# Patient Record
Sex: FEMALE | Race: WHITE | NOT HISPANIC OR LATINO | Employment: FULL TIME | URBAN - NONMETROPOLITAN AREA
[De-identification: names, ages, dates, MRNs, and addresses within clinical notes are randomized per-mention and may not be internally consistent; named-entity substitution may affect disease eponyms.]

---

## 2017-01-31 ENCOUNTER — TELEPHONE (OUTPATIENT)
Dept: FAMILY MEDICINE | Facility: OTHER | Age: 42
End: 2017-01-31

## 2017-01-31 DIAGNOSIS — E03.9 HYPOTHYROIDISM: Primary | ICD-10-CM

## 2017-01-31 NOTE — TELEPHONE ENCOUNTER
Last night started feeling ill, went to bed early but woke up and felt sudden rush of nausea and abdominal pain that almost caused her to pass out, this occurred 3 times during the night. Also has body aches, low grade temp, sore throat and a lot of sinus pressure. But more concerned about syncope symptoms. Do you want her to come in to be examined or think more flu like symptoms?

## 2017-02-01 RX ORDER — THYROID 30 MG/1
TABLET ORAL
Qty: 90 TABLET | Refills: 0 | Status: SHIPPED | OUTPATIENT
Start: 2017-02-01 | End: 2017-11-03

## 2017-02-22 ENCOUNTER — APPOINTMENT (OUTPATIENT)
Dept: GENERAL RADIOLOGY | Facility: OTHER | Age: 42
End: 2017-02-22
Attending: FAMILY MEDICINE
Payer: COMMERCIAL

## 2017-02-22 ENCOUNTER — OFFICE VISIT (OUTPATIENT)
Dept: FAMILY MEDICINE | Facility: OTHER | Age: 42
End: 2017-02-22
Attending: FAMILY MEDICINE
Payer: COMMERCIAL

## 2017-02-22 VITALS
BODY MASS INDEX: 24.16 KG/M2 | HEIGHT: 65 IN | TEMPERATURE: 98.5 F | WEIGHT: 145 LBS | OXYGEN SATURATION: 98 % | DIASTOLIC BLOOD PRESSURE: 70 MMHG | SYSTOLIC BLOOD PRESSURE: 104 MMHG | HEART RATE: 74 BPM

## 2017-02-22 DIAGNOSIS — R07.89 CHEST WALL PAIN: Primary | ICD-10-CM

## 2017-02-22 DIAGNOSIS — J18.9 PNEUMONIA OF RIGHT LOWER LOBE DUE TO INFECTIOUS ORGANISM: ICD-10-CM

## 2017-02-22 PROCEDURE — 99213 OFFICE O/P EST LOW 20 MIN: CPT | Performed by: FAMILY MEDICINE

## 2017-02-22 PROCEDURE — 71020 ZZHC CHEST TWO VIEWS, FRONT/LAT: CPT | Mod: TC | Performed by: RADIOLOGY

## 2017-02-22 RX ORDER — AZITHROMYCIN 250 MG/1
TABLET, FILM COATED ORAL
Qty: 6 TABLET | Refills: 0 | Status: SHIPPED | OUTPATIENT
Start: 2017-02-22 | End: 2017-03-29

## 2017-02-22 ASSESSMENT — PAIN SCALES - GENERAL: PAINLEVEL: MODERATE PAIN (4)

## 2017-02-22 NOTE — MR AVS SNAPSHOT
"              After Visit Summary   2017    Deanne Yee    MRN: [id number]           Patient Information     Date Of Birth          1975        Visit Information        Provider Department      2017 10:45 AM Astrid Romero MD [facility]        Today's Diagnoses     Chest wall pain    -  1    Pneumonia of right lower lobe due to infectious organism           Follow-ups after your visit        Who to contact     If you have questions or need follow up information about today's clinic visit or your schedule please [facility] directly at [phone].  Normal or non-critical lab and imaging results will be communicated to you by [employer], letter or phone within 4 business days after the clinic has received the results. If you do not hear from us within 7 days, please contact the clinic through [employer] or phone. If you have a critical or abnormal lab result, we will notify you by phone as soon as possible.  Submit refill requests through [employer] or call your pharmacy and they will forward the refill request to us. Please allow 3 business days for your refill to be completed.          Additional Information About Your Visit        MyChart Information     [employer] lets you send messages to your doctor, view your test results, renew your prescriptions, schedule appointments and more. To sign up, go to [email or web address] . Click on \"Log in\" on the left side of the screen, which will take you to the Welcome page. Then click on \"Sign up Now\" on the right side of the page.     You will be asked to enter the access code listed below, as well as some personal information. Please follow the directions to create your username and password.     Your access code is: [email or web address]  Expires: 2017  3:28 PM     Your access code will  in 90 days. If you need help or a new code, please call your [facility] or [phone].        Care EveryWhere ID     This is " "your Care EveryWhere ID. This could be used by other organizations to access your [place] medical records  [id number]        Your Vitals Were     Pulse Temperature Height Pulse Oximetry BMI (Body Mass Index)       74 98.5  F (36.9  C) (Tympanic) 5' 5\" (1.651 m) 98% 24.13 kg/m2        Blood Pressure from Last 3 Encounters:   02/22/17 104/70   08/09/16 116/70   06/15/16 122/72    Weight from Last 3 Encounters:   02/22/17 145 lb (65.8 kg)   08/09/16 159 lb (72.1 kg)   06/15/16 160 lb (72.6 kg)              We Performed the Following     XR CHEST 2 VW (Clinic Performed)          Today's Medication Changes          These changes are accurate as of: 2/22/17  3:28 PM.  If you have any questions, ask your nurse or doctor.               Start taking these medicines.        Dose/Directions    azithromycin 250 MG tablet   Commonly known as:  ZITHROMAX   Used for:  Pneumonia of right lower lobe due to infectious organism   Started by:  Astrid Romero MD        Two tablets first day, then one tablet daily for four days.   Quantity:  6 tablet   Refills:  0         Stop taking these medicines if you haven't already. Please contact your care team if you have questions.     amitriptyline 10 MG tablet   Commonly known as:  ELAVIL   Stopped by:  Astrid Romero MD           hydrOXYzine 25 MG capsule   Commonly known as:  VISTARIL   Stopped by:  Astrid Romero MD           nortriptyline 10 MG capsule   Commonly known as:  PAMELOR   Stopped by:  Astrid Romero MD           SUMAtriptan 25 MG tablet   Commonly known as:  IMITREX   Stopped by:  Astrid Romero MD                Where to get your medicines      These medications were sent to [id number] [place] - [address] [place] [facility] [place] [address], [place] [address]     Phone:  [phone]     azithromycin 250 MG tablet                Primary Care Provider Office Phone # Fax #    Arnav Lawrence [phone] " [phone]       [facility] [address] [place] [address]        Thank you!     Thank you for choosing [facility]  for your care. Our goal is always to provide you with excellent care. Hearing back from our patients is one way we can continue to improve our services. Please take a few minutes to complete the written survey that you may receive in the mail after your visit with us. Thank you!             Your Updated Medication List - Protect others around you: Learn how to safely use, store and throw away your medicines at www.disposemymeds.org.          This list is accurate as of: 2/22/17  3:28 PM.  Always use your most recent med list.                   Brand Name Dispense Instructions for use    ARMOUR THYROID 30 MG tablet   Generic drug:  thyroid     90 tablet    TAKE 1 TABLET BY MOUTH EVERY DAY       azithromycin 250 MG tablet    ZITHROMAX    6 tablet    Two tablets first day, then one tablet daily for four days.       CHILDRENS MULTIVITAMIN PO      Take 2 tablets by mouth daily       cholecalciferol 50755 UNITS capsule    VITAMIN D3    8 capsule    Take 1 capsule (50,000 Units) by mouth once a week       * sertraline 50 MG tablet    ZOLOFT    135 tablet    Take 1.5 tablets (75 mg) by mouth daily       * sertraline 25 MG tablet    ZOLOFT    90 tablet    TAKE 1 TABLET BY MOUTH EVERY DAY ALONG WITH THE 50MG TABLET       * Notice:  This list has 2 medication(s) that are the same as other medications prescribed for you. Read the directions carefully, and ask your doctor or other care provider to review them with you.

## 2017-02-22 NOTE — PROGRESS NOTES
"  SUBJECTIVE:                                                    Deanne Yee is a 41 year old female who presents to clinic today for the following health issues:        RESPIRATORY SYMPTOMS      Duration: Jan 30    Description  cough, fatigue/malaise and right rib pain    Severity: mild    Accompanying signs and symptoms: None    History (predisposing factors):  none    Precipitating or alleviating factors: None    Therapies tried and outcome:  none       Problem list and histories reviewed & adjusted, as indicated.  Additional history: as documented    Current Outpatient Prescriptions   Medication Sig Dispense Refill     azithromycin (ZITHROMAX) 250 MG tablet Two tablets first day, then one tablet daily for four days. 6 tablet 0     ARMOUR THYROID 30 MG tablet TAKE 1 TABLET BY MOUTH EVERY DAY 90 tablet 0     sertraline (ZOLOFT) 25 MG tablet TAKE 1 TABLET BY MOUTH EVERY DAY ALONG WITH THE 50MG TABLET 90 tablet 0     cholecalciferol (VITAMIN D3) 77698 UNITS capsule Take 1 capsule (50,000 Units) by mouth once a week 8 capsule 0     sertraline (ZOLOFT) 50 MG tablet Take 1.5 tablets (75 mg) by mouth daily 135 tablet 1     Pediatric Multivit-Minerals-C (CHILDRENS MULTIVITAMIN PO) Take 2 tablets by mouth daily       Problem list, Medication list, Allergies, and Medical/Social/Surgical histories reviewed in EPIC and updated as appropriate.    ROS:  Constitutional, HEENT, cardiovascular, pulmonary, gi and gu systems are negative, except as otherwise noted.    OBJECTIVE:                                                    /70 (Cuff Size: Adult Regular)  Pulse 74  Temp 98.5  F (36.9  C) (Tympanic)  Ht 5' 5\" (1.651 m)  Wt 145 lb (65.8 kg)  SpO2 98%  BMI 24.13 kg/m2  Body mass index is 24.13 kg/(m^2).  GENERAL APPEARANCE: healthy, alert, no distress and fatigued  HENT: ear canals and TM's normal and nose and mouth without ulcers or lesions  NECK: no adenopathy, no asymmetry, masses, or scars and thyroid normal to " palpation  RESP: rhonchi R lower posterior and E to A changes heard RML  CV: regular rates and rhythm, normal S1 S2, no S3 or S4 and no murmur, click or rub  PSYCH: mentation appears normal and affect normal/bright       ASSESSMENT/PLAN:                                                    1. Chest wall pain    - XR CHEST 2 VW (Clinic Performed)    2. Pneumonia of right lower lobe due to infectious organism  F/u if not improving, get plenty of rest  - azithromycin (ZITHROMAX) 250 MG tablet; Two tablets first day, then one tablet daily for four days.  Dispense: 6 tablet; Refill: 0    Patient was agreeable to this plan and had no further questions.  See Patient Instructions    Astrid Romero MD  [facility]

## 2017-02-22 NOTE — NURSING NOTE
"Chief Complaint   Patient presents with     URI       Initial /70 (Cuff Size: Adult Regular)  Pulse 74  Temp 98.5  F (36.9  C) (Tympanic)  Ht 5' 5\" (1.651 m)  Wt 145 lb (65.8 kg)  SpO2 98%  BMI 24.13 kg/m2 Estimated body mass index is 24.13 kg/(m^2) as calculated from the following:    Height as of this encounter: 5' 5\" (1.651 m).    Weight as of this encounter: 145 lb (65.8 kg).  Medication Reconciliation: complete   Bernadine Chow    "

## 2017-02-26 DIAGNOSIS — F43.21 ADJUSTMENT DISORDER WITH DEPRESSED MOOD: ICD-10-CM

## 2017-02-27 RX ORDER — SERTRALINE HYDROCHLORIDE 25 MG/1
TABLET, FILM COATED ORAL
Qty: 90 TABLET | Refills: 1 | Status: SHIPPED | OUTPATIENT
Start: 2017-02-27 | End: 2017-08-30

## 2017-03-29 ENCOUNTER — HOSPITAL ENCOUNTER (EMERGENCY)
Facility: HOSPITAL | Age: 42
Discharge: HOME | End: 2017-03-29
Attending: PHYSICIAN ASSISTANT | Admitting: PHYSICIAN ASSISTANT
Payer: COMMERCIAL

## 2017-03-29 VITALS
TEMPERATURE: 98.7 F | DIASTOLIC BLOOD PRESSURE: 80 MMHG | OXYGEN SATURATION: 100 % | RESPIRATION RATE: 18 BRPM | SYSTOLIC BLOOD PRESSURE: 133 MMHG | HEART RATE: 82 BPM

## 2017-03-29 DIAGNOSIS — R07.89 CHEST WALL PAIN: ICD-10-CM

## 2017-03-29 DIAGNOSIS — R06.00 DYSPNEA, UNSPECIFIED TYPE: ICD-10-CM

## 2017-03-29 DIAGNOSIS — S22.41XA CLOSED FRACTURE OF MULTIPLE RIBS OF RIGHT SIDE, INITIAL ENCOUNTER: ICD-10-CM

## 2017-03-29 DIAGNOSIS — R79.89 ELEVATED D-DIMER: ICD-10-CM

## 2017-03-29 DIAGNOSIS — R42 DIZZINESS: ICD-10-CM

## 2017-03-29 LAB
ALBUMIN SERPL-MCNC: 3.9 G/DL (ref 3.4–5)
ALBUMIN UR-MCNC: NEGATIVE MG/DL
ALP SERPL-CCNC: 68 U/L (ref 40–150)
ALT SERPL W P-5'-P-CCNC: 17 U/L (ref 0–50)
ANION GAP SERPL CALCULATED.3IONS-SCNC: 5 MMOL/L (ref 3–14)
APPEARANCE UR: CLEAR
AST SERPL W P-5'-P-CCNC: 16 U/L (ref 0–45)
BASOPHILS # BLD AUTO: 0 10E9/L (ref 0–0.2)
BASOPHILS NFR BLD AUTO: 0.6 %
BILIRUB SERPL-MCNC: 0.3 MG/DL (ref 0.2–1.3)
BILIRUB UR QL STRIP: NEGATIVE
BUN SERPL-MCNC: 7 MG/DL (ref 7–30)
CALCIUM SERPL-MCNC: 8.4 MG/DL (ref 8.5–10.1)
CHLORIDE SERPL-SCNC: 106 MMOL/L (ref 94–109)
CO2 SERPL-SCNC: 29 MMOL/L (ref 20–32)
COLOR UR AUTO: NORMAL
CREAT SERPL-MCNC: 0.66 MG/DL (ref 0.52–1.04)
CRP SERPL-MCNC: 6.4 MG/L (ref 0–8)
D DIMER PPP DDU-MCNC: 4052 NG/ML D-DU (ref 0–300)
DIFFERENTIAL METHOD BLD: NORMAL
EOSINOPHIL # BLD AUTO: 0 10E9/L (ref 0–0.7)
EOSINOPHIL NFR BLD AUTO: 0.2 %
ERYTHROCYTE [DISTWIDTH] IN BLOOD BY AUTOMATED COUNT: 12.1 % (ref 10–15)
GFR SERPL CREATININE-BSD FRML MDRD: ABNORMAL ML/MIN/1.7M2
GLUCOSE SERPL-MCNC: 104 MG/DL (ref 70–99)
GLUCOSE UR STRIP-MCNC: NEGATIVE MG/DL
HCG UR QL: NEGATIVE
HCT VFR BLD AUTO: 37.5 % (ref 35–47)
HGB BLD-MCNC: 13.1 G/DL (ref 11.7–15.7)
HGB UR QL STRIP: NEGATIVE
IMM GRANULOCYTES # BLD: 0 10E9/L (ref 0–0.4)
IMM GRANULOCYTES NFR BLD: 0.2 %
KETONES UR STRIP-MCNC: NEGATIVE MG/DL
LEUKOCYTE ESTERASE UR QL STRIP: NEGATIVE
LYMPHOCYTES # BLD AUTO: 1 10E9/L (ref 0.8–5.3)
LYMPHOCYTES NFR BLD AUTO: 19.6 %
MCH RBC QN AUTO: 30.5 PG (ref 26.5–33)
MCHC RBC AUTO-ENTMCNC: 34.9 G/DL (ref 31.5–36.5)
MCV RBC AUTO: 87 FL (ref 78–100)
MONOCYTES # BLD AUTO: 0.6 10E9/L (ref 0–1.3)
MONOCYTES NFR BLD AUTO: 11.3 %
NEUTROPHILS # BLD AUTO: 3.3 10E9/L (ref 1.6–8.3)
NEUTROPHILS NFR BLD AUTO: 68.1 %
NITRATE UR QL: NEGATIVE
NRBC # BLD AUTO: 0 10*3/UL
NRBC BLD AUTO-RTO: 0 /100
PH UR STRIP: 7 PH (ref 4.7–8)
PLATELET # BLD AUTO: 255 10E9/L (ref 150–450)
POTASSIUM SERPL-SCNC: 3.7 MMOL/L (ref 3.4–5.3)
PROT SERPL-MCNC: 7.6 G/DL (ref 6.8–8.8)
RBC # BLD AUTO: 4.29 10E12/L (ref 3.8–5.2)
SODIUM SERPL-SCNC: 140 MMOL/L (ref 133–144)
SP GR UR STRIP: 1 (ref 1–1.03)
TSH SERPL DL<=0.05 MIU/L-ACNC: 1.93 MU/L (ref 0.4–4)
URN SPEC COLLECT METH UR: NORMAL
UROBILINOGEN UR STRIP-MCNC: NORMAL MG/DL (ref 0–2)
WBC # BLD AUTO: 4.9 10E9/L (ref 4–11)

## 2017-03-29 PROCEDURE — 81003 URINALYSIS AUTO W/O SCOPE: CPT | Performed by: PHYSICIAN ASSISTANT

## 2017-03-29 PROCEDURE — 25900017 H RX MED GY IP 259 OP 259 PS 637: Performed by: PHYSICIAN ASSISTANT

## 2017-03-29 PROCEDURE — 85025 COMPLETE CBC W/AUTO DIFF WBC: CPT | Performed by: PHYSICIAN ASSISTANT

## 2017-03-29 PROCEDURE — 71275 CT ANGIOGRAPHY CHEST: CPT | Mod: TC

## 2017-03-29 PROCEDURE — 96374 THER/PROPH/DIAG INJ IV PUSH: CPT | Mod: 59

## 2017-03-29 PROCEDURE — 71020 ZZHC CHEST TWO VIEWS, FRONT/LAT: CPT | Mod: TC

## 2017-03-29 PROCEDURE — 25000128 H RX IP 250 OP 636: Performed by: PHYSICIAN ASSISTANT

## 2017-03-29 PROCEDURE — 86140 C-REACTIVE PROTEIN: CPT | Performed by: PHYSICIAN ASSISTANT

## 2017-03-29 PROCEDURE — 99285 EMERGENCY DEPT VISIT HI MDM: CPT | Performed by: PHYSICIAN ASSISTANT

## 2017-03-29 PROCEDURE — 84443 ASSAY THYROID STIM HORMONE: CPT | Performed by: PHYSICIAN ASSISTANT

## 2017-03-29 PROCEDURE — 85379 FIBRIN DEGRADATION QUANT: CPT | Performed by: PHYSICIAN ASSISTANT

## 2017-03-29 PROCEDURE — 36415 COLL VENOUS BLD VENIPUNCTURE: CPT | Performed by: PHYSICIAN ASSISTANT

## 2017-03-29 PROCEDURE — 99285 EMERGENCY DEPT VISIT HI MDM: CPT | Mod: 25

## 2017-03-29 PROCEDURE — 80053 COMPREHEN METABOLIC PANEL: CPT | Performed by: PHYSICIAN ASSISTANT

## 2017-03-29 PROCEDURE — 81025 URINE PREGNANCY TEST: CPT | Performed by: PHYSICIAN ASSISTANT

## 2017-03-29 RX ORDER — KETOROLAC TROMETHAMINE 30 MG/ML
30 INJECTION, SOLUTION INTRAMUSCULAR; INTRAVENOUS ONCE
Status: COMPLETED | OUTPATIENT
Start: 2017-03-29 | End: 2017-03-29

## 2017-03-29 RX ORDER — SODIUM CHLORIDE 9 MG/ML
1000 INJECTION, SOLUTION INTRAVENOUS CONTINUOUS
Status: DISCONTINUED | OUTPATIENT
Start: 2017-03-29 | End: 2017-03-29 | Stop reason: HOSPADM

## 2017-03-29 RX ORDER — IOPAMIDOL 612 MG/ML
100 INJECTION, SOLUTION INTRAVASCULAR ONCE
Status: COMPLETED | OUTPATIENT
Start: 2017-03-29 | End: 2017-03-29

## 2017-03-29 RX ORDER — MECLIZINE HYDROCHLORIDE 25 MG/1
25 TABLET ORAL ONCE
Status: COMPLETED | OUTPATIENT
Start: 2017-03-29 | End: 2017-03-29

## 2017-03-29 RX ADMIN — IOPAMIDOL 100 ML: 612 INJECTION, SOLUTION INTRAVASCULAR at 15:22

## 2017-03-29 RX ADMIN — MECLIZINE HYDROCHLORIDE 25 MG: 25 TABLET ORAL at 14:04

## 2017-03-29 RX ADMIN — KETOROLAC TROMETHAMINE 30 MG: 30 INJECTION, SOLUTION INTRAMUSCULAR; INTRAVENOUS at 15:56

## 2017-03-29 RX ADMIN — SODIUM CHLORIDE 1000 ML: 9 INJECTION, SOLUTION INTRAVENOUS at 13:56

## 2017-03-29 ASSESSMENT — ENCOUNTER SYMPTOMS
PALPITATIONS: 1
LIGHT-HEADEDNESS: 1
HEADACHES: 1
APPETITE CHANGE: 0
SINUS PRESSURE: 1
SORE THROAT: 0
NERVOUS/ANXIOUS: 1
NAUSEA: 0
CHILLS: 0
PHOTOPHOBIA: 0
SHORTNESS OF BREATH: 0
ABDOMINAL PAIN: 0
FEVER: 0
CHEST TIGHTNESS: 0
DIZZINESS: 1
VOMITING: 0
NUMBNESS: 1

## 2017-03-29 NOTE — ED NOTES
Patient reports she feels lightheaded and short of breath intermittently. Unable to obtain lab specimens from the IV start.

## 2017-03-29 NOTE — ED AVS SNAPSHOT
[facility]    [address] [address]    Phone:  [phone]                                       Deanne Yee   MRN: [id number]    Department:  [facility]   Date of Visit:  3/29/2017           Patient Information     Date Of Birth          1975        Your diagnoses for this visit were:     Chest wall pain     Closed fracture of multiple ribs of right side, initial encounter     Dyspnea, unspecified type     Dizziness     Elevated d-dimer        You were seen by Marcia Moore PA-C.      Follow-up Information     Follow up with Astrid Romero MD In 2 days.    Specialty:  Family Practice    Contact information:    [facility] [place]  [address]  [facility] [address] [phone]          Follow up with [facility].    Specialty:  EMERGENCY MEDICINE    Why:  If symptoms worsen    Contact information:    [address] [address] [phone]    Additional information:    From [place]: Take [address]. Turn left at [address]. Turn left at the first stoplight on [address]. At the first stop sign, take a right onto [address]. Take a left into the parking lot and continue through until you reach the North enterance of the building.       From [place]: Take [address]. Take the [address] ramp towards [place]. Turn left onto [address]. Take a slight right onto [address]. Turn left at the first stoplight on [address]. At the first stop sign, take a right onto [address]. Take a left into the parking lot and continue through until you reach the North enterance of the building.       From [place]: Take [address]. Take a right at [address]. At the first stop sign, take a right onto [address]. Take a left into the parking lot and continue through until you reach the North enterance of the building.         Discharge  Instructions       What to expect when you have contrast    During your exam, we will inject  contrast  into your vein or artery. (Contrast is a clear liquid with iodine in it. It shows up on X-rays.)    You may feel warm or hot. You may have a metal taste in your mouth and a slight upset stomach. You may also feel pressure near the kidneys and bladder. These effects will last about 1 to 3 minutes.    Please tell us if you have:    Sneezing     Itching    Hives     Swelling in the face    A hoarse voice    Breathing problems    Other new symptoms    Serious problems are rare.  They may include:    Irregular heartbeat     Seizures    Kidney failure              Tissue damage    Shock      Death    If you have any problems during the exam, we  will treat them right away.    When you get home    Call your hospital if you have any new symptoms in the next 2 days, like hives or swelling. (Phone numbers are at the bottom of this page.) Or call your family doctor.     If you have wheezing or trouble breathing, call 911.    Self-care  -Drink at least 4 extra glasses of water today.   This reduces the stress on your kidneys.  -Keep taking your regular medicines.    The contrast will pass out of your body in your  Urine(pee). This will happen in the next 24 hours. You  will not feel this. Your urine will not  change color.    If you have kidney problems or take metformin    Drink 4 to 8 large glasses of water for the next  2 days, if you are not on a fluid restriction.    ?If you take metformin (Glucophage or Glucovance) for diabetes, keep taking it.      ?Your kidney function tests are abnormal.  If you take Metformin, do not take it for 48 hours. Please go to your clinic for a blood test within 3 days after your exam before the restarting this medicine.     (Note to provider:please give patient prescription for lab tests.)    ?Special instructions: Drink at least 4 extra glasses of water today.   This reduces the stress on  your kidneys.    I have read and understand the above information.    Patient Sign Here:______________________________________Date:________Time:______    Staff Sign Here:________________________________________Date:_______Time:______      Radiology Departments:     ?[facility]: [phone] ?[facility]: [phone]     ?[place]: [phone] ?[facility]:[phone]      ?[place]: [phone]  ?[facility]: [phone]  ?[facility]:[phone]    ?[facility]:[phone]  ?[facility]:[phone]      I have scheduled a follow-up with Dr. Romero on Friday 3/31 at 10:45 am.    Please return HERE if your dizziness worsens or you have any other concerns or questions.     Future Appointments        Provider Department Dept Phone Center    3/31/2017 11:00 AM Astrid Romero MD [facility] [phone] [facility]         Review of your medicines      Our records show that you are taking the medicines listed below. If these are incorrect, please call your family doctor or clinic.        Dose / Directions Last dose taken    ARMOUR THYROID 30 MG tablet   Quantity:  90 tablet   Generic drug:  thyroid        TAKE 1 TABLET BY MOUTH EVERY DAY   Refills:  0        CHILDRENS MULTIVITAMIN PO   Dose:  2 tablet        Take 2 tablets by mouth daily   Refills:  0        cholecalciferol 86631 UNITS capsule   Commonly known as:  VITAMIN D3   Dose:  1 capsule   Quantity:  8 capsule        Take 1 capsule (50,000 Units) by mouth once a week   Refills:  0        dextromethorphan 15 MG/5ML syrup   Dose:  10 mL        Take 10 mLs by mouth 4 times daily as needed for cough   Refills:  0        * sertraline 50 MG tablet   Commonly known as:  ZOLOFT   Dose:  75 mg   Quantity:  135 tablet        Take 1.5 tablets (75 mg) by mouth daily   Refills:  1        * sertraline 25 MG tablet   Commonly known as:  ZOLOFT   Quantity:  90 tablet        TAKE 1 TABLET BY MOUTH EVERY DAY ALONG WITH THE 50MG TABLET   Refills:  1        *  "Notice:  This list has 2 medication(s) that are the same as other medications prescribed for you. Read the directions carefully, and ask your doctor or other care provider to review them with you.            Procedures and tests performed during your visit     CBC with platelets differential    CRP inflammation    CT Chest Angio w and w/o contrast    Comprehensive metabolic panel    D-Dimer ([facility] [place])    HCG qualitative urine    Peripheral IV catheter    TSH    UA reflex to Microscopic    XR Chest 2 Views      Orders Needing Specimen Collection     None      Pending Results     Date and Time Order Name Status Description    3/29/2017 1437 CT Chest Angio w and w/o contrast In process             Pending Culture Results     No orders found from 3/27/2017 to 3/30/2017.            Thank you for choosing [facility]       Thank you for choosing [facility] for your care. Our goal is always to provide you with excellent care. Hearing back from our patients is one way we can continue to improve our services. Please take a few minutes to complete the written survey that you may receive in the mail after you visit with us. Thank you!        [facility] Information     [employer] lets you send messages to your doctor, view your test results, renew your prescriptions, schedule appointments and more. To sign up, go to [email or web address] . Click on \"Log in\" on the left side of the screen, which will take you to the Welcome page. Then click on \"Sign up Now\" on the right side of the page.     You will be asked to enter the access code listed below, as well as some personal information. Please follow the directions to create your username and password.     Your access code is: [email or web address]  Expires: 2017  4:28 PM     Your access code will  in 90 days. If you need help or a new code, please call your [facility] clinic or [phone].        Care EveryWhere ID     This is your Care EveryWhere ID. This could be used by other " organizations to access your [place] medical records  [id number]        After Visit Summary       This is your record. Keep this with you and show to your community pharmacist(s) and doctor(s) at your next visit.

## 2017-03-29 NOTE — ED AVS SNAPSHOT
[facility]    [address] [address]    Phone:  [phone]                                       Deanne Yee   MRN: [id number]    Department:  [facility]   Date of Visit:  3/29/2017           After Visit Summary Signature Page     I have received my discharge instructions, and my questions have been answered. I have discussed any challenges I see with this plan with the nurse or doctor.    ..........................................................................................................................................  Patient/Patient Representative Signature      ..........................................................................................................................................  Patient Representative Print Name and Relationship to Patient    ..................................................               ................................................  Date                                            Time    ..........................................................................................................................................  Reviewed by Signature/Title    ...................................................              ..............................................  Date                                                            Time

## 2017-03-29 NOTE — ED NOTES
Reviewed discharge instructions with pt, offered no questions, did give copy of discharge instructions to pt, prior to leaving, along with work excuse note from provider.

## 2017-03-29 NOTE — DISCHARGE INSTRUCTIONS
What to expect when you have contrast    During your exam, we will inject  contrast  into your vein or artery. (Contrast is a clear liquid with iodine in it. It shows up on X-rays.)    You may feel warm or hot. You may have a metal taste in your mouth and a slight upset stomach. You may also feel pressure near the kidneys and bladder. These effects will last about 1 to 3 minutes.    Please tell us if you have:    Sneezing     Itching    Hives     Swelling in the face    A hoarse voice    Breathing problems    Other new symptoms    Serious problems are rare.  They may include:    Irregular heartbeat     Seizures    Kidney failure              Tissue damage    Shock      Death    If you have any problems during the exam, we  will treat them right away.    When you get home    Call your hospital if you have any new symptoms in the next 2 days, like hives or swelling. (Phone numbers are at the bottom of this page.) Or call your family doctor.     If you have wheezing or trouble breathing, call 911.    Self-care  -Drink at least 4 extra glasses of water today.   This reduces the stress on your kidneys.  -Keep taking your regular medicines.    The contrast will pass out of your body in your  Urine(pee). This will happen in the next 24 hours. You  will not feel this. Your urine will not  change color.    If you have kidney problems or take metformin    Drink 4 to 8 large glasses of water for the next  2 days, if you are not on a fluid restriction.    ?If you take metformin (Glucophage or Glucovance) for diabetes, keep taking it.      ?Your kidney function tests are abnormal.  If you take Metformin, do not take it for 48 hours. Please go to your clinic for a blood test within 3 days after your exam before the restarting this medicine.     (Note to provider:please give patient prescription for lab tests.)    ?Special instructions: Drink at least 4 extra glasses of water today.   This reduces the stress on your kidneys.    I  have read and understand the above information.    Patient Sign Here:______________________________________Date:________Time:______    Staff Sign Here:________________________________________Date:_______Time:______      Radiology Departments:     ?[facility]: [phone] ?[place]: [phone]     ?[place]: [phone] ?[facility]:[phone]      ?[place]: [phone]  ?[place]: [phone]  ?[place]:[phone]    ?[facility]:[phone]  ?[facility]:[phone]      I have scheduled a follow-up with Dr. Romero on Friday 3/31 at 10:45 am.    Please return HERE if your dizziness worsens or you have any other concerns or questions.

## 2017-03-29 NOTE — LETTER
[facility]  [address] [address]  Phone: [phone]    March 29, 2017        Deanne Yee  [address] [place] [id number]          To whom it may concern:    Deanne Yee was seen and treated in the [facility] ED on 3/29/17.  Please excuse her from work on 3/29, 3/30, and 3/31 due to a medical condition.         Please contact me for questions or concerns.      Sincerely,                Marcia Moore PA-C

## 2017-03-29 NOTE — PROGRESS NOTES
Preliminary results for STAT imaging were received at 1551 (time on fax or preliminary report).    Preliminary results for STAT imaging were given to Anjum at 1551 (time) and scanned into PACs at 1551 (time)

## 2017-03-29 NOTE — ED NOTES
Patient presents with report of several dizzy episodes with feeling her heart racing and fingers tingling while driving home from [place] starting at 1100 today. States recent history of pneumonia diagnosed on 2/22 which she completed a Z-pack for. States onset of sinus congestion over the weekend. Denies dizziness, heart racing, vision changes, or hand tingling at this time.  accompanies the patient.

## 2017-03-29 NOTE — ED PROVIDER NOTES
History     Chief Complaint   Patient presents with     Dizziness     States was diagnosed with pneumonia around 2/22. C/o sinus issues now and a cough that hasn't gone away since she was diagnosed. States dizziness started at 11 am today and she had to pull over in her car because she felt like she was going to faint. Denies fevers, nausea or vomiting, or diarrhea.      The history is provided by the patient.     Deanne Yee is a 41 year old female who presented to the ED ambulatory for evaluation of dizziness, weakness, dyspnea, and facial pressure.  Deanne tells me that she was driving home from a work meeting today, and around 1100 she developed some dizziness.  She needed to stop on the way home 3 times due to the dizziness.  No blood thinners.  No falls.  She was treated for pneumonia in February with a Zpac and has never improved.  She has felt short of breath for >1 month.  Also has right anterior chest pain for >1 month. She now complains of facial congestion.  She has a hx of migraines.  No fevers.  No abdominal pain.  No N/V/D.  Still has a nonproductive cough and feels short of breath. During her episodes of dizziness, she also reported palpitations and bilateral hand numbness.     Past Medical History:   Diagnosis Date     Cervical dysplasia 01/12/2012     MATHEW (generalized anxiety disorder)     started zoloft while pregnant 2016     GERD (gastroesophageal reflux disease)     while pregnant     Hx of migraine headaches 01/12/2012     Hypothyroidism      Preeclampsia     post partum     Reactive airway disease 01/12/2012     S/P LEEP (loop electrosurgical excision procedure) 01/12/2012     Sleep Pattern Disturbance 01/25/2013      Past Surgical History:   Procedure Laterality Date     DENTAL SURGERY      wisdom teeth removal     Loop electrosurgical excision procedure  2006      Social History     Social History     Marital status:      Spouse name: Joce     Number of children: 1     Years  of education: 14     Occupational History           full time     Social History Main Topics     Smoking status: Never Smoker     Smokeless tobacco: Never Used     Alcohol use 0.0 oz/week     0 Standard drinks or equivalent per week      Comment: 1 beer daily     Drug use: No     Sexual activity: Yes     Partners: Male     Birth control/ protection: Other      Comment: FABM     Other Topics Concern      Service No     Blood Transfusions Yes     Caffeine Concern Yes     3 cups coffee daily     Exercise Yes     weights,aerobic, jogging daily     Seat Belt Yes     Social History Narrative      Family History   Problem Relation Age of Onset     CANCER Paternal Grandmother      stomach     CEREBROVASCULAR DISEASE Paternal Grandfather      CVA; x3     Glaucoma Mother      Other - See Comments Father      hyperlipidemia     Hypertension Father      Other - See Comments Maternal Grandfather      myocardial infarction     Other - See Comments Maternal Grandmother      myocardial infarction; cause of death       I have reviewed the Medications, Allergies, Past Medical and Surgical History, and Social History in the Epic system.    Review of Systems   Constitutional: Negative for appetite change, chills and fever.   HENT: Positive for congestion and sinus pressure. Negative for sore throat.    Eyes: Negative for photophobia and visual disturbance.   Respiratory: Negative for chest tightness and shortness of breath.    Cardiovascular: Positive for palpitations. Negative for chest pain.   Gastrointestinal: Negative for abdominal pain, nausea and vomiting.   Genitourinary: Negative.    Musculoskeletal:        Right anterior chest wall pain.   Skin: Negative for pallor.   Neurological: Positive for dizziness, light-headedness, numbness and headaches.        Headache is considered normal for her.  NOT any different from her usual.    Psychiatric/Behavioral: The patient is nervous/anxious.        Physical Exam   BP:  118/77  Pulse: 82  Temp: 98.4  F (36.9  C)  Resp: 18  SpO2: 100 %  Physical Exam   Constitutional: She is oriented to person, place, and time. She appears well-developed and well-nourished.   HENT:   Head is atraumatic and normocephalic.  External auditory canals are clear and without edema or erythema.  TMs are pearly gray and unremarkable. Posterior pharynx has no swelling, erythema, or exudate.  Oral mucosa is pink and moist, without petechia, lesions, or ulcer.  Tongue is midline.  Palate rises symmetric.    Eyes:   Extraocular movements are intact and smooth throughout the H.  Conjunctiva are normal.  There is no horizontal or vertical nystagmus.  Pupils are equil in size and reactive to light. Lids are unremarkable.     Neck: Normal range of motion. Neck supple.   Cardiovascular: Normal rate and regular rhythm.    Pulmonary/Chest: Effort normal and breath sounds normal. She exhibits tenderness.   Abdominal: Soft. There is no tenderness.   Lymphadenopathy:     She has no cervical adenopathy.   Neurological: She is alert and oriented to person, place, and time.   Normal finger to nose.  Normal gait  Normal rapid finger.   Normal speech    Skin: Skin is warm and dry.   Psychiatric: She has a normal mood and affect.   Nursing note and vitals reviewed.      ED Course     ED Course     Procedures        Medications   sodium chloride (PF) 0.9% PF flush 3 mL (not administered)   sodium chloride (PF) 0.9% PF flush 3 mL (3 mLs Intracatheter Given 3/29/17 1355)   0.9% sodium chloride BOLUS (0 mLs Intravenous Stopped 3/29/17 1553)     Followed by   0.9% sodium chloride infusion (1,000 mLs Intravenous Not Given 3/29/17 1553)   meclizine (ANTIVERT) tablet 25 mg (25 mg Oral Given 3/29/17 1404)   iopamidol (ISOVUE-300) IV solution 61% 100 mL (100 mLs Intravenous Given 3/29/17 1522)   sodium chloride (PF) 0.9% PF flush 100 mL (100 mLs Intravenous Given 3/29/17 1522)   ketorolac (TORADOL) injection 30 mg (30 mg Intravenous  Given 3/29/17 1556)     Results for orders placed or performed during the hospital encounter of 03/29/17 (from the past 24 hour(s))   UA reflex to Microscopic   Result Value Ref Range    Color Urine Light Yellow     Appearance Urine Clear     Glucose Urine Negative NEG mg/dL    Bilirubin Urine Negative NEG    Ketones Urine Negative NEG mg/dL    Specific Gravity Urine 1.005 1.003 - 1.035    Blood Urine Negative NEG    pH Urine 7.0 4.7 - 8.0 pH    Protein Albumin Urine Negative NEG mg/dL    Urobilinogen mg/dL Normal 0.0 - 2.0 mg/dL    Nitrite Urine Negative NEG    Leukocyte Esterase Urine Negative NEG    Source Midstream Urine    HCG qualitative urine   Result Value Ref Range    HCG Qual Urine Negative NEG   CBC with platelets differential   Result Value Ref Range    WBC 4.9 4.0 - 11.0 10e9/L    RBC Count 4.29 3.8 - 5.2 10e12/L    Hemoglobin 13.1 11.7 - 15.7 g/dL    Hematocrit 37.5 35.0 - 47.0 %    MCV 87 78 - 100 fl    MCH 30.5 26.5 - 33.0 pg    MCHC 34.9 31.5 - 36.5 g/dL    RDW 12.1 10.0 - 15.0 %    Platelet Count 255 150 - 450 10e9/L    Diff Method Automated Method     % Neutrophils 68.1 %    % Lymphocytes 19.6 %    % Monocytes 11.3 %    % Eosinophils 0.2 %    % Basophils 0.6 %    % Immature Granulocytes 0.2 %    Nucleated RBCs 0 0 /100    Absolute Neutrophil 3.3 1.6 - 8.3 10e9/L    Absolute Lymphocytes 1.0 0.8 - 5.3 10e9/L    Absolute Monocytes 0.6 0.0 - 1.3 10e9/L    Absolute Eosinophils 0.0 0.0 - 0.7 10e9/L    Absolute Basophils 0.0 0.0 - 0.2 10e9/L    Abs Immature Granulocytes 0.0 0 - 0.4 10e9/L    Absolute Nucleated RBC 0.0    Comprehensive metabolic panel   Result Value Ref Range    Sodium 140 133 - 144 mmol/L    Potassium 3.7 3.4 - 5.3 mmol/L    Chloride 106 94 - 109 mmol/L    Carbon Dioxide 29 20 - 32 mmol/L    Anion Gap 5 3 - 14 mmol/L    Glucose 104 (H) 70 - 99 mg/dL    Urea Nitrogen 7 7 - 30 mg/dL    Creatinine 0.66 0.52 - 1.04 mg/dL    GFR Estimate >90  Non  GFR Calc   >60 mL/min/1.7m2     GFR Estimate If Black >90   GFR Calc   >60 mL/min/1.7m2    Calcium 8.4 (L) 8.5 - 10.1 mg/dL    Bilirubin Total 0.3 0.2 - 1.3 mg/dL    Albumin 3.9 3.4 - 5.0 g/dL    Protein Total 7.6 6.8 - 8.8 g/dL    Alkaline Phosphatase 68 40 - 150 U/L    ALT 17 0 - 50 U/L    AST 16 0 - 45 U/L   TSH   Result Value Ref Range    TSH 1.93 0.40 - 4.00 mU/L   D-Dimer (FV Range)   Result Value Ref Range    D-Dimer ng/mL 4052 (H) 0 - 300 ng/ml D-DU   CRP inflammation   Result Value Ref Range    CRP Inflammation 6.4 0.0 - 8.0 mg/L   XR Chest 2 Views    Narrative    CHEST TWO VIEWS    HISTORY:  Cough and dyspnea.    COMPARISON:  Today's study is compared to a prior examination which is  dated February 22, 2017.    FINDINGS:  The cardiac silhouette and pulmonary vasculature are within  normal limits.  Lungs are clear on both projections.  Calcified  granuloma is suggested along the anterior margin of the T11-T12 disk  space.  This was present previously and is unchanged.    IMPRESSION:  NO EVIDENCE OF ACUTE OR ACTIVE CARDIOPULMONARY DISEASE.  LUNGS APPEAR TO BE CLEAR ASIDE FROM TINY GRANULOMA PORTRAYED OVER THE  POSTERIOR ASPECTS OF THE LUNGS AND APPEARS TO BE LOCATED WITHIN THE  RIGHT LOWER LOBE.  Exam Date: Mar 29, 2017 02:29:13 PM  Author: JEF BRISENO  This report is final and signed          Critical Care time:  none               Labs Ordered and Resulted from Time of ED Arrival Up to the Time of Departure from the ED   COMPREHENSIVE METABOLIC PANEL - Abnormal; Notable for the following:        Result Value    Glucose 104 (*)     Calcium 8.4 (*)     All other components within normal limits   D-DIMER (FV RANGE) - Abnormal; Notable for the following:     D-Dimer ng/mL 4052 (*)     All other components within normal limits   URINE MACROSCOPIC WITH REFLEX TO MICRO   HCG QUALITATIVE URINE   CBC WITH PLATELETS DIFFERENTIAL   TSH   CRP INFLAMMATION   PERIPHERAL IV CATHETER       Assessments & Plan (with Medical Decision  Making)   Findings as above.  With her dyspnea and chest pain for more than one month and highly elevated d dimer, I elected to perform a CTA that was negative except for healing right 6th and 7th rib fractures. With symptoms for >1 month, there is no indication for urgent cardiac work-up.  Neuro exam was entirely negative and her mild headache is normal character and quality as her usual.  No falls.  No blood thinners.  I scheduled a follow-up with Dr. Romero on Friday at 1045.  Discussed the I could find no emergent cause of her symptoms today, but this certainly doesn't mean that nothing is wrong.  She is going to stay home from work tomorrow.  I stressed returning here for ANY changes or worsening symptoms whatsoever.  Ms. Yee voiced complete understanding and was happy and agreeable.     I have reviewed the nursing notes.    I have reviewed the findings, diagnosis, plan and need for follow up with the patient.    New Prescriptions    No medications on file       Final diagnoses:   Chest wall pain   Closed fracture of multiple ribs of right side, initial encounter   Dyspnea, unspecified type   Dizziness   Elevated d-dimer       3/29/2017   [facility]     Marcia Moore PA-C  03/29/17 1612       Marcia Moore PA-C  03/29/17 1612

## 2017-03-31 ENCOUNTER — OFFICE VISIT (OUTPATIENT)
Dept: FAMILY MEDICINE | Facility: OTHER | Age: 42
End: 2017-03-31
Attending: FAMILY MEDICINE
Payer: COMMERCIAL

## 2017-03-31 VITALS
DIASTOLIC BLOOD PRESSURE: 64 MMHG | TEMPERATURE: 97.4 F | HEART RATE: 107 BPM | WEIGHT: 142 LBS | OXYGEN SATURATION: 98 % | HEIGHT: 65 IN | SYSTOLIC BLOOD PRESSURE: 104 MMHG | BODY MASS INDEX: 23.66 KG/M2

## 2017-03-31 DIAGNOSIS — R10.11 ABDOMINAL PAIN, RIGHT UPPER QUADRANT: ICD-10-CM

## 2017-03-31 DIAGNOSIS — R79.89 ELEVATED D-DIMER: ICD-10-CM

## 2017-03-31 DIAGNOSIS — K29.00 ACUTE GASTRITIS WITHOUT HEMORRHAGE, UNSPECIFIED GASTRITIS TYPE: ICD-10-CM

## 2017-03-31 DIAGNOSIS — S22.41XD CLOSED FRACTURE OF MULTIPLE RIBS OF RIGHT SIDE WITH ROUTINE HEALING, SUBSEQUENT ENCOUNTER: Primary | ICD-10-CM

## 2017-03-31 LAB — D DIMER PPP DDU-MCNC: 4174 NG/ML D-DU (ref 0–300)

## 2017-03-31 PROCEDURE — 85379 FIBRIN DEGRADATION QUANT: CPT | Performed by: FAMILY MEDICINE

## 2017-03-31 PROCEDURE — 36415 COLL VENOUS BLD VENIPUNCTURE: CPT | Performed by: FAMILY MEDICINE

## 2017-03-31 PROCEDURE — 99214 OFFICE O/P EST MOD 30 MIN: CPT | Performed by: FAMILY MEDICINE

## 2017-03-31 RX ORDER — HYDROCODONE BITARTRATE AND ACETAMINOPHEN 5; 325 MG/1; MG/1
1 TABLET ORAL 2 TIMES DAILY PRN
Qty: 20 TABLET | Refills: 0 | Status: SHIPPED | OUTPATIENT
Start: 2017-03-31 | End: 2019-10-22

## 2017-03-31 ASSESSMENT — ANXIETY QUESTIONNAIRES

## 2017-03-31 ASSESSMENT — PAIN SCALES - GENERAL: PAINLEVEL: MODERATE PAIN (4)

## 2017-03-31 ASSESSMENT — PATIENT HEALTH QUESTIONNAIRE - PHQ9: 5. POOR APPETITE OR OVEREATING: SEVERAL DAYS

## 2017-03-31 NOTE — PROGRESS NOTES
SUBJECTIVE:                                                    Deanne Yee is a 41 year old female who presents to clinic today for the following health issues:      ED/UC Followup:    Facility:  [facility] [place]  Date of visit: 3/29/17  Reason for visit: dizziness, tingling in fingers, SOB  Current Status: still having SOB, and pain in right side ribs       Abdominal Pain      Duration: 2-3 mos    Description (location/character/radiation): RUQ, epigstric       Associated flank pain: radiates around right flank to the back    Intensity:  mild, moderate    Accompanying signs and symptoms:        Fever/Chills: no        Gas/Bloating: no        Nausea/vomitting: YES       Diarrhea: no        Dysuria or Hematuria: no     History (previous similar pain/trauma/previous testing): n/a    Precipitating or alleviating factors:       Pain worse with eating/BM/urination: not really but not much appetite       Pain relieved by BM: no     Therapies tried and outcome: None    LMP:  not applicable       Problem list and histories reviewed & adjusted, as indicated.  Additional history: as documented    Current Outpatient Prescriptions   Medication Sig Dispense Refill     HYDROcodone-acetaminophen (NORCO) 5-325 MG per tablet Take 1 tablet by mouth 2 times daily as needed for moderate to severe pain 20 tablet 0     ranitidine (ZANTAC) 300 MG tablet Take 1 tablet (300 mg) by mouth At Bedtime 30 tablet 1     sertraline (ZOLOFT) 25 MG tablet TAKE 1 TABLET BY MOUTH EVERY DAY ALONG WITH THE 50MG TABLET 90 tablet 1     ARMOUR THYROID 30 MG tablet TAKE 1 TABLET BY MOUTH EVERY DAY 90 tablet 0     cholecalciferol (VITAMIN D3) 48282 UNITS capsule Take 1 capsule (50,000 Units) by mouth once a week 8 capsule 0     sertraline (ZOLOFT) 50 MG tablet Take 1.5 tablets (75 mg) by mouth daily 135 tablet 1     Pediatric Multivit-Minerals-C (CHILDRENS MULTIVITAMIN PO) Take 2 tablets by mouth daily       Labs reviewed in EPIC    ROS:  Constitutional, HEENT,  "cardiovascular, pulmonary, gi and gu systems are negative, except as otherwise noted.    OBJECTIVE:                                                    /64  Pulse 107  Temp 97.4  F (36.3  C) (Tympanic)  Ht 5' 5\" (1.651 m)  Wt 142 lb (64.4 kg)  SpO2 98%  BMI 23.63 kg/m2  Body mass index is 23.63 kg/(m^2).  GENERAL APPEARANCE: alert, mild distress and fatigued  HENT: ear canals and TM's normal and nose and mouth without ulcers or lesions  NECK: no adenopathy, no asymmetry, masses, or scars and thyroid normal to palpation  RESP: lungs clear to auscultation - no rales, rhonchi or wheezes  CV: regular rates and rhythm, normal S1 S2, no S3 or S4 and no murmur, click or rub  ABDOMEN: bowel sounds normal, no hepatosplenomegaly or masses and epigastric discomfort and positive haines's sign  PSYCH: mentation appears normal and affect normal/bright       ASSESSMENT/PLAN:                                                    1. Closed fracture of multiple ribs of right side with routine healing, subsequent encounter    - HYDROcodone-acetaminophen (NORCO) 5-325 MG per tablet; Take 1 tablet by mouth 2 times daily as needed for moderate to severe pain  Dispense: 20 tablet; Refill: 0    2. Elevated d-dimer  Will repeat, ct negative  - D-Dimer (FV Range)    3. Abdominal pain, right upper quadrant  Suspect gallbladder  - US Abdomen Limited; Future  - US Abdomen Limited    4. Acute gastritis without hemorrhage, unspecified gastritis type    - ranitidine (ZANTAC) 300 MG tablet; Take 1 tablet (300 mg) by mouth At Bedtime  Dispense: 30 tablet; Refill: 1    Patient was agreeable to this plan and had no further questions.  See Patient Instructions    Astrid Romero MD  [facility] [place]      "

## 2017-03-31 NOTE — NURSING NOTE
"Chief Complaint   Patient presents with     ER F/U       Initial /64  Pulse 107  Temp 97.4  F (36.3  C) (Tympanic)  Ht 5' 5\" (1.651 m)  Wt 142 lb (64.4 kg)  SpO2 98%  BMI 23.63 kg/m2 Estimated body mass index is 23.63 kg/(m^2) as calculated from the following:    Height as of this encounter: 5' 5\" (1.651 m).    Weight as of this encounter: 142 lb (64.4 kg).  Medication Reconciliation: complete   Bernadine Chow    "

## 2017-03-31 NOTE — MR AVS SNAPSHOT
"              After Visit Summary   3/31/2017    Deanne Yee    MRN: [id number]           Patient Information     Date Of Birth          1975        Visit Information        Provider Department      3/31/2017 11:00 AM Astrid Romero MD [facility]        Today's Diagnoses     Closed fracture of multiple ribs of right side with routine healing, subsequent encounter    -  1    Elevated d-dimer        Abdominal pain, right upper quadrant        Acute gastritis without hemorrhage, unspecified gastritis type           Follow-ups after your visit        Who to contact     If you have questions or need follow up information about today's clinic visit or your schedule please contact [facility] directly at [phone].  Normal or non-critical lab and imaging results will be communicated to you by MyChart, letter or phone within 4 business days after the clinic has received the results. If you do not hear from us within 7 days, please contact the clinic through MyChart or phone. If you have a critical or abnormal lab result, we will notify you by phone as soon as possible.  Submit refill requests through [facility] or call your pharmacy and they will forward the refill request to us. Please allow 3 business days for your refill to be completed.          Additional Information About Your Visit        MyChart Information     [facility] lets you send messages to your doctor, view your test results, renew your prescriptions, schedule appointments and more. To sign up, go to [email or web address] . Click on \"Log in\" on the left side of the screen, which will take you to the Welcome page. Then click on \"Sign up Now\" on the right side of the page.     You will be asked to enter the access code listed below, as well as some personal information. Please follow the directions to create your username and password.     Your access code is: [email or web address]  Expires: 5/23/2017  4:28 PM     Your access " "code will  in 90 days. If you need help or a new code, please call your [facility] clinic or [phone].        Care EveryWhere ID     This is your Care EveryWhere ID. This could be used by other organizations to access your [facility] medical records  [id number]        Your Vitals Were     Pulse Temperature Height Pulse Oximetry BMI (Body Mass Index)       107 97.4  F (36.3  C) (Tympanic) 5' 5\" (1.651 m) 98% 23.63 kg/m2        Blood Pressure from Last 3 Encounters:   17 104/64   17 133/80   17 104/70    Weight from Last 3 Encounters:   17 142 lb (64.4 kg)   17 145 lb (65.8 kg)   16 159 lb (72.1 kg)              We Performed the Following     D-Dimer (FV Range)     US Abdomen Limited          Today's Medication Changes          These changes are accurate as of: 3/31/17 11:16 AM.  If you have any questions, ask your nurse or doctor.               Start taking these medicines.        Dose/Directions    HYDROcodone-acetaminophen 5-325 MG per tablet   Commonly known as:  NORCO   Used for:  Closed fracture of multiple ribs of right side with routine healing, subsequent encounter   Started by:  Astrid Romero MD        Dose:  1 tablet   Take 1 tablet by mouth 2 times daily as needed for moderate to severe pain   Quantity:  20 tablet   Refills:  0       ranitidine 300 MG tablet   Commonly known as:  ZANTAC   Used for:  Acute gastritis without hemorrhage, unspecified gastritis type   Started by:  Astrid Romero MD        Dose:  300 mg   Take 1 tablet (300 mg) by mouth At Bedtime   Quantity:  30 tablet   Refills:  1            Where to get your medicines      These medications [facility] to [facility] - [place], [place] - [address] [facility] [address] [place] [address]     Phone:  [phone]     ranitidine 300 MG tablet         Some of these will need a paper prescription and others can be bought over the counter.  Ask your nurse " if you have questions.     Bring a paper prescription for each of these medications     HYDROcodone-acetaminophen 5-325 MG per tablet                Primary Care Provider Office Phone # Fax #    Astrid Romero [phone]       [facility] [place] [address]  [place] [address]        Thank you!     Thank you for choosing [facility] [place]  for your care. Our goal is always to provide you with excellent care. Hearing back from our patients is one way we can continue to improve our services. Please take a few minutes to complete the written survey that you may receive in the mail after your visit with us. Thank you!             Your Updated Medication List - Protect others around you: Learn how to safely use, store and throw away your medicines at www.disposemymeds.org.          This list is accurate as of: 3/31/17 11:16 AM.  Always use your most recent med list.                   Brand Name Dispense Instructions for use    ARMOUR THYROID 30 MG tablet   Generic drug:  thyroid     90 tablet    TAKE 1 TABLET BY MOUTH EVERY DAY       CHILDRENS MULTIVITAMIN PO      Take 2 tablets by mouth daily       cholecalciferol 58879 UNITS capsule    VITAMIN D3    8 capsule    Take 1 capsule (50,000 Units) by mouth once a week       HYDROcodone-acetaminophen 5-325 MG per tablet    NORCO    20 tablet    Take 1 tablet by mouth 2 times daily as needed for moderate to severe pain       ranitidine 300 MG tablet    ZANTAC    30 tablet    Take 1 tablet (300 mg) by mouth At Bedtime       * sertraline 50 MG tablet    ZOLOFT    135 tablet    Take 1.5 tablets (75 mg) by mouth daily       * sertraline 25 MG tablet    ZOLOFT    90 tablet    TAKE 1 TABLET BY MOUTH EVERY DAY ALONG WITH THE 50MG TABLET       * Notice:  This list has 2 medication(s) that are the same as other medications prescribed for you. Read the directions carefully, and ask your doctor or other care provider to review them with you.

## 2017-04-01 ASSESSMENT — ANXIETY QUESTIONNAIRES: GAD7 TOTAL SCORE: 1

## 2017-04-01 ASSESSMENT — PATIENT HEALTH QUESTIONNAIRE - PHQ9: SUM OF ALL RESPONSES TO PHQ QUESTIONS 1-9: 3

## 2017-04-04 ENCOUNTER — HOSPITAL ENCOUNTER (OUTPATIENT)
Dept: ULTRASOUND IMAGING | Facility: HOSPITAL | Age: 42
Discharge: HOME | End: 2017-04-04
Attending: FAMILY MEDICINE | Admitting: FAMILY MEDICINE
Payer: COMMERCIAL

## 2017-04-04 PROCEDURE — 76705 ECHO EXAM OF ABDOMEN: CPT | Mod: TC

## 2017-04-06 ENCOUNTER — OFFICE VISIT (OUTPATIENT)
Dept: FAMILY MEDICINE | Facility: OTHER | Age: 42
End: 2017-04-06
Attending: FAMILY MEDICINE
Payer: COMMERCIAL

## 2017-04-06 VITALS
OXYGEN SATURATION: 98 % | BODY MASS INDEX: 23.63 KG/M2 | SYSTOLIC BLOOD PRESSURE: 122 MMHG | DIASTOLIC BLOOD PRESSURE: 70 MMHG | WEIGHT: 142 LBS | HEART RATE: 84 BPM | TEMPERATURE: 98.1 F | RESPIRATION RATE: 17 BRPM

## 2017-04-06 DIAGNOSIS — R10.11 ABDOMINAL PAIN, RIGHT UPPER QUADRANT: ICD-10-CM

## 2017-04-06 DIAGNOSIS — R53.83 FATIGUE, UNSPECIFIED TYPE: Primary | ICD-10-CM

## 2017-04-06 DIAGNOSIS — R79.89 ELEVATED D-DIMER: ICD-10-CM

## 2017-04-06 DIAGNOSIS — R07.9 ACUTE CHEST PAIN: ICD-10-CM

## 2017-04-06 LAB
BASOPHILS # BLD AUTO: 0 10E9/L (ref 0–0.2)
BASOPHILS NFR BLD AUTO: 0.4 %
CRP SERPL-MCNC: <2.9 MG/L (ref 0–8)
D DIMER PPP DDU-MCNC: >5250 NG/ML D-DU (ref 0–300)
DIFFERENTIAL METHOD BLD: NORMAL
EOSINOPHIL # BLD AUTO: 0 10E9/L (ref 0–0.7)
EOSINOPHIL NFR BLD AUTO: 0 %
ERYTHROCYTE [DISTWIDTH] IN BLOOD BY AUTOMATED COUNT: 11.9 % (ref 10–15)
ERYTHROCYTE [SEDIMENTATION RATE] IN BLOOD BY WESTERGREN METHOD: 17 MM/H (ref 0–20)
HCT VFR BLD AUTO: 40.3 % (ref 35–47)
HGB BLD-MCNC: 14.1 G/DL (ref 11.7–15.7)
IMM GRANULOCYTES # BLD: 0 10E9/L (ref 0–0.4)
IMM GRANULOCYTES NFR BLD: 0.4 %
LDH SERPL L TO P-CCNC: 163 U/L (ref 81–234)
LYMPHOCYTES # BLD AUTO: 2.1 10E9/L (ref 0.8–5.3)
LYMPHOCYTES NFR BLD AUTO: 24.3 %
MCH RBC QN AUTO: 30 PG (ref 26.5–33)
MCHC RBC AUTO-ENTMCNC: 35 G/DL (ref 31.5–36.5)
MCV RBC AUTO: 86 FL (ref 78–100)
MONOCYTES # BLD AUTO: 0.5 10E9/L (ref 0–1.3)
MONOCYTES NFR BLD AUTO: 6 %
NEUTROPHILS # BLD AUTO: 5.9 10E9/L (ref 1.6–8.3)
NEUTROPHILS NFR BLD AUTO: 68.9 %
NRBC # BLD AUTO: 0 10*3/UL
NRBC BLD AUTO-RTO: 0 /100
PLATELET # BLD AUTO: 325 10E9/L (ref 150–450)
RBC # BLD AUTO: 4.7 10E12/L (ref 3.8–5.2)
RETICS # AUTO: 68.2 10E9/L (ref 25–95)
RETICS/RBC NFR AUTO: 1.5 % (ref 0.5–2)
WBC # BLD AUTO: 8.5 10E9/L (ref 4–11)

## 2017-04-06 PROCEDURE — 84165 PROTEIN E-PHORESIS SERUM: CPT | Mod: 90 | Performed by: FAMILY MEDICINE

## 2017-04-06 PROCEDURE — 85652 RBC SED RATE AUTOMATED: CPT | Performed by: FAMILY MEDICINE

## 2017-04-06 PROCEDURE — 85025 COMPLETE CBC W/AUTO DIFF WBC: CPT | Performed by: FAMILY MEDICINE

## 2017-04-06 PROCEDURE — 36415 COLL VENOUS BLD VENIPUNCTURE: CPT | Performed by: FAMILY MEDICINE

## 2017-04-06 PROCEDURE — 86140 C-REACTIVE PROTEIN: CPT | Performed by: FAMILY MEDICINE

## 2017-04-06 PROCEDURE — 99214 OFFICE O/P EST MOD 30 MIN: CPT | Mod: 25 | Performed by: FAMILY MEDICINE

## 2017-04-06 PROCEDURE — 93000 ELECTROCARDIOGRAM COMPLETE: CPT | Performed by: INTERNAL MEDICINE

## 2017-04-06 PROCEDURE — 83615 LACTATE (LD) (LDH) ENZYME: CPT | Performed by: FAMILY MEDICINE

## 2017-04-06 PROCEDURE — 85045 AUTOMATED RETICULOCYTE COUNT: CPT | Performed by: FAMILY MEDICINE

## 2017-04-06 PROCEDURE — 85379 FIBRIN DEGRADATION QUANT: CPT | Performed by: FAMILY MEDICINE

## 2017-04-06 PROCEDURE — 99000 SPECIMEN HANDLING OFFICE-LAB: CPT | Performed by: FAMILY MEDICINE

## 2017-04-06 PROCEDURE — 40000847 ZZHCL STATISTIC MORPHOLOGY W/INTERP HISTOLOGY TC 85060: Performed by: FAMILY MEDICINE

## 2017-04-06 ASSESSMENT — PAIN SCALES - GENERAL: PAINLEVEL: MILD PAIN (3)

## 2017-04-06 NOTE — MR AVS SNAPSHOT
"              After Visit Summary   2017    Deanne Yee    MRN: [id number]           Patient Information     Date Of Birth          1975        Visit Information        Provider Department      2017 3:45 PM Astrid Romero MD [facility]        Today's Diagnoses     Fatigue, unspecified type    -  1    Elevated d-dimer        Acute chest pain        Abdominal pain, right upper quadrant          Care Instructions    letha (brand samuel)  -- take 1 capsule at bedtime        Follow-ups after your visit        Who to contact     If you have questions or need follow up information about today's clinic visit or your schedule please contact [facility] directly at [phone].  Normal or non-critical lab and imaging results will be communicated to you by MyChart, letter or phone within 4 business days after the clinic has received the results. If you do not hear from us within 7 days, please contact the clinic through Popcutshart or phone. If you have a critical or abnormal lab result, we will notify you by phone as soon as possible.  Submit refill requests through ReferStar or call your pharmacy and they will forward the refill request to us. Please allow 3 business days for your refill to be completed.          Additional Information About Your Visit        MyChart Information     ReferStar lets you send messages to your doctor, view your test results, renew your prescriptions, schedule appointments and more. To sign up, go to [email or web address] . Click on \"Log in\" on the left side of the screen, which will take you to the Welcome page. Then click on \"Sign up Now\" on the right side of the page.     You will be asked to enter the access code listed below, as well as some personal information. Please follow the directions to create your username and password.     Your access code is: [email or web address]  Expires: 2017  4:28 PM     Your access code will  in 90 days. If " you need help or a new code, please call your [facility] clinic or [phone].        Care EveryWhere ID     This is your Care EveryWhere ID. This could be used by other organizations to access your [facility] medical records  [id number]        Your Vitals Were     Pulse Temperature Respirations Pulse Oximetry BMI (Body Mass Index)       84 98.1  F (36.7  C) 17 98% 23.63 kg/m2        Blood Pressure from Last 3 Encounters:   04/06/17 122/70   03/31/17 104/64   03/29/17 133/80    Weight from Last 3 Encounters:   04/06/17 142 lb (64.4 kg)   03/31/17 142 lb (64.4 kg)   02/22/17 145 lb (65.8 kg)              We Performed the Following     Bld morphology pathology review ( periph smear)     CBC with platelets differential     CRP, inflammation     D-Dimer (FV Range)     EKG 12-lead complete w/read - (Clinic Performed)     ESR: Erythrocyte sedimentation rate     LDH: Lactate Dehydrogenase, Total     NM HepatOBiliary Scan     Protein electrophoresis        Primary Care Provider Office Phone # Fax #    Astrid Romero [phone]       [facility] [place] [address] [place], [place] [address] [address]        Thank you!     Thank you for choosing [facility]  for your care. Our goal is always to provide you with excellent care. Hearing back from our patients is one way we can continue to improve our services. Please take a few minutes to complete the written survey that you may receive in the mail after your visit with us. Thank you!             Your Updated Medication List - Protect others around you: Learn how to safely use, store and throw away your medicines at www.disposemymeds.org.          This list is accurate as of: 4/6/17  4:02 PM.  Always use your most recent med list.                   Brand Name Dispense Instructions for use    ARMOUR THYROID 30 MG tablet   Generic drug:  thyroid     90 tablet    TAKE 1 TABLET BY MOUTH EVERY DAY       CHILDRENS MULTIVITAMIN PO      Take 2 tablets by mouth  daily       cholecalciferol 14261 UNITS capsule    VITAMIN D3    8 capsule    Take 1 capsule (50,000 Units) by mouth once a week       HYDROcodone-acetaminophen 5-325 MG per tablet    NORCO    20 tablet    Take 1 tablet by mouth 2 times daily as needed for moderate to severe pain       ranitidine 300 MG tablet    ZANTAC    30 tablet    Take 1 tablet (300 mg) by mouth At Bedtime       * sertraline 50 MG tablet    ZOLOFT    135 tablet    Take 1.5 tablets (75 mg) by mouth daily       * sertraline 25 MG tablet    ZOLOFT    90 tablet    TAKE 1 TABLET BY MOUTH EVERY DAY ALONG WITH THE 50MG TABLET       * Notice:  This list has 2 medication(s) that are the same as other medications prescribed for you. Read the directions carefully, and ask your doctor or other care provider to review them with you.

## 2017-04-06 NOTE — NURSING NOTE
"No chief complaint on file.      Initial /70  Pulse 84  Temp 98.1  F (36.7  C)  Resp 17  Wt 142 lb (64.4 kg)  SpO2 98%  BMI 23.63 kg/m2 Estimated body mass index is 23.63 kg/(m^2) as calculated from the following:    Height as of 3/31/17: 5' 5\" (1.651 m).    Weight as of this encounter: 142 lb (64.4 kg).  Medication Reconciliation: complete  "

## 2017-04-06 NOTE — PROGRESS NOTES
SUBJECTIVE:                                                    Deanne Yee is a 41 year old female who presents to clinic today for the following health issues:        Musculoskeletal problem/pain      Duration: follow up     Description  Location: chest wall    Intensity:  mild    Accompanying signs and symptoms: pain is the same     History  Previous similar problem: YES  Previous evaluation:  x-ray and CT    Precipitating or alleviating factors:  Trauma or overuse: no   Aggravating factors include: lifting and sweeping or anything physical     Therapies tried and outcome: Norco      Elevated d dimer test    Problem list and histories reviewed & adjusted, as indicated.  Additional history: as documented    Current Outpatient Prescriptions   Medication Sig Dispense Refill     HYDROcodone-acetaminophen (NORCO) 5-325 MG per tablet Take 1 tablet by mouth 2 times daily as needed for moderate to severe pain 20 tablet 0     ranitidine (ZANTAC) 300 MG tablet Take 1 tablet (300 mg) by mouth At Bedtime 30 tablet 1     sertraline (ZOLOFT) 25 MG tablet TAKE 1 TABLET BY MOUTH EVERY DAY ALONG WITH THE 50MG TABLET 90 tablet 1     ARMOUR THYROID 30 MG tablet TAKE 1 TABLET BY MOUTH EVERY DAY 90 tablet 0     cholecalciferol (VITAMIN D3) 84619 UNITS capsule Take 1 capsule (50,000 Units) by mouth once a week 8 capsule 0     sertraline (ZOLOFT) 50 MG tablet Take 1.5 tablets (75 mg) by mouth daily 135 tablet 1     Pediatric Multivit-Minerals-C (CHILDRENS MULTIVITAMIN PO) Take 2 tablets by mouth daily       Labs reviewed in EPIC    ROS:  Constitutional, HEENT, cardiovascular, pulmonary, gi and gu systems are negative, except as otherwise noted.    OBJECTIVE:                                                    /70  Pulse 84  Temp 98.1  F (36.7  C)  Resp 17  Wt 142 lb (64.4 kg)  SpO2 98%  BMI 23.63 kg/m2  Body mass index is 23.63 kg/(m^2).  GENERAL APPEARANCE: healthy, alert and no distress  RESP: lungs clear to  auscultation - no rales, rhonchi or wheezes  CV: regular rates and rhythm, normal S1 S2, no S3 or S4 and no murmur, click or rub  ABDOMEN: soft, nontender, without hepatosplenomegaly or masses and bowel sounds normal  PSYCH: mentation appears normal and affect normal/bright       ASSESSMENT/PLAN:                                                    1. Fatigue, unspecified type    - CRP, inflammation  - ESR: Erythrocyte sedimentation rate  - LDH: Lactate Dehydrogenase, Total  - Protein electrophoresis  - CBC with platelets differential  - Bld morphology pathology review ( periph smear)  - EKG 12-lead complete w/read - (Clinic Performed)  - Reticulocyte count    2. Elevated d-dimer    - CRP, inflammation  - ESR: Erythrocyte sedimentation rate  - LDH: Lactate Dehydrogenase, Total  - Protein electrophoresis  - CBC with platelets differential  - Bld morphology pathology review ( periph smear)  - EKG 12-lead complete w/read - (Clinic Performed)  - D-Dimer (FV Range)  - Reticulocyte count    3. Acute chest pain    - CRP, inflammation  - ESR: Erythrocyte sedimentation rate  - LDH: Lactate Dehydrogenase, Total  - Protein electrophoresis  - CBC with platelets differential  - Bld morphology pathology review ( periph smear)  - EKG 12-lead complete w/read - (Clinic Performed)  - Reticulocyte count    4. Abdominal pain, right upper quadrant  Still having pain up in right shoulder  - NM HepatOBiliary Scan; Future  - NM HepatOBiliary Scan    Patient was agreeable to this plan and had no further questions.  See Patient Instructions    Astrid Romero MD  [facility]

## 2017-04-07 ENCOUNTER — HOSPITAL ENCOUNTER (OUTPATIENT)
Dept: MRI IMAGING | Facility: HOSPITAL | Age: 42
End: 2017-04-07
Attending: FAMILY MEDICINE
Payer: COMMERCIAL

## 2017-04-07 ENCOUNTER — OFFICE VISIT (OUTPATIENT)
Dept: FAMILY MEDICINE | Facility: OTHER | Age: 42
End: 2017-04-07
Attending: FAMILY MEDICINE
Payer: COMMERCIAL

## 2017-04-07 ENCOUNTER — HOSPITAL ENCOUNTER (OUTPATIENT)
Dept: ULTRASOUND IMAGING | Facility: HOSPITAL | Age: 42
Discharge: HOME | End: 2017-04-07
Attending: FAMILY MEDICINE | Admitting: FAMILY MEDICINE
Payer: COMMERCIAL

## 2017-04-07 VITALS
HEART RATE: 68 BPM | DIASTOLIC BLOOD PRESSURE: 70 MMHG | TEMPERATURE: 97.7 F | WEIGHT: 142 LBS | SYSTOLIC BLOOD PRESSURE: 110 MMHG | HEIGHT: 65 IN | BODY MASS INDEX: 23.66 KG/M2

## 2017-04-07 DIAGNOSIS — Z82.41 FHX: SUDDEN CARDIAC DEATH (SCD): ICD-10-CM

## 2017-04-07 DIAGNOSIS — R06.02 SOB (SHORTNESS OF BREATH): ICD-10-CM

## 2017-04-07 DIAGNOSIS — M79.651 PAIN OF RIGHT THIGH: ICD-10-CM

## 2017-04-07 DIAGNOSIS — M79.661 RIGHT CALF PAIN: ICD-10-CM

## 2017-04-07 DIAGNOSIS — R20.2 PARESTHESIA: ICD-10-CM

## 2017-04-07 DIAGNOSIS — S22.41XD CLOSED FRACTURE OF MULTIPLE RIBS OF RIGHT SIDE WITH ROUTINE HEALING, SUBSEQUENT ENCOUNTER: ICD-10-CM

## 2017-04-07 DIAGNOSIS — R79.89 ELEVATED D-DIMER: Primary | ICD-10-CM

## 2017-04-07 DIAGNOSIS — G43.811 OTHER MIGRAINE WITH STATUS MIGRAINOSUS, INTRACTABLE: ICD-10-CM

## 2017-04-07 DIAGNOSIS — J01.90 ACUTE SINUSITIS WITH SYMPTOMS > 10 DAYS: ICD-10-CM

## 2017-04-07 LAB
APTT PPP: 28 SEC (ref 24–37)
COPATH REPORT: NORMAL
FIBRINOGEN PPP-MCNC: 483 MG/DL (ref 200–420)
HCG UR QL: NEGATIVE
INR PPP: 1.09 (ref 0.8–1.2)
TROPONIN I SERPL-MCNC: NORMAL UG/L (ref 0–0.04)
VIT B12 SERPL-MCNC: 897 PG/ML (ref 193–986)

## 2017-04-07 PROCEDURE — 85730 THROMBOPLASTIN TIME PARTIAL: CPT | Performed by: FAMILY MEDICINE

## 2017-04-07 PROCEDURE — 76700 US EXAM ABDOM COMPLETE: CPT | Mod: TC

## 2017-04-07 PROCEDURE — 81025 URINE PREGNANCY TEST: CPT | Performed by: FAMILY MEDICINE

## 2017-04-07 PROCEDURE — 85384 FIBRINOGEN ACTIVITY: CPT | Performed by: FAMILY MEDICINE

## 2017-04-07 PROCEDURE — 85610 PROTHROMBIN TIME: CPT | Performed by: FAMILY MEDICINE

## 2017-04-07 PROCEDURE — 70553 MRI BRAIN STEM W/O & W/DYE: CPT | Mod: TC

## 2017-04-07 PROCEDURE — 36415 COLL VENOUS BLD VENIPUNCTURE: CPT | Performed by: FAMILY MEDICINE

## 2017-04-07 PROCEDURE — 82607 VITAMIN B-12: CPT | Mod: 90 | Performed by: FAMILY MEDICINE

## 2017-04-07 PROCEDURE — A9585 GADOBUTROL INJECTION: HCPCS | Mod: TC

## 2017-04-07 PROCEDURE — 93971 EXTREMITY STUDY: CPT | Mod: TC,RT

## 2017-04-07 PROCEDURE — 84484 ASSAY OF TROPONIN QUANT: CPT | Performed by: FAMILY MEDICINE

## 2017-04-07 PROCEDURE — 99215 OFFICE O/P EST HI 40 MIN: CPT | Performed by: FAMILY MEDICINE

## 2017-04-07 PROCEDURE — 99000 SPECIMEN HANDLING OFFICE-LAB: CPT | Performed by: FAMILY MEDICINE

## 2017-04-07 RX ORDER — LORAZEPAM 1 MG/1
TABLET ORAL
Qty: 2 TABLET | Refills: 0 | Status: SHIPPED | OUTPATIENT
Start: 2017-04-07 | End: 2017-04-09

## 2017-04-07 RX ORDER — GADOBUTROL 604.72 MG/ML
7.5 INJECTION INTRAVENOUS ONCE
Status: COMPLETED | OUTPATIENT
Start: 2017-04-07 | End: 2017-04-07

## 2017-04-07 RX ADMIN — GADOBUTROL 7.5 ML: 604.72 INJECTION INTRAVENOUS at 13:04

## 2017-04-07 ASSESSMENT — PAIN SCALES - GENERAL: PAINLEVEL: MODERATE PAIN (4)

## 2017-04-07 NOTE — MR AVS SNAPSHOT
After Visit Summary   4/7/2017    Deanne Yee    MRN: [id number]           Patient Information     Date Of Birth          1975        Visit Information        Provider Department      4/7/2017 4:15 PM Astrid Romero MD [facility]        Today's Diagnoses     Elevated d-dimer    -  1    Closed fracture of multiple ribs of right side with routine healing, subsequent encounter        Right calf pain        Pain of right thigh        Paresthesia        SOB (shortness of breath)        Other migraine with status migrainosus, intractable        FHx: sudden cardiac death (SCD)        Acute sinusitis with symptoms > 10 days           Follow-ups after your visit        Additional Services     ONC/HEME ADULT REFERRAL       Your provider has referred you to:   Dr Jaramillo  I discussed case with him, he would like to see her Monday      Please be aware that coverage of these services is subject to the terms and limitations of your health insurance plan.  Call member services at your health plan with any benefit or coverage questions.      Please bring the following with you to your appointment:    (1) Any X-Rays, CTs or MRIs which have been performed.  Contact the facility where they were done to arrange for  prior to your scheduled appointment.   (2) List of current medications  (3) This referral request   (4) Any documents/labs given to you for this referral                  Your next 10 appointments already scheduled     Apr 10, 2017 11:00 AM CDT   New Visit with Norberto Jaramillo MD   [facility] ([facility])    [address] [place] [address]   [phone]            Apr 19, 2017 12:00 PM CDT   Radiology with [facility] NUC MED   [facility] ([facility] )    [address] [place] [id number] [phone]            May 03, 2017  8:00 AM CDT   Radiology with [facility] Ul[facility] ([facility] )    [address] [place] [address]  "[facility] [address]   [phone]            May 03, 2017  9:00 AM CDT   Radiology with [facility] MED   [facility] ([facility] )    [address] [place] [address] [phone]              Who to contact     If you have questions or need follow up information about today's clinic visit or your schedule please contact [facility] directly at [phone].  Normal or non-critical lab and imaging results will be communicated to you by Alediahart, letter or phone within 4 business days after the clinic has received the results. If you do not hear from us within 7 days, please contact the clinic through Aggiost or phone. If you have a critical or abnormal lab result, we will notify you by phone as soon as possible.  Submit refill requests through SocialF5 or call your pharmacy and they will forward the refill request to us. Please allow 3 business days for your refill to be completed.          Additional Information About Your Visit        SocialF5 Information     SocialF5 lets you send messages to your doctor, view your test results, renew your prescriptions, schedule appointments and more. To sign up, go to [email or web address] . Click on \"Log in\" on the left side of the screen, which will take you to the Welcome page. Then click on \"Sign up Now\" on the right side of the page.     You will be asked to enter the access code listed below, as well as some personal information. Please follow the directions to create your username and password.     Your access code is: [email or web address]  Expires: 2017  4:28 PM     Your access code will  in 90 days. If you need help or a new code, please call your [facility] clinic or [phone].        Care EveryWhere ID     This is your Care EveryWhere ID. This could be used by other organizations to access your [facility] medical records  [id number]        Your Vitals Were     Pulse Temperature Height BMI (Body Mass Index)       " "   68 97.7  F (36.5  C) (Tympanic) 5' 5\" (1.651 m) 23.63 kg/m2         Blood Pressure from Last 3 Encounters:   04/07/17 110/70   04/06/17 122/70   03/31/17 104/64    Weight from Last 3 Encounters:   04/07/17 142 lb (64.4 kg)   04/06/17 142 lb (64.4 kg)   03/31/17 142 lb (64.4 kg)              We Performed the Following     Echocardiogram Complete     Fibrinogen activity     HCG qualitative urine     MR Brain w/o & w Contrast     NM Bone Scan Whole Body     ONC/HEME ADULT REFERRAL     Partial thromboplastin time     Reflex INR     Troponin I     US Abdomen Complete     US Lower Extremity Venous Duplex Right     Vitamin B12          Today's Medication Changes          These changes are accurate as of: 4/7/17  5:05 PM.  If you have any questions, ask your nurse or doctor.               Start taking these medicines.        Dose/Directions    amoxicillin-clavulanate 875-125 MG per tablet   Commonly known as:  AUGMENTIN   Used for:  Acute sinusitis with symptoms > 10 days        Dose:  1 tablet   Take 1 tablet by mouth 2 times daily   Quantity:  28 tablet   Refills:  0       LORazepam 1 MG tablet   Commonly known as:  ATIVAN   Used for:  Other migraine with status migrainosus, intractable, Elevated d-dimer        Take 1 tablet PO before MRI Repeat in 20 min if needed   Quantity:  2 tablet   Refills:  0         These medicines have changed or have updated prescriptions.        Dose/Directions    VITAMIN D3 PO   This may have changed:  Another medication with the same name was removed. Continue taking this medication, and follow the directions you see here.        Dose:  1 tablet   Take 1 tablet by mouth daily   Refills:  0            Where to get your medicines      These medications were sent to [id number] - [place] - [address] AT [facility] [address], [place] [address]     Phone:  [phone]     amoxicillin-clavulanate 875-125 MG per tablet         Some of these will need " a paper prescription and others can be bought over the counter.  Ask your nurse if you have questions.     Bring a paper prescription for each of these medications     LORazepam 1 MG tablet                Primary Care Provider Office Phone # Fax #    Astrid Romero [phone]       [facility] [place] [address]  [place] [address]        Thank you!     Thank you for choosing [facility] [place]  for your care. Our goal is always to provide you with excellent care. Hearing back from our patients is one way we can continue to improve our services. Please take a few minutes to complete the written survey that you may receive in the mail after your visit with us. Thank you!             Your Updated Medication List - Protect others around you: Learn how to safely use, store and throw away your medicines at www.disposemymeds.org.          This list is accurate as of: 4/7/17  5:05 PM.  Always use your most recent med list.                   Brand Name Dispense Instructions for use    amoxicillin-clavulanate 875-125 MG per tablet    AUGMENTIN    28 tablet    Take 1 tablet by mouth 2 times daily       ARMOUR THYROID 30 MG tablet   Generic drug:  thyroid     90 tablet    TAKE 1 TABLET BY MOUTH EVERY DAY       CHILDRENS MULTIVITAMIN PO      Take 2 tablets by mouth daily       HYDROcodone-acetaminophen 5-325 MG per tablet    NORCO    20 tablet    Take 1 tablet by mouth 2 times daily as needed for moderate to severe pain       LORazepam 1 MG tablet    ATIVAN    2 tablet    Take 1 tablet PO before MRI Repeat in 20 min if needed       ranitidine 300 MG tablet    ZANTAC    30 tablet    Take 1 tablet (300 mg) by mouth At Bedtime       * sertraline 50 MG tablet    ZOLOFT    135 tablet    Take 1.5 tablets (75 mg) by mouth daily       * sertraline 25 MG tablet    ZOLOFT    90 tablet    TAKE 1 TABLET BY MOUTH EVERY DAY ALONG WITH THE 50MG TABLET       VITAMIN D3 PO      Take 1 tablet by mouth daily        * Notice:  This list has 2 medication(s) that are the same as other medications prescribed for you. Read the directions carefully, and ask your doctor or other care provider to review them with you.

## 2017-04-07 NOTE — NURSING NOTE
"Chief Complaint   Patient presents with     Results       Initial /70  Pulse 68  Temp 97.7  F (36.5  C) (Tympanic)  Ht 5' 5\" (1.651 m)  Wt 142 lb (64.4 kg)  BMI 23.63 kg/m2 Estimated body mass index is 23.63 kg/(m^2) as calculated from the following:    Height as of this encounter: 5' 5\" (1.651 m).    Weight as of this encounter: 142 lb (64.4 kg).  Medication Reconciliation: complete   Bernadine Chow    "

## 2017-04-07 NOTE — PROGRESS NOTES
SUBJECTIVE:                                                    Deanne Yee is a 41 year old female who presents to clinic today for the following health issues:      Dyspnea      Duration: yesterday     Description (location/character/radiation): elevated d-dimer    Intensity:  moderate    Accompanying signs and symptoms: headache, right sided rib pain, SOB    History (similar episodes/previous evaluation): labs, xray, CT    Precipitating or alleviating factors: None    Therapies tried and outcome: None       Headaches      Duration: one week    Description  Location: bilateral in the frontal area, bilateral in the temporal area   Character: dull pain, squeezing pain  Frequency:  Daily, constatn  Duration:  One week    Intensity:  moderate    Accompanying signs and symptoms:    Precipitating or Alleviating factors:  Nausea/vomiting: occasionally  Dizziness: occasionally  Weakness or numbness: occasionally  Visual changes: auras  Fever: no   Sinus or URI symptoms YES    History  Head trauma: no   Family history of migraines: no   Previous tests for headaches: YES  Neurologist evaluations: no   Able to do daily activities when headache present: YES  Wake with headaches: YES  Daily pain medication use: YES  Any changes in: health    Precipitating or Alleviating factors (light/sound/sleep/caffeine): sleep somewhat    Therapies tried and outcome: various    Outcome - not effective  Frequent/daily pain medication use: YES       Problem list and histories reviewed & adjusted, as indicated.  Additional history: as documented    Current Outpatient Prescriptions   Medication Sig Dispense Refill     Cholecalciferol (VITAMIN D3 PO) Take 1 tablet by mouth daily       LORazepam (ATIVAN) 1 MG tablet Take 1 tablet PO before MRI  Repeat in 20 min if needed 2 tablet 0     amoxicillin-clavulanate (AUGMENTIN) 875-125 MG per tablet Take 1 tablet by mouth 2 times daily 28 tablet 0     HYDROcodone-acetaminophen (NORCO) 5-325 MG  "per tablet Take 1 tablet by mouth 2 times daily as needed for moderate to severe pain 20 tablet 0     ranitidine (ZANTAC) 300 MG tablet Take 1 tablet (300 mg) by mouth At Bedtime 30 tablet 1     sertraline (ZOLOFT) 25 MG tablet TAKE 1 TABLET BY MOUTH EVERY DAY ALONG WITH THE 50MG TABLET 90 tablet 1     ARMOUR THYROID 30 MG tablet TAKE 1 TABLET BY MOUTH EVERY DAY 90 tablet 0     sertraline (ZOLOFT) 50 MG tablet Take 1.5 tablets (75 mg) by mouth daily 135 tablet 1     Pediatric Multivit-Minerals-C (CHILDRENS MULTIVITAMIN PO) Take 2 tablets by mouth daily       Labs reviewed in EPIC    ROS:  Constitutional, HEENT, cardiovascular, pulmonary, gi and gu systems are negative, except as otherwise noted.    OBJECTIVE:                                                    /70  Pulse 68  Temp 97.7  F (36.5  C) (Tympanic)  Ht 5' 5\" (1.651 m)  Wt 142 lb (64.4 kg)  BMI 23.63 kg/m2  Body mass index is 23.63 kg/(m^2).  GENERAL APPEARANCE: alert, no distress and fatigued  HENT: ear canals and TM's normal and nose and mouth without ulcers or lesions  NECK: no adenopathy, no asymmetry, masses, or scars and thyroid normal to palpation  RESP: lungs clear to auscultation - no rales, rhonchi or wheezes  CV: regular rates and rhythm, normal S1 S2, no S3 or S4 and no murmur, click or rub  ABDOMEN: soft, RUQ tender, without hepatosplenomegaly or masses and bowel sounds normal  MS: tender right chest wall and right calf and thigh  SKIN: no suspicious lesions or rashes  NEURO: Normal strength and tone, mentation intact and speech normal  PSYCH: mentation appears normal and affect normal/bright       ASSESSMENT/PLAN:                                                    1. Elevated d-dimer  Challenging case, Deanne has not felt well for some time, d dimer keeps climbing and new symptoms arise  Discussed with Dr Jaramillo, she is otherwise stable (vitals), he will see her Monday and likely repeat CT c/a/p  Over 60 min spent with this patient " and her  over the course of the day, over 50% education and counseling  dsicused when to come if over weekend if not doing better  - Partial thromboplastin time  - Troponin I  - HCG qualitative urine  - Reflex INR  - US Abdomen Complete  - NM Bone Scan Whole Body  - Fibrinogen activity  - US Lower Extremity Venous Duplex Right  - MR Brain w/o & w Contrast; Future  - MR Brain w/o & w Contrast  - LORazepam (ATIVAN) 1 MG tablet; Take 1 tablet PO before MRI  Repeat in 20 min if needed  Dispense: 2 tablet; Refill: 0  - ONC/HEME ADULT REFERRAL    2. Closed fracture of multiple ribs of right side with routine healing, subsequent encounter    - Partial thromboplastin time  - Troponin I  - HCG qualitative urine  - Reflex INR  - US Abdomen Complete  - NM Bone Scan Whole Body    3. Right calf pain  negative  - US Lower Extremity Venous Duplex Right    4. Pain of right thigh  Negative for DVT  - US Lower Extremity Venous Duplex Right    5. Paresthesia  pending  - Vitamin B12    6. SOB (shortness of breath)  Lungs looked good but with fhx sudden death at age 51  Will monitor  - Echocardiogram Complete; Future  - Echocardiogram Complete    7. Other migraine with status migrainosus, intractable    - MR Brain w/o & w Contrast; Future  - MR Brain w/o & w Contrast  - LORazepam (ATIVAN) 1 MG tablet; Take 1 tablet PO before MRI  Repeat in 20 min if needed  Dispense: 2 tablet; Refill: 0    8. FHx: sudden cardiac death (SCD)    - Echocardiogram Complete; Future  - Echocardiogram Complete    9. Acute sinusitis with symptoms > 10 days  Found on MRI  - amoxicillin-clavulanate (AUGMENTIN) 875-125 MG per tablet; Take 1 tablet by mouth 2 times daily  Dispense: 28 tablet; Refill: 0    Patient was agreeable to this plan and had no further questions.  See Patient Instructions    Astrid Romero MD  [facility]

## 2017-04-09 ENCOUNTER — HOSPITAL ENCOUNTER (EMERGENCY)
Facility: HOSPITAL | Age: 42
Discharge: HOME | End: 2017-04-09
Payer: COMMERCIAL

## 2017-04-09 VITALS
DIASTOLIC BLOOD PRESSURE: 83 MMHG | SYSTOLIC BLOOD PRESSURE: 126 MMHG | TEMPERATURE: 98 F | OXYGEN SATURATION: 99 % | HEART RATE: 76 BPM | RESPIRATION RATE: 16 BRPM

## 2017-04-09 DIAGNOSIS — E87.6 HYPOKALEMIA: ICD-10-CM

## 2017-04-09 DIAGNOSIS — R07.89 ATYPICAL CHEST PAIN: ICD-10-CM

## 2017-04-09 LAB
ALBUMIN SERPL-MCNC: 4.2 G/DL (ref 3.4–5)
ALBUMIN UR-MCNC: NEGATIVE MG/DL
ALP SERPL-CCNC: 59 U/L (ref 40–150)
ALT SERPL W P-5'-P-CCNC: 20 U/L (ref 0–50)
AMPHETAMINES UR QL SCN: NORMAL
ANION GAP SERPL CALCULATED.3IONS-SCNC: 12 MMOL/L (ref 3–14)
APPEARANCE UR: CLEAR
APTT PPP: 26 SEC (ref 24–37)
AST SERPL W P-5'-P-CCNC: 21 U/L (ref 0–45)
BARBITURATES UR QL: NORMAL
BASOPHILS # BLD AUTO: 0.1 10E9/L (ref 0–0.2)
BASOPHILS NFR BLD AUTO: 0.7 %
BENZODIAZ UR QL: NORMAL
BILIRUB SERPL-MCNC: 0.4 MG/DL (ref 0.2–1.3)
BILIRUB UR QL STRIP: NEGATIVE
BUN SERPL-MCNC: 6 MG/DL (ref 7–30)
CALCIUM SERPL-MCNC: 8.5 MG/DL (ref 8.5–10.1)
CANNABINOIDS UR QL SCN: NORMAL
CHLORIDE SERPL-SCNC: 104 MMOL/L (ref 94–109)
CO2 SERPL-SCNC: 24 MMOL/L (ref 20–32)
COCAINE UR QL: NORMAL
COLOR UR AUTO: ABNORMAL
CREAT SERPL-MCNC: 0.73 MG/DL (ref 0.52–1.04)
CRP SERPL-MCNC: <2.9 MG/L (ref 0–8)
D DIMER PPP DDU-MCNC: 3936 NG/ML D-DU (ref 0–300)
DIFFERENTIAL METHOD BLD: NORMAL
EOSINOPHIL # BLD AUTO: 0 10E9/L (ref 0–0.7)
EOSINOPHIL NFR BLD AUTO: 0 %
ERYTHROCYTE [DISTWIDTH] IN BLOOD BY AUTOMATED COUNT: 11.9 % (ref 10–15)
FLUAV+FLUBV AG SPEC QL: NEGATIVE
FLUAV+FLUBV AG SPEC QL: NORMAL
GFR SERPL CREATININE-BSD FRML MDRD: 88 ML/MIN/1.7M2
GLUCOSE SERPL-MCNC: 107 MG/DL (ref 70–99)
GLUCOSE UR STRIP-MCNC: NEGATIVE MG/DL
HCT VFR BLD AUTO: 38 % (ref 35–47)
HGB BLD-MCNC: 13.6 G/DL (ref 11.7–15.7)
HGB UR QL STRIP: NEGATIVE
IMM GRANULOCYTES # BLD: 0 10E9/L (ref 0–0.4)
IMM GRANULOCYTES NFR BLD: 0.2 %
INR PPP: 1.06 (ref 0.8–1.2)
KETONES UR STRIP-MCNC: NEGATIVE MG/DL
LEUKOCYTE ESTERASE UR QL STRIP: NEGATIVE
LIPASE SERPL-CCNC: 353 U/L (ref 73–393)
LYMPHOCYTES # BLD AUTO: 2.7 10E9/L (ref 0.8–5.3)
LYMPHOCYTES NFR BLD AUTO: 31.6 %
MCH RBC QN AUTO: 30.8 PG (ref 26.5–33)
MCHC RBC AUTO-ENTMCNC: 35.8 G/DL (ref 31.5–36.5)
MCV RBC AUTO: 86 FL (ref 78–100)
METHADONE UR QL SCN: NORMAL
MONOCYTES # BLD AUTO: 0.6 10E9/L (ref 0–1.3)
MONOCYTES NFR BLD AUTO: 7 %
NEUTROPHILS # BLD AUTO: 5.1 10E9/L (ref 1.6–8.3)
NEUTROPHILS NFR BLD AUTO: 60.5 %
NITRATE UR QL: NEGATIVE
NRBC # BLD AUTO: 0 10*3/UL
NRBC BLD AUTO-RTO: 0 /100
OPIATES UR QL SCN: NORMAL
PCP UR QL SCN: NORMAL
PH UR STRIP: 7 PH (ref 4.7–8)
PLATELET # BLD AUTO: 357 10E9/L (ref 150–450)
POTASSIUM SERPL-SCNC: 2.9 MMOL/L (ref 3.4–5.3)
PROT SERPL-MCNC: 8.1 G/DL (ref 6.8–8.8)
RBC # BLD AUTO: 4.42 10E12/L (ref 3.8–5.2)
SODIUM SERPL-SCNC: 140 MMOL/L (ref 133–144)
SP GR UR STRIP: 1 (ref 1–1.03)
SPECIMEN SOURCE: NORMAL
TROPONIN I SERPL-MCNC: NORMAL UG/L (ref 0–0.04)
TSH SERPL DL<=0.05 MIU/L-ACNC: 3.59 MU/L (ref 0.4–4)
URN SPEC COLLECT METH UR: ABNORMAL
UROBILINOGEN UR STRIP-MCNC: NORMAL MG/DL (ref 0–2)
WBC # BLD AUTO: 8.5 10E9/L (ref 4–11)

## 2017-04-09 PROCEDURE — 74174 CTA ABD&PLVS W/CONTRAST: CPT | Mod: TC

## 2017-04-09 PROCEDURE — 85610 PROTHROMBIN TIME: CPT

## 2017-04-09 PROCEDURE — 25000128 H RX IP 250 OP 636

## 2017-04-09 PROCEDURE — 25000132 ZZH RX MED GY IP 250 OP 250 PS 637

## 2017-04-09 PROCEDURE — 36415 COLL VENOUS BLD VENIPUNCTURE: CPT

## 2017-04-09 PROCEDURE — 83690 ASSAY OF LIPASE: CPT

## 2017-04-09 PROCEDURE — 80307 DRUG TEST PRSMV CHEM ANLYZR: CPT

## 2017-04-09 PROCEDURE — 99284 EMERGENCY DEPT VISIT MOD MDM: CPT

## 2017-04-09 PROCEDURE — 85025 COMPLETE CBC W/AUTO DIFF WBC: CPT

## 2017-04-09 PROCEDURE — 81003 URINALYSIS AUTO W/O SCOPE: CPT | Mod: 59

## 2017-04-09 PROCEDURE — 96360 HYDRATION IV INFUSION INIT: CPT | Mod: 59

## 2017-04-09 PROCEDURE — 96361 HYDRATE IV INFUSION ADD-ON: CPT

## 2017-04-09 PROCEDURE — 93005 ELECTROCARDIOGRAM TRACING: CPT

## 2017-04-09 PROCEDURE — 86140 C-REACTIVE PROTEIN: CPT

## 2017-04-09 PROCEDURE — 85730 THROMBOPLASTIN TIME PARTIAL: CPT

## 2017-04-09 PROCEDURE — 25000131 ZZH RX MED GY IP 250 OP 636 PS 637

## 2017-04-09 PROCEDURE — 25000125 ZZHC RX 250

## 2017-04-09 PROCEDURE — 99285 EMERGENCY DEPT VISIT HI MDM: CPT | Mod: 25

## 2017-04-09 PROCEDURE — 71020 ZZHC CHEST TWO VIEWS, FRONT/LAT: CPT | Mod: TC

## 2017-04-09 PROCEDURE — 71275 CT ANGIOGRAPHY CHEST: CPT | Mod: TC

## 2017-04-09 PROCEDURE — 87804 INFLUENZA ASSAY W/OPTIC: CPT | Mod: 59

## 2017-04-09 PROCEDURE — 80053 COMPREHEN METABOLIC PANEL: CPT

## 2017-04-09 PROCEDURE — 85379 FIBRIN DEGRADATION QUANT: CPT

## 2017-04-09 PROCEDURE — 84443 ASSAY THYROID STIM HORMONE: CPT

## 2017-04-09 PROCEDURE — 84484 ASSAY OF TROPONIN QUANT: CPT

## 2017-04-09 RX ORDER — SODIUM CHLORIDE 9 MG/ML
1000 INJECTION, SOLUTION INTRAVENOUS CONTINUOUS
Status: DISCONTINUED | OUTPATIENT
Start: 2017-04-09 | End: 2017-04-09 | Stop reason: HOSPADM

## 2017-04-09 RX ORDER — POTASSIUM CHLORIDE 1500 MG/1
20 TABLET, EXTENDED RELEASE ORAL 2 TIMES DAILY
Qty: 20 TABLET | Refills: 0 | Status: SHIPPED | OUTPATIENT
Start: 2017-04-09 | End: 2017-04-19

## 2017-04-09 RX ORDER — MECLIZINE HYDROCHLORIDE 25 MG/1
25 TABLET ORAL ONCE
Status: COMPLETED | OUTPATIENT
Start: 2017-04-09 | End: 2017-04-09

## 2017-04-09 RX ORDER — POTASSIUM CHLORIDE 1500 MG/1
60 TABLET, EXTENDED RELEASE ORAL ONCE
Status: COMPLETED | OUTPATIENT
Start: 2017-04-09 | End: 2017-04-09

## 2017-04-09 RX ORDER — IBUPROFEN 600 MG/1
600 TABLET, FILM COATED ORAL ONCE
Status: COMPLETED | OUTPATIENT
Start: 2017-04-09 | End: 2017-04-09

## 2017-04-09 RX ORDER — IOPAMIDOL 612 MG/ML
100 INJECTION, SOLUTION INTRAVASCULAR ONCE
Status: COMPLETED | OUTPATIENT
Start: 2017-04-09 | End: 2017-04-09

## 2017-04-09 RX ORDER — ACETAMINOPHEN 325 MG/1
975 TABLET ORAL ONCE
Status: COMPLETED | OUTPATIENT
Start: 2017-04-09 | End: 2017-04-09

## 2017-04-09 RX ADMIN — SODIUM CHLORIDE 1000 ML: 9 INJECTION, SOLUTION INTRAVENOUS at 18:25

## 2017-04-09 RX ADMIN — MECLIZINE HYDROCHLORIDE 25 MG: 25 TABLET ORAL at 16:50

## 2017-04-09 RX ADMIN — IOPAMIDOL 100 ML: 612 INJECTION, SOLUTION INTRAVASCULAR at 18:42

## 2017-04-09 RX ADMIN — ACETAMINOPHEN 975 MG: 325 TABLET, FILM COATED ORAL at 19:21

## 2017-04-09 RX ADMIN — POTASSIUM CHLORIDE 10 MEQ: 14.9 INJECTION, SOLUTION, CONCENTRATE PARENTERAL at 17:34

## 2017-04-09 RX ADMIN — POTASSIUM CHLORIDE 60 MEQ: 20 TABLET, EXTENDED RELEASE ORAL at 18:27

## 2017-04-09 RX ADMIN — SODIUM CHLORIDE 1000 ML: 9 INJECTION, SOLUTION INTRAVENOUS at 16:50

## 2017-04-09 RX ADMIN — IBUPROFEN 600 MG: 600 TABLET ORAL at 19:22

## 2017-04-09 NOTE — ED AVS SNAPSHOT
[facility]    [address] [address]    Phone:  [phone]                                       Deanne Yee   MRN: [id number]    Department:  [facility]   Date of Visit:  4/9/2017           Patient Information     Date Of Birth          1975        Your diagnoses for this visit were:     Atypical chest pain     Hypokalemia        You were seen by Elizabet Hanks APRN FNP.      Follow-up Information     Follow up with Norberto Jaramillo MD In 1 day.    Specialty:  Hematology & Oncology    Why:  recheck    Contact information:    [facility]  [address]  [facility] [address] [phone]          Follow up with [facility].    Specialty:  EMERGENCY MEDICINE    Why:  As needed, If symptoms worsen    Contact information:    [address] [address] [phone]    Additional information:    From [place]: Take [address]. Turn left at [address]. Turn left at the first stoplight on [address]. At the first stop sign, take a right onto [address]. Take a left into the parking lot and continue through until you reach the North enterance of the building.       From [place]: Take [address]. Take the [address] ramp towards [place]. Turn left onto [address]. Take a slight right onto [address]. Turn left at the first stoplight on [address]. At the first stop sign, take a right onto [address]. Take a left into the parking lot and continue through until you reach the North enterance of the building.       From [place]: Take [address]. Take a right at [address]. At the first stop sign, take a right onto [address]. Take a left into the parking lot and continue through until you reach the North enterance of the building.         Discharge Instructions         See attached for home care  Rest today, slowly increase  activity as tolerated  Take potassium as prescribed  F/u with Dr. Rebolledo as scheduled  Return to ED as needed with worsening sx.     Discharge Instructions for Hypokalemia  You have been diagnosed with hypokalemia. This means you have a low level of potassium in your blood. Potassium helps your nerve and muscle cells work as they should. These cells include the cells in your heart. A low level of potassium in the blood can cause serious problems, such as abnormal heart rhythms and even heart attack.  Diet changes  Eat more potassium-rich foods:    Bananas    Oranges and orange juice    Tomatoes, tomato sauce, and tomato juice    Leafy green vegetables, such as spinach, kale, salad greens, collards, and chard    Melons (all kinds)    Pomegranates    Peas    Beans    Potatoes    Sweet potatoes    Avocados, including guacamole    Vegetable juices, such as V8    Fruit juices    All nuts and seeds    Fish, including tuna, halibut, salmon, cod, snapper, bill, swordfish, and perch    Milk, including fat-free, low-fat, whole, chocolate, and buttermilk    Soy milk  Other home care    Take a potassium supplement as directed by your healthcare provider.    After strenuous exercise or any activity that causes you to sweat a lot, grab a beverage high in potassium. This includes chocolate milk, coconut water, orange juice, or low-sodium vegetable juices.    Be sure to eat foods or drink fluids that contain potassium if you are having diarrhea or vomiting.    Have your potassium levels checked regularly.    Take all medicines exactly as directed.    Tell your healthcare provider about all prescription and ove-the-counter medicines you are taking. This includes herbal products.    Avoid foods that are high in salt. Avoid canned and prepared foods that are high in salt.  Follow-up    Make a follow-up appointment as directed by our staff.    Keep all follow-up appointments. Your healthcare provider needs to monitor your  condition closely.     When to call your healthcare provider  Call your provider right away or go to the emergency room if you have any of the following:    Vomiting    Fatigue    Diarrhea    Rapid, irregular heartbeat    Shortness of breath    Chest pain    Muscle cramps, spasms, or twitching    Weakness    Paralysis     2986-4708 [employer]. [address] [place] [address]. All rights reserved. This information is not intended as a substitute for professional medical care. Always follow your healthcare professional's instructions.           *CHEST PAIN, UNCERTAIN CAUSE    Based on your exam today, the exact cause of your chest pain is not certain. Your condition does not seem serious at this time, and your pain does not appear to be coming from your heart. However, sometimes the signs of a serious problem take more time to appear. Therefore, watch for the warning signs listed below.  HOME CARE:  1. Rest today and avoid strenuous activity.  2. Take any prescribed medicine as directed.  FOLLOW UP with your doctor in 1-3 days.   GET PROMPT MEDICAL ATTENTION if any of the following occur:    A change in the type of pain: if it feels different, becomes more severe, lasts longer, or begins to spread into your shoulder, arm, neck, jaw or back    Shortness of breath or increased pain with breathing    Weakness, dizziness, or fainting    Cough with blood or dark colored sputum (phlegm)    Fever over 101  F (38.3  C)    Swelling, pain or redness in one leg    2574-0827 [employer], [address] [place] [address]. All rights reserved. This information is not intended as a substitute for professional medical care. Always follow your healthcare professional's instructions.  What to expect when you have contrast    During your exam, we will inject  contrast  into your vein or artery. (Contrast is a clear liquid with iodine in it. It shows up on X-rays.)    You may feel warm or hot. You  may have a metal taste in your mouth and a slight upset stomach. You may also feel pressure near the kidneys and bladder. These effects will last about 1 to 3 minutes.    Please tell us if you have:    Sneezing     Itching    Hives     Swelling in the face    A hoarse voice    Breathing problems    Other new symptoms    Serious problems are rare.  They may include:    Irregular heartbeat     Seizures    Kidney failure              Tissue damage    Shock      Death    If you have any problems during the exam, we  will treat them right away.    When you get home    Call your hospital if you have any new symptoms in the next 2 days, like hives or swelling. (Phone numbers are at the bottom of this page.) Or call your family doctor.     If you have wheezing or trouble breathing, call 911.    Self-care  -Drink at least 4 extra glasses of water today.   This reduces the stress on your kidneys.  -Keep taking your regular medicines.    The contrast will pass out of your body in your  Urine(pee). This will happen in the next 24 hours. You  will not feel this. Your urine will not  change color.    If you have kidney problems or take metformin    Drink 4 to 8 large glasses of water for the next  2 days, if you are not on a fluid restriction.    ?If you take metformin (Glucophage or Glucovance) for diabetes, keep taking it.      ?Your kidney function tests are abnormal.  If you take Metformin, do not take it for 48 hours. Please go to your clinic for a blood test within 3 days after your exam before the restarting this medicine.     (Note to provider:please give patient prescription for lab tests.)    ?Special instructions: DRINK EXTRA WATER TODAY    I have read and understand the above information.    Patient Sign Here:______________________________________Date:________Time:______    Staff Sign Here:________________________________________Date:_______Time:______      Radiology Departments:     ?[facility]:  [phone] ?[facility]: [phone]     ?[place]: [phone] ?[facility]:[phone]      ?[place]: [phone]  ?[place]: [phone]  ?[place]:[phone]    ?[facility]:[phone]  ?[facility]:[phone]    Future Appointments        Provider Department Dept Phone Center    4/10/2017 11:00 AM Norberto Jaramillo MD [facility] [phone] [place]    4/19/2017 12:00 PM [facility] Nuc Med [facility] Nuclear Medicine [phone] [facility]    5/3/2017 8:00 AM [facility] Ultrasound 3 [facility] Ultrasound [phone] [facility]    5/3/2017 9:00 AM [facility] Nuc Med [facility] Nuclear Medicine [phone] [facility]         Review of your medicines      START taking        Dose / Directions Last dose taken    potassium chloride SA 20 MEQ CR tablet   Commonly known as:  potassium chloride   Dose:  20 mEq   Quantity:  20 tablet        Take 1 tablet (20 mEq) by mouth 2 times daily for 10 days   Refills:  0          Our records show that you are taking the medicines listed below. If these are incorrect, please call your family doctor or clinic.        Dose / Directions Last dose taken    amoxicillin-clavulanate 875-125 MG per tablet   Commonly known as:  AUGMENTIN   Dose:  1 tablet   Quantity:  28 tablet        Take 1 tablet by mouth 2 times daily   Refills:  0        ARMOUR THYROID 30 MG tablet   Quantity:  90 tablet   Generic drug:  thyroid        TAKE 1 TABLET BY MOUTH EVERY DAY   Refills:  0        CHILDRENS MULTIVITAMIN PO   Dose:  2 tablet        Take 2 tablets by mouth daily   Refills:  0        HYDROcodone-acetaminophen 5-325 MG per tablet   Commonly known as:  NORCO   Dose:  1 tablet   Quantity:  20 tablet        Take 1 tablet by mouth 2 times daily as needed for moderate to severe pain   Refills:  0        ranitidine 300 MG tablet   Commonly known as:  ZANTAC   Dose:  300 mg   Quantity:  30 tablet        Take 1 tablet (300 mg) by mouth At Bedtime   Refills:  1        * sertraline 50 MG tablet   Commonly known  as:  ZOLOFT   Dose:  75 mg   Quantity:  135 tablet        Take 1.5 tablets (75 mg) by mouth daily   Refills:  1        * sertraline 25 MG tablet   Commonly known as:  ZOLOFT   Quantity:  90 tablet        TAKE 1 TABLET BY MOUTH EVERY DAY ALONG WITH THE 50MG TABLET   Refills:  1        VITAMIN D3 PO   Dose:  1 tablet        Take 1 tablet by mouth daily   Refills:  0        * Notice:  This list has 2 medication(s) that are the same as other medications prescribed for you. Read the directions carefully, and ask your doctor or other care provider to review them with you.            Prescriptions were sent or printed at these locations (1 Prescription)                   [facility] - [place] - [address]   [address] [place] [address]    Telephone:  [phone]   Fax:  [phone]   Hours:                  E-Prescribed (1 of 1)         potassium chloride SA (POTASSIUM CHLORIDE) 20 MEQ CR tablet                Procedures and tests performed during your visit     CBC with platelets differential    CRP inflammation    CTA Angiogram Abdomen/Pelvis    CTA Angiogram Chest w/o & w Contrast    Comprehensive metabolic panel    D-Dimer ([facility] [place])    Drug Screen Urine ([place])    INR    Influenza A/B antigen    Lipase    Partial thromboplastin time    TSH    Troponin I    UA reflex to Microscopic and Culture    XR Chest 2 Views      Orders Needing Specimen Collection     None      Pending Results     Date and Time Order Name Status Description    4/9/2017 1805 CTA Angiogram Abdomen/Pelvis In process     4/9/2017 1805 CTA Angiogram Chest w/o & w Contrast In process     4/9/2017 1642 XR Chest 2 Views In process             Pending Culture Results     No orders found from 4/7/2017 to 4/10/2017.            Thank you for choosing [facility]       Thank you for choosing [facility] for your care. Our goal is always to provide you with excellent care. Hearing back from our patients is one way we can continue to  "improve our services. Please take a few minutes to complete the written survey that you may receive in the mail after you visit with us. Thank you!        [employer] Information     [employer] lets you send messages to your doctor, view your test results, renew your prescriptions, schedule appointments and more. To sign up, go to [email or web address] . Click on \"Log in\" on the left side of the screen, which will take you to the Welcome page. Then click on \"Sign up Now\" on the right side of the page.     You will be asked to enter the access code listed below, as well as some personal information. Please follow the directions to create your username and password.     Your access code is: [email or web address]  Expires: 2017  4:28 PM     Your access code will  in 90 days. If you need help or a new code, please call your [facility] clinic or [phone].        Care EveryWhere ID     This is your Care EveryWhere ID. This could be used by other organizations to access your [facility] medical records  [id number]        After Visit Summary       This is your record. Keep this with you and show to your community pharmacist(s) and doctor(s) at your next visit.                  "

## 2017-04-09 NOTE — ED AVS SNAPSHOT
[facility]    [address] [address]    Phone:  [phone]                                       Deanne Yee   MRN: [id number]    Department[facility]  [facility]   Date of Visit:  4/9/2017           After Visit Summary Signature Page     I have received my discharge instructions, and my questions have been answered. I have discussed any challenges I see with this plan with the nurse or doctor.    ..........................................................................................................................................  Patient/Patient Representative Signature      ..........................................................................................................................................  Patient Representative Print Name and Relationship to Patient    ..................................................               ................................................  Date                                            Time    ..........................................................................................................................................  Reviewed by Signature/Title    ...................................................              ..............................................  Date                                                            Time

## 2017-04-09 NOTE — ED NOTES
"40 y/o patient assisted from car to stretcher with c/o SOB and chest heaviness. Denies pain states \"it just feels heavy\" Patient noticeably anxious. States she was seen on Friday, had an MRI of her head due to dizziness which was WNL. Also states she has had 3 elevated D Dimers in the last 1.5 weeks. States she had a clear chest CT.  Patient attempted to get an echo done with [facility], but was told no openings until May. Patient also c/o tingling and numbness in bilateral hands and feet.     Given paper bag into and instructed to breathe normally for 5 minutes at this time.  and daughter at bedside.  "

## 2017-04-09 NOTE — PROVIDER NOTIFICATION
DATE:  4/9/2017   TIME OF RECEIPT FROM LAB:  4589  LAB TEST:  potassium  LAB VALUE:  2.9  RESULTS GIVEN WITH READ-BACK TO (PROVIDER):  MARIKA Hanks  TIME LAB VALUE REPORTED TO PROVIDER:   5245

## 2017-04-09 NOTE — ED PROVIDER NOTES
"  History     Chief Complaint   Patient presents with     Numbness     Bilateral upper extremities     Chest Pain     HPI  Deanne Yee is a 41 year old female who presents to ED via private car with  for evaluation of near syncope episode, shortness of breath, chest heaviness. Sudden onset of sx 2 hours PTA. Also reports to all extremity \"numbness\", tingling sensation. Feels weak, no n/v/d.   Pt had similar episode 10 days ago while driving home from [place], was seen here, extensive work up, found to have elevated D dimer, 2 right sided healing rib fractures. She has been seen by PCP since then with multiple tests, scans. Other than elevated fibrinogen, all results have been normal. she has an appointment with Dr. Rebolledo tomorrow for consult.  Pt denies fever, no n/v/d/c. No abdominal pain. Normal b/b.     I have reviewed the Medications, Allergies, Past Medical and Surgical History, and Social History in the Epic system.    Review of Systems   Constitutional: Positive for activity change. Negative for fever.   HENT: Negative for congestion.    Eyes: Negative for redness.   Respiratory: Positive for chest tightness and shortness of breath.    Cardiovascular: Positive for chest pain.   Gastrointestinal: Negative for abdominal pain.   Genitourinary: Negative for difficulty urinating.   Musculoskeletal: Negative for arthralgias and neck stiffness.   Skin: Negative for color change.   Neurological: Positive for dizziness, weakness, light-headedness, numbness and headaches.   Psychiatric/Behavioral: Negative for confusion.       Physical Exam   BP: 148/87  Pulse: 108  Temp: 97.8  F (36.6  C)  Resp: 20  SpO2: 100 %  Physical Exam   Constitutional: No distress.   HENT:   Head: Atraumatic.   Right Ear: External ear normal.   Left Ear: External ear normal.   Eyes: Conjunctivae and EOM are normal. Pupils are equal, round, and reactive to light.   Neck: Normal range of motion. Neck supple. No thyromegaly " present.   Cardiovascular: Normal rate and regular rhythm.    Pulmonary/Chest: Effort normal and breath sounds normal. No respiratory distress.   Abdominal: Soft. Bowel sounds are normal. There is no tenderness.   Musculoskeletal: Normal range of motion.   Lymphadenopathy:     She has no cervical adenopathy.   Neurological: She is alert.   Skin: Skin is warm and dry. No rash noted.   Nursing note and vitals reviewed.    ED Course     Procedures             EKG Interpretation:      Interpreted by Elizabet Hanks  Time reviewed: 1640  Symptoms at time of EKG: chest heaviness, dyspnea, near syncope   Rhythm: normal sinus   Rate: 96  Axis: Normal  Ectopy: none  Conduction: normal  ST Segments/ T Waves: Non-specific ST-T wave changes  Q Waves: none  Comparison to prior: Unchanged    Clinical Impression: normal EKG    Labs Ordered and Resulted from Time of ED Arrival Up to the Time of Departure from the ED   D-DIMER ([facility] [place]) - Abnormal; Notable for the following:        Result Value    D-Dimer ng/mL 3936 (*)     All other components within normal limits   COMPREHENSIVE METABOLIC PANEL - Abnormal; Notable for the following:     Potassium 2.9 (*)     Glucose 107 (*)     Urea Nitrogen 6 (*)     All other components within normal limits   UA MACROSCOPIC WITH REFLEX TO MICRO AND CULTURE - Abnormal; Notable for the following:     Specific Gravity Urine 1.002 (*)     All other components within normal limits   CBC WITH PLATELETS DIFFERENTIAL   INR   PARTIAL THROMBOPLASTIN TIME   LIPASE   TROPONIN I   TSH   CRP INFLAMMATION   DRUG SCREEN URINE ([facility])   INFLUENZA A/B ANTIGEN     Assessments & Plan (with Medical Decision Making)   Pt presents with acute onset dizziness, near syncope, sob, chest heaviness. Initial , then to 70's, ECG NSR, no ectopy. She is 1 year post partum, 6 months out from pneumonia dx, 10 days ago had similar episode of dizziness, d-dimer elevated, ct chest negative for PE. Extensive work up per PCP,  Dr. Burger.   Labs obtained today show decreasing D-dimer of 3936 (5250 on 4/6/17), K+ 2.9. Remaining labs unremarkable. Repeat CT chest again show no PE, ct abdomen pelvis normal other than cyst in left liver lobe.   IV fluids, IV potassium administered in the ED. Tylenol and Ibuprofen given for headache.   Pt observed for greater than 3 hours, all sx resolved. Given that pt is feeling better with stable labs, treated K+, appointed with Dr. Rebolledo in the AM, will d/c to home. Pt verbalizes understanding and agrees with plan.  Epic discharge instructions given. Pt discharged in stable condition.     I have reviewed the nursing notes.    I have reviewed the findings, diagnosis, plan and need for follow up with the patient.    New Prescriptions    POTASSIUM CHLORIDE SA (POTASSIUM CHLORIDE) 20 MEQ CR TABLET    Take 1 tablet (20 mEq) by mouth 2 times daily for 10 days       Final diagnoses:   Atypical chest pain   Hypokalemia     See attached for home care  Rest today, slowly increase activity as tolerated  Take potassium as prescribed  F/u with Dr. Rebolledo as scheduled  Return to ED as needed with worsening sx.     4/9/2017   [facility]     Elizabet Hanks APRN FNNAIMA  04/11/17 3568

## 2017-04-10 ENCOUNTER — ONCOLOGY VISIT (OUTPATIENT)
Dept: ONCOLOGY | Facility: OTHER | Age: 42
End: 2017-04-10
Attending: FAMILY MEDICINE
Payer: COMMERCIAL

## 2017-04-10 VITALS
SYSTOLIC BLOOD PRESSURE: 119 MMHG | BODY MASS INDEX: 24.66 KG/M2 | HEART RATE: 68 BPM | OXYGEN SATURATION: 100 % | HEIGHT: 65 IN | WEIGHT: 148 LBS | DIASTOLIC BLOOD PRESSURE: 75 MMHG | RESPIRATION RATE: 16 BRPM | TEMPERATURE: 98.1 F

## 2017-04-10 DIAGNOSIS — R07.89 CHEST WALL PAIN: ICD-10-CM

## 2017-04-10 DIAGNOSIS — R79.89 ELEVATED D-DIMER: ICD-10-CM

## 2017-04-10 DIAGNOSIS — R10.84 ABDOMINAL PAIN, GENERALIZED: Primary | ICD-10-CM

## 2017-04-10 DIAGNOSIS — S22.41XD CLOSED FRACTURE OF MULTIPLE RIBS OF RIGHT SIDE WITH ROUTINE HEALING, SUBSEQUENT ENCOUNTER: ICD-10-CM

## 2017-04-10 DIAGNOSIS — R61 NIGHT SWEATS: ICD-10-CM

## 2017-04-10 DIAGNOSIS — J32.9 CHRONIC SINUSITIS, UNSPECIFIED LOCATION: ICD-10-CM

## 2017-04-10 LAB
ALBUMIN SERPL ELPH-MCNC: 4.9 G/DL (ref 3.7–5.1)
ALPHA1 GLOB SERPL ELPH-MCNC: 0.3 G/DL (ref 0.2–0.4)
ALPHA2 GLOB SERPL ELPH-MCNC: 0.6 G/DL (ref 0.5–0.9)
B-GLOBULIN SERPL ELPH-MCNC: 0.9 G/DL (ref 0.6–1)
D DIMER PPP DDU-MCNC: 3799 NG/ML D-DU (ref 0–300)
FIBRINOGEN PPP-MCNC: 417 MG/DL (ref 200–420)
GAMMA GLOB SERPL ELPH-MCNC: 1.5 G/DL (ref 0.7–1.6)
LDH SERPL L TO P-CCNC: 183 U/L (ref 81–234)
M PROTEIN SERPL ELPH-MCNC: 0 G/DL
PROT PATTERN SERPL ELPH-IMP: NORMAL

## 2017-04-10 PROCEDURE — 99205 OFFICE O/P NEW HI 60 MIN: CPT | Performed by: INTERNAL MEDICINE

## 2017-04-10 PROCEDURE — 85300 ANTITHROMBIN III ACTIVITY: CPT | Mod: 90 | Performed by: INTERNAL MEDICINE

## 2017-04-10 PROCEDURE — 85306 CLOT INHIBIT PROT S FREE: CPT | Mod: 90 | Performed by: INTERNAL MEDICINE

## 2017-04-10 PROCEDURE — 85613 RUSSELL VIPER VENOM DILUTED: CPT | Mod: 90 | Performed by: INTERNAL MEDICINE

## 2017-04-10 PROCEDURE — 81240 F2 GENE: CPT | Mod: 90 | Performed by: INTERNAL MEDICINE

## 2017-04-10 PROCEDURE — 86665 EPSTEIN-BARR CAPSID VCA: CPT | Mod: 90 | Performed by: INTERNAL MEDICINE

## 2017-04-10 PROCEDURE — 86665 EPSTEIN-BARR CAPSID VCA: CPT | Mod: 59 | Performed by: INTERNAL MEDICINE

## 2017-04-10 PROCEDURE — 85730 THROMBOPLASTIN TIME PARTIAL: CPT | Mod: 59 | Performed by: INTERNAL MEDICINE

## 2017-04-10 PROCEDURE — 85730 THROMBOPLASTIN TIME PARTIAL: CPT | Mod: 90 | Performed by: INTERNAL MEDICINE

## 2017-04-10 PROCEDURE — 83090 ASSAY OF HOMOCYSTEINE: CPT | Mod: 90 | Performed by: INTERNAL MEDICINE

## 2017-04-10 PROCEDURE — 81241 F5 GENE: CPT | Mod: 90 | Performed by: INTERNAL MEDICINE

## 2017-04-10 PROCEDURE — 81291 MTHFR GENE: CPT | Mod: 90 | Performed by: INTERNAL MEDICINE

## 2017-04-10 PROCEDURE — 85303 CLOT INHIBIT PROT C ACTIVITY: CPT | Mod: 90 | Performed by: INTERNAL MEDICINE

## 2017-04-10 PROCEDURE — 85384 FIBRINOGEN ACTIVITY: CPT | Performed by: INTERNAL MEDICINE

## 2017-04-10 PROCEDURE — 86664 EPSTEIN-BARR NUCLEAR ANTIGEN: CPT | Mod: 90 | Performed by: INTERNAL MEDICINE

## 2017-04-10 PROCEDURE — 99000 SPECIMEN HANDLING OFFICE-LAB: CPT | Performed by: INTERNAL MEDICINE

## 2017-04-10 PROCEDURE — 83615 LACTATE (LD) (LDH) ENZYME: CPT | Performed by: INTERNAL MEDICINE

## 2017-04-10 PROCEDURE — 85610 PROTHROMBIN TIME: CPT | Performed by: INTERNAL MEDICINE

## 2017-04-10 PROCEDURE — 36415 COLL VENOUS BLD VENIPUNCTURE: CPT | Performed by: INTERNAL MEDICINE

## 2017-04-10 PROCEDURE — 87040 BLOOD CULTURE FOR BACTERIA: CPT | Performed by: INTERNAL MEDICINE

## 2017-04-10 PROCEDURE — 85670 THROMBIN TIME PLASMA: CPT | Mod: 90 | Performed by: INTERNAL MEDICINE

## 2017-04-10 PROCEDURE — 85379 FIBRIN DEGRADATION QUANT: CPT | Mod: 91 | Performed by: INTERNAL MEDICINE

## 2017-04-10 NOTE — ED NOTES
Pt verbalized understanding of all discharge instructions. Pt to  script in am for potassium.  No concerns.

## 2017-04-10 NOTE — PATIENT INSTRUCTIONS
We would like to see you back in one week to review labs and scan results. If you have any questions please call [phone].

## 2017-04-10 NOTE — NURSING NOTE
Patient c/o feeling light headed after lab draw.  B/P 122/82.  Placed in supine position.  Juice provided.  States feeling better after 5 minutes.  Sapna Blanc

## 2017-04-10 NOTE — NURSING NOTE
"Chief Complaint   Patient presents with     [facility]     New patient elevated D dimer  Dr. Romero referral     Numbness     Patient has had tingling and numbness in arms and legs for about two weeks       Initial /81 (BP Location: Right arm, Patient Position: Chair, Cuff Size: Adult Regular)  Pulse 68  Temp 98.1  F (36.7  C) (Tympanic)  Resp 16  Ht 1.651 m (5' 5\")  Wt 67.1 kg (148 lb)  SpO2 100%  BMI 24.63 kg/m2 Estimated body mass index is 24.63 kg/(m^2) as calculated from the following:    Height as of this encounter: 1.651 m (5' 5\").    Weight as of this encounter: 67.1 kg (148 lb).  Medication Reconciliation: complete     Queta Vaz      Patient was assessed using the NCCN psychosocial distress thermometer. Patient rated the score as a 7. Patient rated current stressors as stressful. Stressors will be brought to the attention of provider or Oncology RN Care Coordinator for a score of 6 or greater or per nurses discretion.     Queta Vaz        "

## 2017-04-10 NOTE — DISCHARGE INSTRUCTIONS
See attached for home care  Rest today, slowly increase activity as tolerated  Take potassium as prescribed  F/u with Dr. Rebolledo as scheduled  Return to ED as needed with worsening sx.     Discharge Instructions for Hypokalemia  You have been diagnosed with hypokalemia. This means you have a low level of potassium in your blood. Potassium helps your nerve and muscle cells work as they should. These cells include the cells in your heart. A low level of potassium in the blood can cause serious problems, such as abnormal heart rhythms and even heart attack.  Diet changes  Eat more potassium-rich foods:    Bananas    Oranges and orange juice    Tomatoes, tomato sauce, and tomato juice    Leafy green vegetables, such as spinach, kale, salad greens, collards, and chard    Melons (all kinds)    Pomegranates    Peas    Beans    Potatoes    Sweet potatoes    Avocados, including guacamole    Vegetable juices, such as V8    Fruit juices    All nuts and seeds    Fish, including tuna, halibut, salmon, cod, snapper, bill, swordfish, and perch    Milk, including fat-free, low-fat, whole, chocolate, and buttermilk    Soy milk  Other home care    Take a potassium supplement as directed by your healthcare provider.    After strenuous exercise or any activity that causes you to sweat a lot, grab a beverage high in potassium. This includes chocolate milk, coconut water, orange juice, or low-sodium vegetable juices.    Be sure to eat foods or drink fluids that contain potassium if you are having diarrhea or vomiting.    Have your potassium levels checked regularly.    Take all medicines exactly as directed.    Tell your healthcare provider about all prescription and ove-the-counter medicines you are taking. This includes herbal products.    Avoid foods that are high in salt. Avoid canned and prepared foods that are high in salt.  Follow-up    Make a follow-up appointment as directed by our staff.    Keep all follow-up  appointments. Your healthcare provider needs to monitor your condition closely.     When to call your healthcare provider  Call your provider right away or go to the emergency room if you have any of the following:    Vomiting    Fatigue    Diarrhea    Rapid, irregular heartbeat    Shortness of breath    Chest pain    Muscle cramps, spasms, or twitching    Weakness    Paralysis     [id number] [employer]. [address] [place] [address]. All rights reserved. This information is not intended as a substitute for professional medical care. Always follow your healthcare professional's instructions.           *CHEST PAIN, UNCERTAIN CAUSE    Based on your exam today, the exact cause of your chest pain is not certain. Your condition does not seem serious at this time, and your pain does not appear to be coming from your heart. However, sometimes the signs of a serious problem take more time to appear. Therefore, watch for the warning signs listed below.  HOME CARE:  1. Rest today and avoid strenuous activity.  2. Take any prescribed medicine as directed.  FOLLOW UP with your doctor in 1-3 days.   GET PROMPT MEDICAL ATTENTION if any of the following occur:    A change in the type of pain: if it feels different, becomes more severe, lasts longer, or begins to spread into your shoulder, arm, neck, jaw or back    Shortness of breath or increased pain with breathing    Weakness, dizziness, or fainting    Cough with blood or dark colored sputum (phlegm)    Fever over 101  F (38.3  C)    Swelling, pain or redness in one leg    [id number] [facility] [employer], [address] [place] [address]. All rights reserved. This information is not intended as a substitute for professional medical care. Always follow your healthcare professional's instructions.  What to expect when you have contrast    During your exam, we will inject  contrast  into your vein or artery. (Contrast is a clear liquid with iodine in  it. It shows up on X-rays.)    You may feel warm or hot. You may have a metal taste in your mouth and a slight upset stomach. You may also feel pressure near the kidneys and bladder. These effects will last about 1 to 3 minutes.    Please tell us if you have:    Sneezing     Itching    Hives     Swelling in the face    A hoarse voice    Breathing problems    Other new symptoms    Serious problems are rare.  They may include:    Irregular heartbeat     Seizures    Kidney failure              Tissue damage    Shock      Death    If you have any problems during the exam, we  will treat them right away.    When you get home    Call your hospital if you have any new symptoms in the next 2 days, like hives or swelling. (Phone numbers are at the bottom of this page.) Or call your family doctor.     If you have wheezing or trouble breathing, call 911.    Self-care  -Drink at least 4 extra glasses of water today.   This reduces the stress on your kidneys.  -Keep taking your regular medicines.    The contrast will pass out of your body in your  Urine(pee). This will happen in the next 24 hours. You  will not feel this. Your urine will not  change color.    If you have kidney problems or take metformin    Drink 4 to 8 large glasses of water for the next  2 days, if you are not on a fluid restriction.    ?If you take metformin (Glucophage or Glucovance) for diabetes, keep taking it.      ?Your kidney function tests are abnormal.  If you take Metformin, do not take it for 48 hours. Please go to your clinic for a blood test within 3 days after your exam before the restarting this medicine.     (Note to provider:please give patient prescription for lab tests.)    ?Special instructions: DRINK EXTRA WATER TODAY    I have read and understand the above information.    Patient Sign Here:______________________________________Date:________Time:______    Staff Sign  Here:________________________________________Date:_______Time:______      Radiology Departments:     ?[facility]: [phone] ?[place]: [phone]     ?[place]: [phone] ?[facility]:[phone]      ?[place]: [phone]  ?[place]: [phone]  ?[place]:[phone]    ?[facility] [place]:[phone]  ?[facility]:[phone]

## 2017-04-10 NOTE — MR AVS SNAPSHOT
After Visit Summary   4/10/2017    Deanne eYe    MRN: [id number]           Patient Information     Date Of Birth          1975        Visit Information        Provider Department      4/10/2017 11:00 AM Norberto Jaramillo MD [facility]        Today's Diagnoses     Abdominal pain, generalized    -  1    Elevated d-dimer        Chest wall pain        Closed fracture of multiple ribs of right side with routine healing, subsequent encounter        Night sweats        Chronic sinusitis, unspecified location          Care Instructions    We would like to see you back in one week to review labs and scan results. If you have any questions please call [phone].          Follow-ups after your visit        Your next 10 appointments already scheduled     Apr 11, 2017  3:30 PM CDT   Radiology with HI MRI   HI MRI ([facility] )    [address] [id number] [phone]            Apr 11, 2017  4:30 PM CDT   Radiology with HI CT SCAN   HI CT Scan ([facility] )    [address] [id number] [phone]            Apr 18, 2017  3:30 PM CDT   Return Visit with Norberto Jaramillo MD   [facility] ([facility])    [address] [facility] [facility] [address]   [phone]            Apr 19, 2017 12:00 PM CDT   Radiology with HI NUC MED   HI Nuclear Medicine ([facility] )    [address] [id number] [phone]            May 03, 2017  8:00 AM CDT   Radiology with HI ECHO RM   HI Ultrasound ([facility] )    [address] [place] [address]   [phone]            May 03, 2017  9:00 AM CDT   Radiology with HI NUC MED   HI Nuclear Medicine ([facility] )    [address] [id number] [phone]              Who to contact     If you have questions or need follow up information about today's clinic visit or your schedule please  "contact [facility] [place] directly at [phone].  Normal or non-critical lab and imaging results will be communicated to you by MyChart, letter or phone within 4 business days after the clinic has received the results. If you do not hear from us within 7 days, please contact the clinic through MyChart or phone. If you have a critical or abnormal lab result, we will notify you by phone as soon as possible.  Submit refill requests through [employer] or call your pharmacy and they will forward the refill request to us. Please allow 3 business days for your refill to be completed.          Additional Information About Your Visit        [employer] Information     [employer] lets you send messages to your doctor, view your test results, renew your prescriptions, schedule appointments and more. To sign up, go to [email or web address] . Click on \"Log in\" on the left side of the screen, which will take you to the Welcome page. Then click on \"Sign up Now\" on the right side of the page.     You will be asked to enter the access code listed below, as well as some personal information. Please follow the directions to create your username and password.     Your access code is: [email or web address]  Expires: 2017  4:28 PM     Your access code will  in 90 days. If you need help or a new code, please call your [place] clinic or [phone].        Care EveryWhere ID     This is your Care EveryWhere ID. This could be used by other organizations to access your [place] medical records  [id number]        Your Vitals Were     Pulse Temperature Respirations Height Pulse Oximetry BMI (Body Mass Index)    68 98.1  F (36.7  C) (Tympanic) 16 1.651 m (5' 5\") 100% 24.63 kg/m2       Blood Pressure from Last 3 Encounters:   04/10/17 123/81   17 126/83   17 110/70    Weight from Last 3 Encounters:   04/10/17 67.1 kg (148 lb)   17 64.4 kg (142 lb)   17 64.4 kg (142 lb)              We Performed the Following     " Antithrombin III     Blood culture     Blood culture     CT Soft tissue neck w & w/o contrast     CT Soft tissue neck w contrast*     D dimer quantitative     D-Dimer (FV [place])     EBV Capsid Antibody IgG     EBV Capsid Antibody IgM     EBV Nuclear Antigen EBNA Antibody IgG     ELP reflex to IELP     F2 prothrombin 43084I Mut Anal     Factor 5 leiden mutation analysis     Fibrinogen activity     Homocysteine     Lactate Dehydrogenase     Lupus panel     MRI Liver  w & w/o contrast*     MTHFR genotype     Protein C chromogenic     Protein S Antigen Free        Primary Care Provider Office Phone # Fax #    Astrid Romero [phone]       [facility] [place] [address]  [place] [address]        Thank you!     Thank you for choosing [facility] [place]  for your care. Our goal is always to provide you with excellent care. Hearing back from our patients is one way we can continue to improve our services. Please take a few minutes to complete the written survey that you may receive in the mail after your visit with us. Thank you!             Your Updated Medication List - Protect others around you: Learn how to safely use, store and throw away your medicines at www.disposemymeds.org.          This list is accurate as of: 4/10/17 12:59 PM.  Always use your most recent med list.                   Brand Name Dispense Instructions for use    amoxicillin-clavulanate 875-125 MG per tablet    AUGMENTIN    28 tablet    Take 1 tablet by mouth 2 times daily       ARMOUR THYROID 30 MG tablet   Generic drug:  thyroid     90 tablet    TAKE 1 TABLET BY MOUTH EVERY DAY       CHILDRENS MULTIVITAMIN PO      Take 2 tablets by mouth daily       HYDROcodone-acetaminophen 5-325 MG per tablet    NORCO    20 tablet    Take 1 tablet by mouth 2 times daily as needed for moderate to severe pain       potassium chloride SA 20 MEQ CR tablet    potassium chloride    20 tablet    Take 1 tablet (20 mEq) by mouth 2  times daily for 10 days       ranitidine 300 MG tablet    ZANTAC    30 tablet    Take 1 tablet (300 mg) by mouth At Bedtime       * sertraline 50 MG tablet    ZOLOFT    135 tablet    Take 1.5 tablets (75 mg) by mouth daily       * sertraline 25 MG tablet    ZOLOFT    90 tablet    TAKE 1 TABLET BY MOUTH EVERY DAY ALONG WITH THE 50MG TABLET       VITAMIN D3 PO      Take 1 tablet by mouth daily       * Notice:  This list has 2 medication(s) that are the same as other medications prescribed for you. Read the directions carefully, and ask your doctor or other care provider to review them with you.

## 2017-04-11 ENCOUNTER — HOSPITAL ENCOUNTER (OUTPATIENT)
Dept: MRI IMAGING | Facility: HOSPITAL | Age: 42
Discharge: HOME | End: 2017-04-11
Attending: INTERNAL MEDICINE | Admitting: INTERNAL MEDICINE
Payer: COMMERCIAL

## 2017-04-11 ENCOUNTER — HOSPITAL ENCOUNTER (OUTPATIENT)
Dept: CT IMAGING | Facility: HOSPITAL | Age: 42
End: 2017-04-11
Attending: INTERNAL MEDICINE
Payer: COMMERCIAL

## 2017-04-11 LAB — D DIMER PPP FEU-MCNC: 4.4 UG/ML FEU (ref 0–0.5)

## 2017-04-11 PROCEDURE — A9585 GADOBUTROL INJECTION: HCPCS | Mod: TC

## 2017-04-11 PROCEDURE — 70491 CT SOFT TISSUE NECK W/DYE: CPT | Mod: TC

## 2017-04-11 PROCEDURE — 74183 MRI ABD W/O CNTR FLWD CNTR: CPT | Mod: TC

## 2017-04-11 RX ORDER — IOPAMIDOL 612 MG/ML
100 INJECTION, SOLUTION INTRAVASCULAR ONCE
Status: COMPLETED | OUTPATIENT
Start: 2017-04-11 | End: 2017-04-11

## 2017-04-11 RX ORDER — GADOBUTROL 604.72 MG/ML
10 INJECTION INTRAVENOUS ONCE
Status: COMPLETED | OUTPATIENT
Start: 2017-04-11 | End: 2017-04-11

## 2017-04-11 RX ADMIN — GADOBUTROL 10 ML: 604.72 INJECTION INTRAVENOUS at 16:08

## 2017-04-11 RX ADMIN — IOPAMIDOL 100 ML: 612 INJECTION, SOLUTION INTRAVASCULAR at 16:19

## 2017-04-11 ASSESSMENT — ENCOUNTER SYMPTOMS
DIFFICULTY URINATING: 0
COLOR CHANGE: 0
SHORTNESS OF BREATH: 1
LIGHT-HEADEDNESS: 1
WEAKNESS: 1
CHEST TIGHTNESS: 1
ARTHRALGIAS: 0
NECK STIFFNESS: 0
FEVER: 0
NUMBNESS: 1
ABDOMINAL PAIN: 0
CONFUSION: 0
ACTIVITY CHANGE: 1
EYE REDNESS: 0
HEADACHES: 1
DIZZINESS: 1

## 2017-04-11 NOTE — PROGRESS NOTES
DATE OF SERVICE:  04/10/2017      REASON FOR CONSULTATION:  Elevated D-dimer.      REQUESTING PHYSICIAN:  Dr. Astrid Romero.      HISTORY OF PRESENT ILLNESS:  Ms. Deanne Yee is a 41-year-old white female with a history of asthma, migraine headaches, pneumonia.  We were asked to evaluate concerning elevated D-dimer.  Initially she had seen Dr. Astrid Romero on 10/22/2016 with complaints of cough and right rib cage pain.  Chest x-ray was obtained and revealed evidence of pneumonia with right lower lobe infiltrates.  She was treated empirically with Z-CASSIDY and subsequently she was seen in the emergency room on 03/29 with complaints of sinus symptoms, cough which has now gone away, but dizziness to the point where she had to pull over in her car.  She felt like she was going to faint.  She was seen by HOLLY Tarango, who ordered a chest x-ray initially which revealed a granuloma in the right lower lobe and a D-dimer which came back extremely elevated at 4100 and she ordered a CTA of the chest which revealed no evidence of pulmonary embolus.  There were healing fractures involving anterior margin of right 6th and 7th ribs which could account for the right-sided chest.  There was a calcified hematoma in the right lower lobe and a 13 mm cyst in the left lobe of the liver.   Subsequently followup with Dr. Romero was done on 04/06.  At that time a D-dimer was greater than 5100 and she performed lab work including a serum protein electrophoresis which was negative, CRP which was slightly elevated, sed rate was normal.  LDH was normal.  Blood morphology was essentially negative.  There were no schistocytes noted.  EKG was normal.  She then had complaints of abdominal pain, right upper quadrant.  Ultrasound of the abdomen was essentially negative.  This was repeated again on 04/07 and was negative.  She was subsequently seen in the emergency room yesterday with complaints of near syncope, shortness of breath, chest  heaviness and numbness and tingling sensation.  She was seen by a nurse practitioner Elizabet Hanks.  EKG was normal sinus rhythm.  Workup also included a CTA of the chest which revealed no evidence of pulmonary emboli.  CTA of the abdomen was also ordered and this revealed no evidence of emboli.  There was a probable cyst in the left lobe of the liver.  She had an MRI of the brain which revealed nonspecific findings.  There was extensive mucosal thickness in both maxillary and ethmoid, right frontal and right sphenoid sinuses.  She also had bilateral venous Dopplers on 04/07 which were negative.  Repeat D-dimer was drawn yesterday and this was improved to 3,936.  Fibrinogen had also been ordered which was slightly elevated at 433.  B12 was also ordered and this was negative.  Beta hCG urine test was negative.  The patient says she is currently menstruating and they are not using any birth control at this point.  She was noted to have a low potassium yesterday in the emergency room.  Potassium was 2.9.  She was given IV potassium and also oral potassium.  She comes in today and she says she feels much better.  She is not having any chest pain.  She does have the right rib cage pain which is secondary to a fracture.  She also has chronic migraine headaches which have been recently aggravated.  She has chronic fatigue.  She has night sweats.  She is employed as a .  She denies any bleeding episodes, easy bruisability.  There is no family history of DVT.  There is a family history of cancer.      PAST MEDICAL HISTORY:  As above, history of asthma, migraine headaches, closed fractures of multiple ribs of right-side; chronic sinusitis, hypothyroidism, dysplasia of the cervix, adjustment disorder, depressed mood.      MEDICATIONS:   1.  Potassium chloride 20 mEq daily.   2.  Vitamin D3 one tablet daily.   3.  Augmentin 1 tablet b.i.d.   4.  Norco 1 tablet by mouth 2 times daily.   5.  Zantac 300 mg daily.   6.   Zoloft 25 mg daily.   7.  [place]-Thyroid 30 mg tablet daily.   8.  Zoloft 75 mg daily.     9.  Isovue solution 160 mg once daily.   10.  Ibuprofen 600 mg once daily.   11.  Tylenol 975 mg once daily.      ALLERGIES:  She has no known drug allergies.      SOCIAL HISTORY:  Tobacco is negative.  Alcohol is negative.  She is employed as a .  She is .      FAMILY HISTORY:  Significant for grandmother with lung cancer and stomach cancer, grandfather with unknown cancers, question lung cancer.      REVIEW OF SYSTEMS:   CENTRAL NERVOUS SYSTEM:  Significant for migraine headaches.   RESPIRATORY:  Some right chest wall pain.   CARDIAC:  Negative for chest pain, palpitations.   GASTROINTESTINAL:  Negative for constipation, diarrhea, blood per rectum.   MUSCULOSKELETAL:  Significant for right rib cage pain.   GENITOURINARY:  Negative for dysuria, urgency and frequency.   CONSTITUTIONAL:  No fevers, question night sweats, no weight loss.     ENDOCRINE:  She is menstruating.     HEMATOLOGIC:  Negative for easy bruisability, epistaxis.      PHYSICAL EXAMINATION:   GENERAL:  She is a middle-aged white female in no acute distress.   VITAL SIGNS:  Blood pressure 122/81, pulse 68, respirations 16, temperature 98.1.   HEENT:  Atraumatic, normocephalic.  Oropharynx reveals some postnasal drip.   NECK:  Supple, no thyromegaly.   LUNGS:  Clear to auscultation and percussion; chest wall reveals tenderness over the right anterolateral rib cage.   HEART:  Regular rhythm, S1, S2 normal.   ABDOMEN:  Soft with some tenderness in the right upper quadrant.   LYMPHATICS:  No cervical, supraclavicular, axillary or inguinal nodes.   EXTREMITIES:  No edema.   NEUROLOGIC:  Nonfocal.      LABORATORY DATA:  Reveal CBC from yesterday with white count 8.5, H&H 13.6 and 38.0, platelet 357.  D-dimer today is 3799.  Fibrinogen is 417.      IMPRESSION AND PLAN:  Elevated D-dimer suspect secondary to acute injury; i.e. rib fractures.   Nonetheless, would like to rule out occult malignancy and given presence of questionable cyst on liver, we would like to obtain an MRI of the abdomen.  Also she may have subclinical DIC.  We will obtain blood cultures x2.  Due to the fact her D-dimer is improving and her fibrinogen is improving, suspect that this is likely related to an acute injury.  Also, we will obtain CT neck given her history of night sweats to complete the metastatic workup.  Otherwise, given her elevated D-dimer, we would like to rule out hypercoagulable state by obtaining other labs including LDH, Factor V Leiden, prothrombin 2 mutation, Lupus panel, homocystine level, MTHFR genotype, protein CNS, antithrombin III.  Otherwise we will see the patient following the above workup.  If she develops chest pain or leg swelling she should return to clinic as soon as possible.  Otherwise, suspect this is all related to acute rib injury.      Seventy minutes was spent with the patient, greater than half of the time spent in counseling and time spent ordering labs, scans, instituting MRI of the abdomen and discussing followup care.         AMY CAICEDO MD             D: 04/10/2017 18:47   T: 2017 07:48   MT: FREDDY      Name:     DOLLY MANN   MRN:      [id number]        Account:      [id number]   :      1975           Visit Date:   04/10/2017      Document: [id number]       cc: Astrid Romero MD

## 2017-04-12 LAB
APTT PPP: 28 SEC (ref 22–37)
AT III ACT/NOR PPP CHRO: 101 % (ref 85–135)
HCYS SERPL-SCNC: 4.5 UMOL/L (ref 4–12)
INR PPP: 1.01 (ref 0.86–1.14)
THROMBIN TIME: 16.4 SEC (ref 13–19)

## 2017-04-13 LAB
EBV NA IGG SER QL IA: ABNORMAL AI (ref 0–0.8)
EBV VCA IGG SER QL IA: ABNORMAL AI (ref 0–0.8)
EBV VCA IGM SER QL IA: NORMAL AI (ref 0–0.8)
LA PPP-IMP: NORMAL
PROT C ACT/NOR PPP CHRO: 97 % (ref 70–170)

## 2017-04-14 LAB
COPATH REPORT: NORMAL
COPATH REPORT: NORMAL
PROT S FREE AG ACT/NOR PPP IA: 172 % (ref 55–125)

## 2017-04-16 LAB
BACTERIA SPEC CULT: NORMAL
BACTERIA SPEC CULT: NORMAL
MICRO REPORT STATUS: NORMAL
MICRO REPORT STATUS: NORMAL
SPECIMEN SOURCE: NORMAL
SPECIMEN SOURCE: NORMAL

## 2017-04-18 ENCOUNTER — ONCOLOGY VISIT (OUTPATIENT)
Dept: ONCOLOGY | Facility: OTHER | Age: 42
End: 2017-04-18
Attending: INTERNAL MEDICINE
Payer: COMMERCIAL

## 2017-04-18 ENCOUNTER — HOSPITAL ENCOUNTER (EMERGENCY)
Facility: HOSPITAL | Age: 42
Discharge: HOME | End: 2017-04-18
Attending: EMERGENCY MEDICINE | Admitting: EMERGENCY MEDICINE
Payer: COMMERCIAL

## 2017-04-18 VITALS
SYSTOLIC BLOOD PRESSURE: 120 MMHG | OXYGEN SATURATION: 98 % | RESPIRATION RATE: 16 BRPM | DIASTOLIC BLOOD PRESSURE: 81 MMHG | TEMPERATURE: 98.2 F

## 2017-04-18 VITALS
DIASTOLIC BLOOD PRESSURE: 76 MMHG | OXYGEN SATURATION: 100 % | BODY MASS INDEX: 24.31 KG/M2 | WEIGHT: 145.9 LBS | SYSTOLIC BLOOD PRESSURE: 119 MMHG | HEART RATE: 82 BPM | TEMPERATURE: 98 F | HEIGHT: 65 IN | RESPIRATION RATE: 16 BRPM

## 2017-04-18 DIAGNOSIS — R19.7 DIARRHEA, UNSPECIFIED TYPE: ICD-10-CM

## 2017-04-18 DIAGNOSIS — R42 DISEQUILIBRIUM: ICD-10-CM

## 2017-04-18 DIAGNOSIS — R51.9 DAILY HEADACHE: ICD-10-CM

## 2017-04-18 DIAGNOSIS — R79.89 ELEVATED D-DIMER: ICD-10-CM

## 2017-04-18 DIAGNOSIS — R79.89 ELEVATED D-DIMER: Primary | ICD-10-CM

## 2017-04-18 DIAGNOSIS — R10.84 ABDOMINAL PAIN, GENERALIZED: ICD-10-CM

## 2017-04-18 LAB
ALBUMIN SERPL-MCNC: 4 G/DL (ref 3.4–5)
ALP SERPL-CCNC: 60 U/L (ref 40–150)
ALT SERPL W P-5'-P-CCNC: 20 U/L (ref 0–50)
ANION GAP SERPL CALCULATED.3IONS-SCNC: 8 MMOL/L (ref 3–14)
AST SERPL W P-5'-P-CCNC: 16 U/L (ref 0–45)
BASOPHILS # BLD AUTO: 0.1 10E9/L (ref 0–0.2)
BASOPHILS NFR BLD AUTO: 0.8 %
BILIRUB SERPL-MCNC: 0.3 MG/DL (ref 0.2–1.3)
BUN SERPL-MCNC: 7 MG/DL (ref 7–30)
CALCIUM SERPL-MCNC: 8.4 MG/DL (ref 8.5–10.1)
CHLORIDE SERPL-SCNC: 103 MMOL/L (ref 94–109)
CO2 SERPL-SCNC: 29 MMOL/L (ref 20–32)
CREAT SERPL-MCNC: 0.81 MG/DL (ref 0.52–1.04)
D DIMER PPP DDU-MCNC: >5250 NG/ML D-DU (ref 0–300)
DIFFERENTIAL METHOD BLD: NORMAL
EOSINOPHIL # BLD AUTO: 0 10E9/L (ref 0–0.7)
EOSINOPHIL NFR BLD AUTO: 0 %
ERYTHROCYTE [DISTWIDTH] IN BLOOD BY AUTOMATED COUNT: 11.9 % (ref 10–15)
FIBRINOGEN PPP-MCNC: 379 MG/DL (ref 200–420)
GFR SERPL CREATININE-BSD FRML MDRD: 78 ML/MIN/1.7M2
GLUCOSE SERPL-MCNC: 96 MG/DL (ref 70–99)
HCT VFR BLD AUTO: 37.3 % (ref 35–47)
HGB BLD-MCNC: 12.9 G/DL (ref 11.7–15.7)
IMM GRANULOCYTES # BLD: 0 10E9/L (ref 0–0.4)
IMM GRANULOCYTES NFR BLD: 0.2 %
LDH SERPL L TO P-CCNC: 144 U/L (ref 81–234)
LYMPHOCYTES # BLD AUTO: 1.9 10E9/L (ref 0.8–5.3)
LYMPHOCYTES NFR BLD AUTO: 29.4 %
MCH RBC QN AUTO: 30 PG (ref 26.5–33)
MCHC RBC AUTO-ENTMCNC: 34.6 G/DL (ref 31.5–36.5)
MCV RBC AUTO: 87 FL (ref 78–100)
MONOCYTES # BLD AUTO: 0.5 10E9/L (ref 0–1.3)
MONOCYTES NFR BLD AUTO: 7.6 %
NEUTROPHILS # BLD AUTO: 3.9 10E9/L (ref 1.6–8.3)
NEUTROPHILS NFR BLD AUTO: 62 %
NRBC # BLD AUTO: 0 10*3/UL
NRBC BLD AUTO-RTO: 0 /100
PLATELET # BLD AUTO: 321 10E9/L (ref 150–450)
POTASSIUM SERPL-SCNC: 3.7 MMOL/L (ref 3.4–5.3)
PROT SERPL-MCNC: 7.7 G/DL (ref 6.8–8.8)
RBC # BLD AUTO: 4.3 10E12/L (ref 3.8–5.2)
SODIUM SERPL-SCNC: 140 MMOL/L (ref 133–144)
TROPONIN I SERPL-MCNC: NORMAL UG/L (ref 0–0.04)
WBC # BLD AUTO: 6.4 10E9/L (ref 4–11)

## 2017-04-18 PROCEDURE — 93970 EXTREMITY STUDY: CPT | Mod: TC

## 2017-04-18 PROCEDURE — 85025 COMPLETE CBC W/AUTO DIFF WBC: CPT | Performed by: INTERNAL MEDICINE

## 2017-04-18 PROCEDURE — 80053 COMPREHEN METABOLIC PANEL: CPT | Performed by: INTERNAL MEDICINE

## 2017-04-18 PROCEDURE — 99285 EMERGENCY DEPT VISIT HI MDM: CPT | Performed by: EMERGENCY MEDICINE

## 2017-04-18 PROCEDURE — 36415 COLL VENOUS BLD VENIPUNCTURE: CPT | Performed by: INTERNAL MEDICINE

## 2017-04-18 PROCEDURE — 84484 ASSAY OF TROPONIN QUANT: CPT | Performed by: EMERGENCY MEDICINE

## 2017-04-18 PROCEDURE — 85384 FIBRINOGEN ACTIVITY: CPT | Performed by: INTERNAL MEDICINE

## 2017-04-18 PROCEDURE — 99215 OFFICE O/P EST HI 40 MIN: CPT | Performed by: INTERNAL MEDICINE

## 2017-04-18 PROCEDURE — 83615 LACTATE (LD) (LDH) ENZYME: CPT | Performed by: INTERNAL MEDICINE

## 2017-04-18 PROCEDURE — 93005 ELECTROCARDIOGRAM TRACING: CPT

## 2017-04-18 PROCEDURE — 85379 FIBRIN DEGRADATION QUANT: CPT | Performed by: INTERNAL MEDICINE

## 2017-04-18 PROCEDURE — 99285 EMERGENCY DEPT VISIT HI MDM: CPT | Mod: 25

## 2017-04-18 ASSESSMENT — ENCOUNTER SYMPTOMS
VOMITING: 0
COUGH: 0
EYES NEGATIVE: 1
MYALGIAS: 1
ABDOMINAL PAIN: 1
SPEECH DIFFICULTY: 0
FACIAL ASYMMETRY: 0
ALLERGIC/IMMUNOLOGIC NEGATIVE: 1
SEIZURES: 0
TREMORS: 0
HEADACHES: 1
DIZZINESS: 1
NAUSEA: 0
WHEEZING: 0
FATIGUE: 1
CONSTIPATION: 0
DIARRHEA: 0
CONFUSION: 0
WEAKNESS: 1
UNEXPECTED WEIGHT CHANGE: 0
CHEST TIGHTNESS: 1
FEVER: 0
CHILLS: 0
NUMBNESS: 0
PALPITATIONS: 0
DYSPHORIC MOOD: 0
SHORTNESS OF BREATH: 0
VOICE CHANGE: 0
LIGHT-HEADEDNESS: 1
RHINORRHEA: 0
ACTIVITY CHANGE: 1
NERVOUS/ANXIOUS: 1
JOINT SWELLING: 0
CHOKING: 0
APPETITE CHANGE: 0
DIAPHORESIS: 0
SORE THROAT: 0

## 2017-04-18 ASSESSMENT — PAIN SCALES - GENERAL: PAINLEVEL: MILD PAIN (2)

## 2017-04-18 NOTE — MR AVS SNAPSHOT
After Visit Summary   4/18/2017    Deanne Yee    MRN: [id number]           Patient Information     Date Of Birth          1975        Visit Information        Provider Department      4/18/2017 3:30 PM Norberto Jaramillo MD [facility]        Today's Diagnoses     Elevated d-dimer    -  1    Abdominal pain, generalized        Diarrhea, unspecified type          Care Instructions    We would like to see you back in one week after your EDG/Colonscopy(to be scheduled with  at [facility]). We will call you with your appointments.When you are in need of a refill of your medications, please call your pharmacy and they will send us the request. If you have any questions please call [phone]        Follow-ups after your visit        Your next 10 appointments already scheduled     Apr 19, 2017 12:00 PM CDT   Radiology with [facility] NUC MED   [facility] Nuclear Medicine ([facility] )    [address] [place] [id number] [phone]            May 03, 2017  8:00 AM CDT   Radiology with [facility] ECHO RM   [facility] Ultrasound ([facility] )    [address] [place] [id number] [phone]            May 03, 2017  9:00 AM CDT   Radiology with [facility] NUC MED   [facility] Nuclear Medicine ([facility] )    [address] [place] [id number] [phone]              Who to contact     If you have questions or need follow up information about today's clinic visit or your schedule please contact [facility] directly at [phone].  Normal or non-critical lab and imaging results will be communicated to you by MyChart, letter or phone within 4 business days after the clinic has received the results. If you do not hear from us within 7 days, please contact the clinic through MyChart or phone. If you have a critical or abnormal lab result, we will notify you by phone as soon as possible.  Submit refill requests through [employer] or call your  "pharmacy and they will forward the refill request to us. Please allow 3 business days for your refill to be completed.          Additional Information About Your Visit        MyChart Information     [employer] lets you send messages to your doctor, view your test results, renew your prescriptions, schedule appointments and more. To sign up, go to [email or web address] . Click on \"Log in\" on the left side of the screen, which will take you to the Welcome page. Then click on \"Sign up Now\" on the right side of the page.     You will be asked to enter the access code listed below, as well as some personal information. Please follow the directions to create your username and password.     Your access code is: [email or web address]  Expires: 2017  4:28 PM     Your access code will  in 90 days. If you need help or a new code, please call your [place] clinic or [phone].        Care EveryWhere ID     This is your Care EveryWhere ID. This could be used by other organizations to access your [place] medical records  [id number]        Your Vitals Were     Pulse Temperature Respirations Height Pulse Oximetry BMI (Body Mass Index)    82 98  F (36.7  C) (Tympanic) 16 1.651 m (5' 5\") 100% 24.28 kg/m2       Blood Pressure from Last 3 Encounters:   17 119/76   04/10/17 119/75   17 126/83    Weight from Last 3 Encounters:   17 66.2 kg (145 lb 14.4 oz)   04/10/17 67.1 kg (148 lb)   17 64.4 kg (142 lb)              Today, you had the following     No orders found for display       Primary Care Provider Office Phone # Fax #    Astrid Romero [phone]       [facility] [place] [address]  [place] [address]        Thank you!     [facility] you for [facility]  [facility] your care. Our goal is always to provide you with excellent care. Hearing back from our patients is one way we can continue to improve our services. Please take a few minutes to complete the written " survey that you may receive in the mail after your visit with us. Thank you!             Your Updated Medication List - Protect others around you: Learn how to safely use, store and throw away your medicines at www.disposemymeds.org.          This list is accurate as of: 4/18/17  4:26 PM.  Always use your most recent med list.                   Brand Name Dispense Instructions for use    amoxicillin-clavulanate 875-125 MG per tablet    AUGMENTIN    28 tablet    Take 1 tablet by mouth 2 times daily       ARMOUR THYROID 30 MG tablet   Generic drug:  thyroid     90 tablet    TAKE 1 TABLET BY MOUTH EVERY DAY       CHILDRENS MULTIVITAMIN PO      Take 2 tablets by mouth daily       HYDROcodone-acetaminophen 5-325 MG per tablet    NORCO    20 tablet    Take 1 tablet by mouth 2 times daily as needed for moderate to severe pain       potassium chloride SA 20 MEQ CR tablet    potassium chloride    20 tablet    Take 1 tablet (20 mEq) by mouth 2 times daily for 10 days       ranitidine 300 MG tablet    ZANTAC    30 tablet    Take 1 tablet (300 mg) by mouth At Bedtime       * sertraline 50 MG tablet    ZOLOFT    135 tablet    Take 1.5 tablets (75 mg) by mouth daily       * sertraline 25 MG tablet    ZOLOFT    90 tablet    TAKE 1 TABLET BY MOUTH EVERY DAY ALONG WITH THE 50MG TABLET       VITAMIN D3 PO      Take 1 tablet by mouth daily       * Notice:  This list has 2 medication(s) that are the same as other medications prescribed for you. Read the directions carefully, and ask your doctor or other care provider to review them with you.

## 2017-04-18 NOTE — PATIENT INSTRUCTIONS
We would like to see you back in one week after your EDG/Colonscopy(to be scheduled with  at [facility]). We will call you with your appointments.When you are in need of a refill of your medications, please call your pharmacy and they will send us the request. If you have any questions please call [phone]

## 2017-04-18 NOTE — ED AVS SNAPSHOT
[facility]    [address] [address]    Phone:  [phone]                                       Deanne Yee   MRN: [id number]    Department:  [facility]   Date of Visit:  4/18/2017           Patient Information     Date Of Birth          1975        Your diagnoses for this visit were:     Elevated d-dimer     Disequilibrium     Daily headache        You were seen by Jah Green MD.      Follow-up Information     Follow up with Astrid Romero MD In 1 week.    Specialty:  Family Practice    Why:  recheck current symptoms    Contact information:    [facility] [place]  [address]  [facility] [address] [phone]          Follow up with echocardiogram as arranged.        Follow up with [facility] Emergency [facility].    Specialty:  EMERGENCY MEDICINE    Why:  As needed, If symptoms worsen    Contact information:    [address] [address] [phone]    Additional information:    From [facility]: Take [address]. Turn left at [address]. Turn left at the first stoplight on [address]. At the first stop sign, take a right onto [address]. Take a left into the parking lot and continue through until you reach the North enterance of the building.       From [place]: Take [address]. Take the [address] ramp towards [place]. Turn left onto [address]. Take a slight right onto [address]. Turn left at the first stoplight on [address]. At the first stop sign, take a right onto [address]. Take a left into the parking lot and continue through until you reach the North enterance of the building.       From [place]: Take [address]. Take a right at [address]. At the first stop sign, take a right onto [address]. Take a left into the parking lot and continue through until you reach the North enterance of the building.         Discharge  Instructions         Dizziness (Vertigo) and Balance Problems: Ensuring Your Safety  Falls or accidents can lead to pain, broken bones, and fear of future falls. Protect yourself and others by preparing for episodes. Simple steps can help increase your safety at home and wherever you go.  Lighting    Keep all areas well lit. This helps your eyes send the right signals to the brain. It also makes you less likely to trip and fall. If bright lights make symptoms worse, dim the lights or lie in a dark room until the dizziness passes. Then turn the lights back to their normal level.  Tips:    Keep a flashlight by the bed.    Place nightlights in bathrooms and hallways.    Replace burned-out bulbs, or have someone replace them for you.  Fall prevention  To reduce your risk of falling:    Rise out of a bed or chair slowly.    Wear low-heeled shoes that fit properly and have slip-resistant soles.    Remove throw rugs. Clear clutter from walkways.    Use handrails on stairs. Have handrails installed or adjusted if needed.    Install grab bars in the bathroom. Don't use towel racks for balance.    Use a shower stool. Also, apply adhesive strips to the shower or tub floor.  Going out  With a little time and preparation, you can get around safely.  Tips:    Bring a cane or walking aid if needed.    Give yourself plenty of time in case a dizziness episode begins.    Be patient. If an activity like walking through a crowded shop causes you stress, you may not be ready for it yet.  Driving  If you become dizzy or disoriented while driving, you could hurt yourself and others. That's why it's best to avoid driving until symptoms subside. In some cases, your license may be temporarily held until it's safe for you to drive again.  For safety:    Ask a friend to drive for you.    Take public transportation.    Walk to stores and other places when you can.  Asking for help  Don't be afraid to ask for help running errands, cooking meals,  and doing exercise. Whether it's a friend, loved one, neighbor, or stranger on the street, a little help can make a world of difference.     9875-5754 [employer]. [address] [place] [address], [place] [address]. All rights reserved. This information is not intended as a substitute for professional medical care. Always follow your healthcare professional's instructions.          Possible Causes of Dizziness or Fainting  Dizziness and fainting can have many causes. Below are some examples of possible causes your health care provider will look to rule out.    Benign Paroxysmal Positional Vertigo (BPPV)  BPPV results when calcium crystals inside the inner ear shift into the wrong position. BPPV causes episodes of vertigo (a spinning sensation). Episodes most often occur when the head is moved in a certain way.  Infection or Inflammation  The semicircular canals of the ear may become infected or inflamed. In this case, they can send the wrong balance signals. This can cause vertigo.  Meniere s Disease  Meniere s disease happens when there is too much fluid in the semicircular canals. This can cause vertigo. It also can cause hearing problems and buzzing or ringing in the ears (called tinnitus). You may also have a feeling of pressure or fullness in the ear.  Syncope  Syncope is fainting that occurs when the brain doesn t get enough oxygen-rich blood. It can be caused by low heart rate or low blood pressure. This is called vasovagal syncope. It can also be caused by sitting or standing up too quickly. This is called orthostatic hypotension. Syncope may also be due to a heart valve problem, an abnormal heart rhythm, or other heart problems. Dizziness can also occur from stroke, hemorrhage in the brain, or other problems in the brain. Your doctor may do certain tests to rule out these conditions.  Other Causes  Other causes include:    Medications. Certain medications can cause dizziness and even fainting. In some  cases, stopping a medication too quickly can lead to withdrawal symptoms, including dizziness and fainting.    Anxiety. Being anxious can lead to breathing changes, such as hyperventilation. These can lead to dizziness and fainting.  Additional causes for dizziness and fainting also exist. Talk to your doctor for more information.          0856-4433 [employer]. [address] [place] [address], [place] [address]. All rights reserved. This information is not intended as a substitute for professional medical care. Always follow your healthcare professional's instructions.          Future Appointments        Provider Department Dept Phone Center    4/19/2017 12:00 PM HI Nuc Med [facility] Nuclear Medicine [phone] [facility]    5/3/2017 8:00 AM HI Ultrasound 3 HI Ultrasound [phone] [facility]    5/3/2017 9:00 AM HI Nuc [facility] Nuclear Medicine [phone] [facility]         Review of your medicines      Our records show that you are taking the medicines listed below. If these are incorrect, please call your family doctor or clinic.        Dose / Directions Last dose taken    amoxicillin-clavulanate 875-125 MG per tablet   Commonly known as:  AUGMENTIN   Dose:  1 tablet   Quantity:  28 tablet        Take 1 tablet by mouth 2 times daily   Refills:  0        ARMOUR THYROID 30 MG tablet   Quantity:  90 tablet   Generic drug:  thyroid        TAKE 1 TABLET BY MOUTH EVERY DAY   Refills:  0        CHILDRENS MULTIVITAMIN PO   Dose:  2 tablet        Take 2 tablets by mouth daily   Refills:  0        HYDROcodone-acetaminophen 5-325 MG per tablet   Commonly known as:  NORCO   Dose:  1 tablet   Quantity:  20 tablet        Take 1 tablet by mouth 2 times daily as needed for moderate to severe pain   Refills:  0        potassium chloride SA 20 MEQ CR tablet   Commonly known as:  potassium chloride   Dose:  20 mEq   Quantity:  20 tablet        Take 1 tablet (20 mEq) by mouth 2 times daily for 10 days   Refills:  0         "ranitidine 300 MG tablet   Commonly known as:  ZANTAC   Dose:  300 mg   Quantity:  30 tablet        Take 1 tablet (300 mg) by mouth At Bedtime   Refills:  1        * sertraline 50 MG tablet   Commonly known as:  ZOLOFT   Dose:  75 mg   Quantity:  135 tablet        Take 1.5 tablets (75 mg) by mouth daily   Refills:  1        * sertraline 25 MG tablet   Commonly known as:  ZOLOFT   Quantity:  90 tablet        TAKE 1 TABLET BY MOUTH EVERY DAY ALONG WITH THE 50MG TABLET   Refills:  1        VITAMIN D3 PO   Dose:  1 tablet        Take 1 tablet by mouth daily   Refills:  0        * Notice:  This list has 2 medication(s) that are the same as other medications prescribed for you. Read the directions carefully, and ask your doctor or other care provider to review them with you.            Procedures and tests performed during your visit     Troponin I    US Lower Extremity Venous Duplex Bilateral      Orders Needing Specimen Collection     None      Pending Results     Date and Time Order Name Status Description    4/18/2017 2047  Lower Extremity Venous Duplex Bilateral In process             Pending Culture Results     No orders found from 4/16/2017 to 4/19/2017.            Thank you for choosing [facility]       Thank you for choosing [facility] for your care. Our goal is always to provide you with excellent care. Hearing back from our patients is one way we can continue to improve our services. Please take a few minutes to complete the written survey that you may receive in the mail after you visit with us. Thank you!        [employer] Information     [employer] lets you send messages to your doctor, view your test results, renew your prescriptions, schedule appointments and more. To sign up, go to [email or web address] . Click on \"Log in\" on the left side of the screen, which will take you to the Welcome page. Then click on \"Sign up Now\" on the right side of the page.     You will be asked to enter the access code listed " below, as well as some personal information. Please follow the directions to create your username and password.     Your access code is: [email or web address]  Expires: 2017  4:28 PM     Your access code will  in 90 days. If you need help or a new code, please call your [place] clinic or [phone].        Care EveryWhere ID     This is your Care EveryWhere ID. This could be used by other organizations to access your [place] medical records  [id number]        After Visit Summary       This is your record. Keep this with you and show to your community pharmacist(s) and doctor(s) at your next visit.

## 2017-04-18 NOTE — NURSING NOTE
"Chief Complaint   Patient presents with     RECHECK     FOllow up        Initial /76 (BP Location: Left arm, Patient Position: Chair, Cuff Size: Adult Regular)  Pulse 82  Temp 98  F (36.7  C) (Tympanic)  Resp 16  Ht 1.651 m (5' 5\")  Wt 66.2 kg (145 lb 14.4 oz)  SpO2 100%  BMI 24.28 kg/m2 Estimated body mass index is 24.28 kg/(m^2) as calculated from the following:    Height as of this encounter: 1.651 m (5' 5\").    Weight as of this encounter: 66.2 kg (145 lb 14.4 oz).  Medication Reconciliation: complete     Queta Vaz      Patient was assessed using the NCCN psychosocial distress thermometer. Patient rated the score as a 8. Patient rated current stressors as extended family issues. Stressors will be brought to the attention of provider or Oncology RN Care Coordinator for a score of 6 or greater or per nurses discretion.           "

## 2017-04-18 NOTE — ED AVS SNAPSHOT
[facility]    [address] [address]    Phone:  [phone]                                       Deanne Yee   MRN: [id number]    Department:  [facility]   Date of Visit:  4/18/2017           After Visit Summary Signature Page     I have received my discharge instructions, and my questions have been answered. I have discussed any challenges I see with this plan with the nurse or doctor.    ..........................................................................................................................................  Patient/Patient Representative Signature      ..........................................................................................................................................  Patient Representative Print Name and Relationship to Patient    ..................................................               ................................................  Date                                            Time    ..........................................................................................................................................  Reviewed by Signature/Title    ...................................................              ..............................................  Date                                                            Time

## 2017-04-19 ENCOUNTER — HOSPITAL ENCOUNTER (OUTPATIENT)
Dept: NUCLEAR MEDICINE | Facility: HOSPITAL | Age: 42
Discharge: HOME | End: 2017-04-19
Attending: FAMILY MEDICINE | Admitting: FAMILY MEDICINE
Payer: COMMERCIAL

## 2017-04-19 ENCOUNTER — TELEPHONE (OUTPATIENT)
Dept: NUCLEAR MEDICINE | Facility: HOSPITAL | Age: 42
End: 2017-04-19

## 2017-04-19 ENCOUNTER — TELEPHONE (OUTPATIENT)
Dept: ONCOLOGY | Facility: OTHER | Age: 42
End: 2017-04-19

## 2017-04-19 DIAGNOSIS — R10.11 ABDOMINAL PAIN, RIGHT UPPER QUADRANT: Primary | ICD-10-CM

## 2017-04-19 PROCEDURE — 78227 HEPATOBIL SYST IMAGE W/DRUG: CPT | Mod: TC

## 2017-04-19 PROCEDURE — A9537 TC99M MEBROFENIN: HCPCS | Mod: TC

## 2017-04-19 RX ORDER — KIT FOR THE PREPARATION OF TECHNETIUM TC 99M MEBROFENIN 45 MG/10ML
5 INJECTION, POWDER, LYOPHILIZED, FOR SOLUTION INTRAVENOUS ONCE
Status: COMPLETED | OUTPATIENT
Start: 2017-04-19 | End: 2017-04-19

## 2017-04-19 RX ADMIN — KIT FOR THE PREPARATION OF TECHNETIUM TC 99M MEBROFENIN 5 MILLICURIE: 45 INJECTION, POWDER, LYOPHILIZED, FOR SOLUTION INTRAVENOUS at 12:30

## 2017-04-19 ASSESSMENT — PATIENT HEALTH QUESTIONNAIRE - PHQ9: SUM OF ALL RESPONSES TO PHQ QUESTIONS 1-9: 9

## 2017-04-19 NOTE — DISCHARGE INSTRUCTIONS
Dizziness (Vertigo) and Balance Problems: Ensuring Your Safety  Falls or accidents can lead to pain, broken bones, and fear of future falls. Protect yourself and others by preparing for episodes. Simple steps can help increase your safety at home and wherever you go.  Lighting    Keep all areas well lit. This helps your eyes send the right signals to the brain. It also makes you less likely to trip and fall. If bright lights make symptoms worse, dim the lights or lie in a dark room until the dizziness passes. Then turn the lights back to their normal level.  Tips:    Keep a flashlight by the bed.    Place nightlights in bathrooms and hallways.    Replace burned-out bulbs, or have someone replace them for you.  Fall prevention  To reduce your risk of falling:    Rise out of a bed or chair slowly.    Wear low-heeled shoes that fit properly and have slip-resistant soles.    Remove throw rugs. Clear clutter from walkways.    Use handrails on stairs. Have handrails installed or adjusted if needed.    Install grab bars in the bathroom. Don't use towel racks for balance.    Use a shower stool. Also, apply adhesive strips to the shower or tub floor.  Going out  With a little time and preparation, you can get around safely.  Tips:    Bring a cane or walking aid if needed.    Give yourself plenty of time in case a dizziness episode begins.    Be patient. If an activity like walking through a crowded shop causes you stress, you may not be ready for it yet.  Driving  If you become dizzy or disoriented while driving, you could hurt yourself and others. That's why it's best to avoid driving until symptoms subside. In some cases, your license may be temporarily held until it's safe for you to drive again.  For safety:    Ask a friend to drive for you.    Take public transportation.    Walk to stores and other places when you can.  Asking for help  Don't be afraid to ask for help running errands, cooking meals, and doing  exercise. Whether it's a friend, loved one, neighbor, or stranger on the street, a little help can make a world of difference.     3895-8845 [employer]. [address] [place] [address], [place] [address]. All rights reserved. This information is not intended as a substitute for professional medical care. Always follow your healthcare professional's instructions.          Possible Causes of Dizziness or Fainting  Dizziness and fainting can have many causes. Below are some examples of possible causes your health care provider will look to rule out.    Benign Paroxysmal Positional Vertigo (BPPV)  BPPV results when calcium crystals inside the inner ear shift into the wrong position. BPPV causes episodes of vertigo (a spinning sensation). Episodes most often occur when the head is moved in a certain way.  Infection or Inflammation  The semicircular canals of the ear may become infected or inflamed. In this case, they can send the wrong balance signals. This can cause vertigo.  Meniere s Disease  Meniere s disease happens when there is too much fluid in the semicircular canals. This can cause vertigo. It also can cause hearing problems and buzzing or ringing in the ears (called tinnitus). You may also have a feeling of pressure or fullness in the ear.  Syncope  Syncope is fainting that occurs when the brain doesn t get enough oxygen-rich blood. It can be caused by low heart rate or low blood pressure. This is called vasovagal syncope. It can also be caused by sitting or standing up too quickly. This is called orthostatic hypotension. Syncope may also be due to a heart valve problem, an abnormal heart rhythm, or other heart problems. Dizziness can also occur from stroke, hemorrhage in the brain, or other problems in the brain. Your doctor may do certain tests to rule out these conditions.  Other Causes  Other causes include:    Medications. Certain medications can cause dizziness and even fainting. In some cases,  stopping a medication too quickly can lead to withdrawal symptoms, including dizziness and fainting.    Anxiety. Being anxious can lead to breathing changes, such as hyperventilation. These can lead to dizziness and fainting.  Additional causes for dizziness and fainting also exist. Talk to your doctor for more information.          5534-4899 [employer]. [address] [place] [address], [place] [address]. All rights reserved. This information is not intended as a substitute for professional medical care. Always follow your healthcare professional's instructions.

## 2017-04-19 NOTE — ED NOTES
Pt here for abnormal labs-was called per Dr Ness to come in.  States D dimer 5200. Hx broken ribs. Also hx of low K.  Chest tightness 1/10, no SOB

## 2017-04-19 NOTE — ED PROVIDER NOTES
"  History     Chief Complaint   Patient presents with     Abnormal Labs     notes told to come for for abd labs. c/o \"a bit of a h/a\". upper abd pain     Chest Pain     c/o \" a little bit of chest tightness\"     [facility] Comments: 20.23  Here with her     Chief complaint  Sent from hematology office because of chest pain and elevated d-dimer    History of present illness  41-year-old female , mother of one child 369 days ago, since then and during pregnancy developed a number of symptoms and problems.  She did have preeclampsia.  In the last few months she's been dealing with chest pain discomfort daily headaches or lightheadedness/loss of balance when she stands upper abdominal pain altered taste upset stomach with food.  She is very close to her prepregnancy weight.  She had developed pneumonia in February and subsequent to that a very bad cough which resulted in 2 or 3 broken ribs on the right side of her chest.    To evaluation of her chest pain she was found to have an elevated d-dimer, over 5000.  She underwent CT angiogram which was negative.  A subsequent visit she had persisting elevation and underwent a CT angiogram of the chest and then of the abdomen and pelvis because of persisting elevated d-dimer.  This was done here in the emergency.  These are both negative.    Consequently referred to hematology, d-dimer elevated day again over 5000.  Previously altered fibrinogen has been normalized her bleeding times her normal her blood count including white count hemoglobin are normal, previously low potassium has normalized.    Her chest discomfort currently is very minimal, does not limit her activities, does not keep her awake.  No cough or shortness of breath.  She still has some pain on the right side where her ribs were broken.    Her last missed her period just ended.  With deep breath or with position her chest pain does increase.  She's altered her eating habits and eat small meals more " frequently.  Has not lost any additional weight.  She has returned to work    The history is provided by the patient and medical records.     Past Medical History:   Diagnosis Date     Cervical dysplasia 01/12/2012     MATHEW (generalized anxiety disorder)     started zoloft while pregnant 2016     GERD (gastroesophageal reflux disease)     while pregnant     Hx of migraine headaches 01/12/2012     Hypothyroidism      Preeclampsia     post partum     Reactive airway disease 01/12/2012     S/P LEEP (loop electrosurgical excision procedure) 01/12/2012     Sleep Pattern Disturbance 01/25/2013     Past Surgical History:   Procedure Laterality Date     DENTAL SURGERY      wisdom teeth removal     Loop electrosurgical excision procedure  2006      No Known Allergies  No current facility-administered medications for this encounter.      Current Outpatient Prescriptions   Medication     potassium chloride SA (POTASSIUM CHLORIDE) 20 MEQ CR tablet     Cholecalciferol (VITAMIN D3 PO)     amoxicillin-clavulanate (AUGMENTIN) 875-125 MG per tablet     ranitidine (ZANTAC) 300 MG tablet     sertraline (ZOLOFT) 25 MG tablet     ARMOUR THYROID 30 MG tablet     sertraline (ZOLOFT) 50 MG tablet     Pediatric Multivit-Minerals-C (CHILDRENS MULTIVITAMIN PO)     HYDROcodone-acetaminophen (NORCO) 5-325 MG per tablet         Review of Systems   Constitutional: Positive for activity change and fatigue. Negative for appetite change, chills, diaphoresis, fever and unexpected weight change.   HENT: Negative for congestion, ear pain, rhinorrhea, sore throat and voice change.    Eyes: Negative.    Respiratory: Positive for chest tightness. Negative for cough, choking, shortness of breath and wheezing.    Cardiovascular: Positive for chest pain (rt side from ribs). Negative for palpitations and leg swelling.   Gastrointestinal: Positive for abdominal pain (mild epigastric/upset stomach with eating). Negative for constipation, diarrhea, nausea and  vomiting.   Genitourinary: Negative.         LMP just finished, normal   Musculoskeletal: Positive for myalgias. Negative for gait problem and joint swelling.   Allergic/Immunologic: Negative.    Neurological: Positive for dizziness (meaning odd balance daily when stands, occ spinning, improves with time), weakness, light-headedness (occ) and headaches (daily). Negative for tremors, seizures, syncope, facial asymmetry, speech difficulty and numbness.   Psychiatric/Behavioral: Negative for confusion and dysphoric mood. The patient is nervous/anxious (many stressors).        Physical Exam   BP: 146/89  Heart Rate: 77  Temp: 97.5  F (36.4  C)  Resp: 18  SpO2: 100 %  Physical Exam   Constitutional: She is oriented to person, place, and time. She appears well-developed and well-nourished. No distress.   Appears well, a little anxious, VS normal  No hypoxia  No diff speaking or breathing   HENT:   Head: Normocephalic.   Mouth/Throat: Oropharynx is clear and moist.   Eyes: Conjunctivae are normal. Pupils are equal, round, and reactive to light.   Neck: Neck supple.   Cardiovascular: Normal rate, regular rhythm and intact distal pulses.    Pulmonary/Chest: Effort normal and breath sounds normal. No respiratory distress. She has no wheezes. She has no rales. She exhibits tenderness (mild right side).   Abdominal: Soft. Bowel sounds are normal. She exhibits no distension and no mass. There is no tenderness. There is no rebound and no guarding.   Musculoskeletal: She exhibits no edema.   Lymphadenopathy:     She has no cervical adenopathy.   Neurological: She is alert and oriented to person, place, and time. She exhibits normal muscle tone.   Skin: Skin is warm and dry. No rash noted. She is not diaphoretic. No pallor.   Psychiatric: Her behavior is normal.   Nursing note and vitals reviewed.      ED Course     ED Course     Procedures        Exam  Troponin  ekg normal  Future echocardiogram  Doppler both legs  CT angiogram  chest not indicated as has been done and no change in sx; risk not justified even if doppler positive         Labs Ordered and Resulted from Time of ED Arrival Up to the Time of Departure from the ED   TROPONIN I       Assessments & Plan (with Medical Decision Making)     41 yr female with several symptoms analisa rt sided-chest pain, recent rib fractures from cough due to pneumonia, tiredness, off-balance and occ vertigo, daily headaches.  Notably elevated d-dimer, previous low fibrinogen and potassium although these latter two results have normalized.  Brain normal on MRI, positive sinus fluid  CT angiogram chest (twice) and abdomen/pelvis all negative  Referred to ED from oncology today  Troponin normal  Doppler of both legs negative for     I have reviewed the nursing notes.    I have reviewed the findings, diagnosis, plan and need for follow up with the patient.    Discharge Medication List as of 4/18/2017  9:53 PM          Final diagnoses:   Elevated d-dimer   Disequilibrium   Daily headache     Followup with echocardiogram as arranged  Followup one week with primary care provider for review of symptoms  No indication of emergency condition needing treatment tonight  CT pulmonary angiogram not indicated  See discharge instructions    4/18/2017   [facility]     Jah Green MD  04/18/17 [id number]

## 2017-04-19 NOTE — ED NOTES
Instructions to pt, resp non labored, very slight dizziness,,steady on feet.  Minimal chest tightness. Alert and oriented.  Encouraged to follow up if worse.

## 2017-04-19 NOTE — PROCEDURES
Pt drank 16 oz of chocolate boost, 28 grams of fat, orally for NM HIDA Scan for gallbladder ejection fraction at 13:10 on 4/19/17. No complications.

## 2017-04-19 NOTE — PROGRESS NOTES
HISTORY OF PRESENT ILLNESS:  Mr. Deanne Yee returns for followup of elevated D-dimer history.  We had seen her in consultation at the request of Dr. Romero on 04/10/2017.  At that time she had a history of asthma, migraine headaches, pneumonia and had recently been seeing Dr. Astrid Romero on 10/22/2016 with complaints of cough and right rib cage pain.  Chest x-ray was obtained and revealed evidence of pneumonia and a right lower lobe infiltrate.  She was treated empirically with a Z-CASSIDY and subsequently she seen in the emergency room on 03/29 with complaints of sinus symptoms, a cough which had not gone away, dizziness to the point where she had to pull over in her car; she felt like she was going to faint.    She was seen by HOLLY Tarango who  ordered a chest x-ray initially which revealed a granuloma in the right lower lobe and a D-dimer which came back extremely elevated at 4100.  He ordered a CT of the chest which revealed no evidence of pulmonary embolus.  There were healing fractures involving the anterior margins of the right 6th and 7th ribs which could account for the right-sided chest pain.  There was a calcified granuloma in the right lower lobe and a 13 mm cyst in the left lobe of the liver.  Subsequent followup with Dr. Romero was done on 04/06/2017.  At that time a D-dimer was greater than 5100 and she performed lab work including a serum protein electrophoresis, which was negative, CRP which was slightly elevated, sed rate was normal, LDH was normal.  Liver function was essentially negative.  There were no schistocytes noted.  EKG was normal.  She did have complaints of abdominal pain.  A right upper quadrant ultrasound of the abdomen was essentially negative.  This was repeated again on 04/07 and it was negative.  She was subsequently seen in the emergency room subsequently on 04/09/2017 with complaints of near-syncope, shortness of breath, chest heaviness and numbness and tingling  sensation.  She was seen by nurse practitioner, Elizabet Hanks.  EKG was normal sinus rhythm.  Workup included a CT of the chest revealed no evidence of pulmonary embolus, probable cyst in left lobe of the liver.  She had an MRI of the brain which reveals nonspecific findings.  There was extensive mucosal thickening in both the left maxillary and ethmoid right frontal and right sphenoid sinuses.  She also had bilateral venous Dopplers on 04/07 which were negative.  Repeat D-dimer was drawn on 04/09 and had improved to 3936.  Fibrinogen had been ordered which was slightly elevated at 433.  B12 was also ordered and this was negative.  Beta hCG urine test was negative.  The patient was menstruating when we saw her on 04/10.  They were not using birth control at this point.  She was noted to have a low potassium in the emergency room on 4/09, potassium was 2.9.  She was given IV potassium and subsequently felt much better.  She was not having any chest pain.  She did have some right rib cage pain which was secondary to a fracture.  She also had chronic migraine headaches which had recently been aggravating.  She had chronic fatigue.  She had night sweats.  She was employed as a .  She denied any bleeding episodes or easy bruisability.      PAST MEDICAL HISTORY:  As above was consistent with asthma, migraine headaches, closed fractures, multiple ribs on the right sided, chronic sinusitis, hypothyroidism.  When we saw her, we felt the elevated D-dimer was likely secondary to injury factor.  Nonetheless we wanted to rule out occult malignancy.  Given the presence of a questionable cyst on the liver exam, we obtained an MRI of the abdomen and this essentially confirmed the presence of a hepatic cyst.  This was in the anterior aspect of the liver in the left lobe.  There was no evidence of a suspicious lesion or lymphadenopathy.  There was a high signal in the anterior margins of a number of low right ribs  consistent with healing fractures in both the right seventh and eighth ribs.  We did repeat her D-dimer; it had dropped to 3799.  We also obtained an Marcelo-Barr virus profile which was negative.  We also obtained an antithrombin III, homocysteine levels, lupus anticoagulant.  There was no evidence of hypercoagulable state.  Prothrombin time was normal.  Blood cultures were negative.        She comes in today and states she is having extreme nausea, diarrhea.  She is having some abdominal pain and plans to have a HIDA scan tomorrow ordered by Dr. Romero.  But otherwise she has been having loose stools, epigastric pain, nausea associated with this, it is almost presyncope.  She does have a family history of stomach cancer and she is concerned about malignancy.      PHYSICAL EXAMINATION:   GENERAL:  She is a middle-aged white female in no acute distress.   VITAL SIGNS:  Blood pressure 120/76, pulse 82, respirations 16, temperature 98.   HEENT:  Atraumatic, normocephalic.  Oropharynx is erythematous.   NECK:  Supple.   LUNGS:  Clear to auscultation and percussion.   HEART:  Regular rhythm.  S1, S2 normal.   ABDOMEN:  Soft.  There is tenderness over the epigastrium.  Normoactive bowel sounds.   LYMPHATICS:  No cervical, supraclavicular, axillary or inguinal nodes.   EXTREMITIES:  No edema.   NEUROLOGIC:  Grossly nonfocal.      LABORATORY DATA:  Reveal CBC with white count of 6.4, H&H 12.9 and 39.4, D-dimer is pending, calcium 8.4, albumin 4.0.  LDH is 144.      IMPRESSION/PLAN:  An elevated D-dimer is suspected secondary to acute injury, i.e. rib fractures.  Nonetheless, given her complaints of dyspepsia and family history of stomach cancer, we would like to obtain an EGD as well as a colonoscopy.  She is scheduled to have a HIDA scan.  We will also repeat the fibrinogen and D-dimer.  Otherwise, we will see the patient after the above workup.      Forty minutes was spent with patient, greater than half the time was  spent in counseling.  We did discuss her labs and also ordered consults with Dr. Rockwell.         AMY CAICEDO MD             D: 2017 17:27   T: 2017 22:57   MT: CORAL      Name:     DOLLY MANN   MRN:      [id number]        Account:      [id number]   :      1975           Visit Date:   2017      Document: [id number]       cc: Astrid Rockwell MD

## 2017-04-19 NOTE — TELEPHONE ENCOUNTER
Pt is scheduled for a NM hida scan today 7/19/17. I believe the test you want for this patient is a NM Hida scan with EF. This tests the function of the gallbladder. I placed a new order in epic, it just needs to be signed.

## 2017-04-19 NOTE — TELEPHONE ENCOUNTER
"Called pt to update on labs. had sent pt to ER for elevated D-dimer.Pt states,\"they didn't find anything and I fell OK today\".    Melisa Agarwal    "

## 2017-04-27 DIAGNOSIS — E03.9 HYPOTHYROIDISM: ICD-10-CM

## 2017-04-27 DIAGNOSIS — F43.21 ADJUSTMENT DISORDER WITH DEPRESSED MOOD: ICD-10-CM

## 2017-04-28 ENCOUNTER — TRANSFERRED RECORDS (OUTPATIENT)
Dept: HEALTH INFORMATION MANAGEMENT | Facility: HOSPITAL | Age: 42
End: 2017-04-28

## 2017-04-28 RX ORDER — THYROID 30 MG/1
TABLET ORAL
Qty: 90 TABLET | Refills: 1 | Status: SHIPPED | OUTPATIENT
Start: 2017-04-28 | End: 2017-05-08

## 2017-05-03 ENCOUNTER — HOSPITAL ENCOUNTER (OUTPATIENT)
Dept: ULTRASOUND IMAGING | Facility: HOSPITAL | Age: 42
End: 2017-05-03
Attending: FAMILY MEDICINE
Payer: COMMERCIAL

## 2017-05-03 ENCOUNTER — HOSPITAL ENCOUNTER (OUTPATIENT)
Dept: NUCLEAR MEDICINE | Facility: HOSPITAL | Age: 42
Discharge: HOME | End: 2017-05-03
Attending: FAMILY MEDICINE | Admitting: FAMILY MEDICINE
Payer: COMMERCIAL

## 2017-05-03 PROCEDURE — 78306 BONE IMAGING WHOLE BODY: CPT | Mod: TC

## 2017-05-03 PROCEDURE — A9503 TC99M MEDRONATE: HCPCS | Mod: TC

## 2017-05-03 PROCEDURE — 93306 TTE W/DOPPLER COMPLETE: CPT | Mod: TC

## 2017-05-03 PROCEDURE — 93306 TTE W/DOPPLER COMPLETE: CPT | Mod: 26 | Performed by: INTERNAL MEDICINE

## 2017-05-03 RX ORDER — TC 99M MEDRONATE 20 MG/10ML
25 INJECTION, POWDER, LYOPHILIZED, FOR SOLUTION INTRAVENOUS ONCE
Status: COMPLETED | OUTPATIENT
Start: 2017-05-03 | End: 2017-05-03

## 2017-05-03 RX ADMIN — TC 99M MEDRONATE 25 MCI.: 20 INJECTION, POWDER, LYOPHILIZED, FOR SOLUTION INTRAVENOUS at 08:56

## 2017-05-08 ENCOUNTER — ONCOLOGY VISIT (OUTPATIENT)
Dept: ONCOLOGY | Facility: OTHER | Age: 42
End: 2017-05-08
Attending: INTERNAL MEDICINE
Payer: COMMERCIAL

## 2017-05-08 VITALS
OXYGEN SATURATION: 99 % | HEIGHT: 65 IN | TEMPERATURE: 98.5 F | RESPIRATION RATE: 16 BRPM | SYSTOLIC BLOOD PRESSURE: 127 MMHG | HEART RATE: 75 BPM | WEIGHT: 150 LBS | DIASTOLIC BLOOD PRESSURE: 80 MMHG | BODY MASS INDEX: 24.99 KG/M2

## 2017-05-08 DIAGNOSIS — R79.89 ELEVATED D-DIMER: ICD-10-CM

## 2017-05-08 DIAGNOSIS — B37.0 THRUSH: Primary | ICD-10-CM

## 2017-05-08 LAB
ALBUMIN SERPL-MCNC: 3.9 G/DL (ref 3.4–5)
ALP SERPL-CCNC: 54 U/L (ref 40–150)
ALT SERPL W P-5'-P-CCNC: 22 U/L (ref 0–50)
ANION GAP SERPL CALCULATED.3IONS-SCNC: 9 MMOL/L (ref 3–14)
AST SERPL W P-5'-P-CCNC: 19 U/L (ref 0–45)
BASOPHILS # BLD AUTO: 0 10E9/L (ref 0–0.2)
BASOPHILS NFR BLD AUTO: 0.6 %
BILIRUB SERPL-MCNC: 0.3 MG/DL (ref 0.2–1.3)
BUN SERPL-MCNC: 11 MG/DL (ref 7–30)
CALCIUM SERPL-MCNC: 8.4 MG/DL (ref 8.5–10.1)
CHLORIDE SERPL-SCNC: 107 MMOL/L (ref 94–109)
CO2 SERPL-SCNC: 25 MMOL/L (ref 20–32)
CREAT SERPL-MCNC: 0.7 MG/DL (ref 0.52–1.04)
D DIMER PPP DDU-MCNC: 3725 NG/ML D-DU (ref 0–300)
DIFFERENTIAL METHOD BLD: NORMAL
EOSINOPHIL # BLD AUTO: 0 10E9/L (ref 0–0.7)
EOSINOPHIL NFR BLD AUTO: 0 %
ERYTHROCYTE [DISTWIDTH] IN BLOOD BY AUTOMATED COUNT: 12.2 % (ref 10–15)
GFR SERPL CREATININE-BSD FRML MDRD: ABNORMAL ML/MIN/1.7M2
GLUCOSE SERPL-MCNC: 88 MG/DL (ref 70–99)
HCT VFR BLD AUTO: 39.3 % (ref 35–47)
HGB BLD-MCNC: 13.6 G/DL (ref 11.7–15.7)
IMM GRANULOCYTES # BLD: 0 10E9/L (ref 0–0.4)
IMM GRANULOCYTES NFR BLD: 0.5 %
LDH SERPL L TO P-CCNC: 175 U/L (ref 81–234)
LYMPHOCYTES # BLD AUTO: 1.5 10E9/L (ref 0.8–5.3)
LYMPHOCYTES NFR BLD AUTO: 23.2 %
MCH RBC QN AUTO: 30.2 PG (ref 26.5–33)
MCHC RBC AUTO-ENTMCNC: 34.6 G/DL (ref 31.5–36.5)
MCV RBC AUTO: 87 FL (ref 78–100)
MONOCYTES # BLD AUTO: 0.4 10E9/L (ref 0–1.3)
MONOCYTES NFR BLD AUTO: 6.4 %
NEUTROPHILS # BLD AUTO: 4.4 10E9/L (ref 1.6–8.3)
NEUTROPHILS NFR BLD AUTO: 69.3 %
NRBC # BLD AUTO: 0 10*3/UL
NRBC BLD AUTO-RTO: 0 /100
PLATELET # BLD AUTO: 313 10E9/L (ref 150–450)
POTASSIUM SERPL-SCNC: 3.7 MMOL/L (ref 3.4–5.3)
PROT SERPL-MCNC: 7.7 G/DL (ref 6.8–8.8)
RBC # BLD AUTO: 4.5 10E12/L (ref 3.8–5.2)
SODIUM SERPL-SCNC: 141 MMOL/L (ref 133–144)
WBC # BLD AUTO: 6.3 10E9/L (ref 4–11)

## 2017-05-08 PROCEDURE — 83615 LACTATE (LD) (LDH) ENZYME: CPT | Performed by: INTERNAL MEDICINE

## 2017-05-08 PROCEDURE — 99215 OFFICE O/P EST HI 40 MIN: CPT | Performed by: INTERNAL MEDICINE

## 2017-05-08 PROCEDURE — 80053 COMPREHEN METABOLIC PANEL: CPT | Performed by: INTERNAL MEDICINE

## 2017-05-08 PROCEDURE — 36415 COLL VENOUS BLD VENIPUNCTURE: CPT | Performed by: INTERNAL MEDICINE

## 2017-05-08 PROCEDURE — 85379 FIBRIN DEGRADATION QUANT: CPT | Performed by: INTERNAL MEDICINE

## 2017-05-08 PROCEDURE — 85025 COMPLETE CBC W/AUTO DIFF WBC: CPT | Performed by: INTERNAL MEDICINE

## 2017-05-08 RX ORDER — NYSTATIN 100000/ML
500000 SUSPENSION, ORAL (FINAL DOSE FORM) ORAL 4 TIMES DAILY
Qty: 280 ML | Refills: 0 | Status: SHIPPED | OUTPATIENT
Start: 2017-05-08 | End: 2017-05-30

## 2017-05-08 ASSESSMENT — PAIN SCALES - GENERAL: PAINLEVEL: MILD PAIN (2)

## 2017-05-08 NOTE — PATIENT INSTRUCTIONS
We would like to see you back after your [facility] appointment.Your prescription has been sent to:   [id number] - [place], [place] - [address] AT [facility] [address]  [place] [address]  Phone: [phone] Fax: [phone]    [facility] - [place] - [address]  [address] [facility] [place] [address]  Phone: [phone] Fax: [phone]  When you are in need of a refill of your medications, please call your pharmacy and they will send us the request. If you have any questions please call [phone]

## 2017-05-08 NOTE — NURSING NOTE
"Chief Complaint   Patient presents with     RECHECK     Follow up elevated d dimer and EGD and colonoscopy       Initial /80 (BP Location: Right arm, Patient Position: Chair, Cuff Size: Adult Regular)  Pulse 75  Temp 98.5  F (36.9  C) (Tympanic)  Resp 16  Ht 1.651 m (5' 5\")  Wt 68 kg (150 lb)  SpO2 99%  BMI 24.96 kg/m2 Estimated body mass index is 24.96 kg/(m^2) as calculated from the following:    Height as of this encounter: 1.651 m (5' 5\").    Weight as of this encounter: 68 kg (150 lb).  Medication Reconciliation: complete     Queta Vaz      Patient was assessed using the NCCN psychosocial distress thermometer. Patient rated the score as a 6. Patient rated current stressors as having the colonoscopy. Stressors will be brought to the attention of provider or Oncology RN Care Coordinator for a score of 6 or greater or per nurses discretion.       Queta Vaz      "

## 2017-05-08 NOTE — MR AVS SNAPSHOT
After Visit Summary   5/8/2017    Deanne Yee    MRN: [id number]           Patient Information     Date Of Birth          1975        Visit Information        Provider Department      5/8/2017 9:00 AM Norberto Jaramillo MD [facility]        Today's Diagnoses     Thrush    -  1    Elevated d-dimer          Care Instructions    We would like to see you back after your [facility] appointment.Your prescription has been sent to:   [id number] - [place] [place] - [address] AT [facility] [address]  [place] [address]  Phone: [phone] Fax: [phone]    [facility] - [place], [place] - [address]  [address]  [place] [address]  Phone: [phone] Fax: [phone]  When you are in need of a refill of your medications, please call your pharmacy and they will send us the request. If you have any questions please call [phone]          Follow-ups after your visit        Additional Services     [facility] REFERRAL       We have referred you to [facility], Dr. Hagen, for elevated D - Dimer                  Your next 10 appointments already scheduled     May 30, 2017 11:00 AM CDT   LAB with  LAB   [facility] ([facility] [place] [facility])    [address]  [place] [id number] [phone]            May 30, 2017 11:30 AM CDT   Return Visit with Norberto Jaramillo MD   [facility] ([facility])    [address]  [facility] [id number] [phone]              Who to contact     If you have questions or need follow up information about today's clinic visit or your schedule please contact [facility] directly at [phone].  Normal or non-critical lab and imaging results will be communicated to you by MyChart, letter or phone within 4 business days after the clinic has received the results. If you do not hear from us within 7 days, please contact the clinic through MyChart or  "phone. If you have a critical or abnormal lab result, we will notify you by phone as soon as possible.  Submit refill requests through [employer]-[facility] or call your pharmacy and they will forward the refill request to us. Please allow 3 business days for your refill to be completed.          Additional Information About Your Visit        [facility] Information     [facility] lets you send messages to your doctor, view your test results, renew your prescriptions, schedule appointments and more. To sign up, go to [email or web address] . Click on \"Log in\" on the left side of the screen, which will take you to the Welcome page. Then click on \"Sign up Now\" on the right side of the page.     You will be asked to enter the access code listed below, as well as some personal information. Please follow the directions to create your username and password.     Your access code is: [email or web address]  Expires: 2017  4:28 PM     Your access code will  in 90 days. If you need help or a new code, please call your [facility] clinic or [phone].        Care EveryWhere ID     This is your Care EveryWhere ID. This could be used by other organizations to access your [facility] medical records  [id number]        Your Vitals Were     Pulse Temperature Respirations Height Pulse Oximetry BMI (Body Mass Index)    75 98.5  F (36.9  C) (Tympanic) 16 1.651 m (5' 5\") 99% 24.96 kg/m2       Blood Pressure from Last 3 Encounters:   17 127/80   17 120/81   17 119/76    Weight from Last 3 Encounters:   17 68 kg (150 lb)   17 66.2 kg (145 lb 14.4 oz)   04/10/17 67.1 kg (148 lb)              We Performed the Following     CBC with platelets differential     Comprehensive metabolic panel     D-Dimer ([facility])     Lactate Dehydrogenase     [facility] REFERRAL          Today's Medication Changes          These changes are accurate as of: 17 10:03 AM.  If you have any questions, ask your nurse or doctor.               Start " taking these medicines.        Dose/Directions    nystatin 239804 UNIT/ML suspension   Commonly known as:  MYCOSTATIN   Used for:  Thrush   Started by:  Norberto Jaramillo MD        Dose:  607565 Units   Take 5 mLs (500,000 Units) by mouth 4 times daily Swish and swallow 4 times a day for 10 days   Quantity:  280 mL   Refills:  0            Where to get your medicines      These medications were sent to [id number] [place] - [address] AT [facility] of [address], [place] [address]     Phone:  [phone]     nystatin [id number] UNIT/ML suspension                Primary Care Provider Office Phone # Fax #    Astrid Romero [phone]       [facility] [place] [address] [facility] [address]        Thank you!     Thank you for choosing [facility]3]  for your care. Our goal is always to provide you with excellent care. Hearing back from our patients is one way we can continue to improve our services. Please take a few minutes to complete the written survey that you may receive in the mail after your visit with us. Thank you!             Your Updated Medication List - Protect others around you: Learn how to safely use, store and throw away your medicines at www.disposemymeds.org.          This list is accurate as of: 5/8/17 10:03 AM.  Always use your most recent med list.                   Brand Name Dispense Instructions for use    ARMOUR THYROID 30 MG tablet   Generic drug:  thyroid     90 tablet    TAKE 1 TABLET BY MOUTH EVERY DAY       CHILDRENS MULTIVITAMIN PO      Take 2 tablets by mouth daily       HYDROcodone-acetaminophen 5-325 MG per tablet    NORCO    20 tablet    Take 1 tablet by mouth 2 times daily as needed for moderate to severe pain       METRONIDAZOLE PO      Take 500 mg by mouth Patient unsure of dose but she is taking three times a day       nystatin 804979 UNIT/ML suspension    MYCOSTATIN    280 mL    Take 5 mLs (500,000  Units) by mouth 4 times daily Swish and swallow 4 times a day for 10 days       PANTOPRAZOLE SODIUM PO      Take 40 mg by mouth Unsure of dose but is taking one pill in the morning       ranitidine 300 MG tablet    ZANTAC    30 tablet    Take 1 tablet (300 mg) by mouth At Bedtime       * sertraline 25 MG tablet    ZOLOFT    90 tablet    TAKE 1 TABLET BY MOUTH EVERY DAY ALONG WITH THE 50MG TABLET       * sertraline 50 MG tablet    ZOLOFT    30 tablet    TAKE 1 TABLET BY MOUTH EVERY DAY       VITAMIN D3 PO      Take 1 tablet by mouth daily       * Notice:  This list has 2 medication(s) that are the same as other medications prescribed for you. Read the directions carefully, and ask your doctor or other care provider to review them with you.

## 2017-05-09 NOTE — PROGRESS NOTES
HEMATOLOGY, ONCOLOGY CLINIC NOTE      DATE OF VISIT:  05/08/2017.      HISTORY OF PRESENT ILLNESS:  Ms. Deanne Yee returns for followup of elevated D-dimer history.  We had seen her in consultation at the request of Dr. Romero on 04/10/2017.  At that time she had a history of asthma, migraine headaches, pneumonia, and had recently been seeing Dr. Astrid Romero on 10/22/2016 with complaints of cough and right rib cage pain.  Chest x-ray was obtained and revealed evidence of pneumonia and a right lower lobe infiltrate.  She was treated empirically with a Z-Yobany and subsequently she was seen in the emergency room on 03/29 with complaints of sinus symptoms and cough which had not gone away, dizziness to the point where she had to pull over in her car.  She felt like she was going to faint.  She was seen by Marcia Moore PA-C who had ordered a chest x-ray initially which revealed a granuloma in the right lower lobe and a D-dimer which came back extremely elevated at 4100.  He ordered a CT chest, which revealed no evidence of pulmonary embolus.  There were healing fractures involving the anterior margin of the right 6th and 7th ribs which could account for the right-sided chest pain.  There was a calcified granuloma in the right lower lobe, a 13 mm cyst in the left lobe of the liver.  Subsequent followup with Dr. Romero was done on 04/06/2017 and at that time a D-dimer was greater than 5100.  He performed lab work including serum protein electrophoresis which was negative, CRP which was slightly elevated, sed rate was normal.  LDH was normal.  Liver functions were essentially negative.  There were no histiocytes noted.  EKG was normal.  She did have complaints of abdominal pain and a right upper quadrant ultrasound of the abdomen was essentially negative.  This was repeated again on 04/07 and it was negative.  She was subsequently seen in the emergency room again on 04/09/2017 with complaints of near-syncope,  shortness of breath, chest heaviness and numbness and tingling sensation.  She was seen by nurse practitioner, Elizabet Hanks.  An EKG was normal.  Workup including a CT chest revealed no evidence of pulmonary embolus, probable cyst of the left lobe of the liver.  She had an MRI of the brain which revealed nonspecific findings.  There was extensive mucosal thickening in both left maxillary and ethmoid right frontal and right sphenoid sinuses.  She also had bilateral venous Dopplers on 04/07 which were negative.  Repeat D-dimer was drawn on 04/09 which did improve to 39 and 36.  Fibrinogen had been ordered which was slightly elevated at 433.  B12 was also ordered and this was negative.  Beta hCG was negative.  The patient was menstruating when we saw her on 04/10 and she was not using birth control at that point.  She was noted to have a low potassium in the emergency room on 04/09, her potassium was 2.9.  She was given IV potassium and subsequently felt much better.  She was not having any chest pain.  She did have some right rib cage pain which was secondary to her fracture.  She also had chronic migraine headaches which recently had been aggravated.  She had denied any bleeding episodes or easy bruisability.  She was employed as a .  Given the elevated D-dimer, we felt it was likely due to acute injury, i.e. rib fractures, but given her complaints of dyspepsia, we were concerned about possible malignancy, i.e., ulcer versus stomach malignancy, given her family history of stomach cancer.  A HIDA scan was obtained and was negative.  Nonetheless, when we saw her on 04/18, her D-dimer came back extremely elevated at greater than 5100 and we sent her to the emergency room.  She was evaluated and felt she was essentially asymptomatic.  They did do bilateral venous Dopplers which were negative.  We also sent her to Dr. Rockwell for EGD, colonoscopy and she was found to have esophagitis and also on stool  cultures she was found to have C. difficile and is currently being treated with Flagyl.  She comes in today.  She says her major complaint is that she has a whitish-black tongue she says and dry mouth.  She also complains of some right upper quadrant/rib cage pain.  Otherwise, no complaints of shortness of breath, chest pain, abdominal pain, fevers, night sweats, weight loss.      PHYSICAL EXAMINATION:   GENERAL:  She is a middle-aged white female in no acute distress.   VITAL SIGNS:  Blood pressure 127/80, pulse 75, respirations 16, temperature 98.5.   HEENT:  Reveals dry oral mucosa with whitish tongue.   NECK:  Supple, no thyromegaly.   LUNGS:  Clear to auscultation and percussion.   HEART:  Regular rhythm, S1 is normal.   ABDOMEN:  Soft, some minimal right upper quadrant tenderness to palpation, no rebound.   LYMPHATICS:  No cervical, supraclavicular, axillary or inguinal nodes.   EXTREMITIES:  No edema.   NEUROLOGIC:  Nonfocal.      LABORATORY DATA:  Reveal D-dimer of 3735.  CBC:  White count 6.3, H&H 13.6 and 33.3, platelet count is 313.  LFTs are normal.  Potassium is normal at 3.7.      IMPRESSION:  History of elevated D-dimer of unknown etiology, question secondary inflammatory process, i.e., C. diff colitis versus acute injury.  Nonetheless she may have a fibrin degradation disorder, a fibrinogen problem that needs to be evaluated by a hematology specialist.  Will therefore refer the patient to Dr. Anisa Hagen at the [facility].  She will continue to follow up with Dr. Rockwell concerning her metronidazole which she is currently on and has 2 more days of in terms of treatment for her Clostridium difficile colitis.  Otherwise, we will see the patient back after the above workup.      Forty minutes was spent with the patient, greater than half the time was spent in counseling.  We discussed the results of the colonoscopy as well as EGD as well as Clostridium difficile colitis and the fact that she had an  elevated D-dimer of unknown etiology, and we wanted to get a second opinion from a hematology specialist at the [facility], Dr. Anisa Hagen.         AMY CAICEDO MD             D: 2017 13:18   T: 2017 02:46   MT: [email or web address]      Name:     DOLLY MANN   MRN:      [id number]        Account:      [id number]   :      1975           Visit Date:   2017      Document: [id number]       cc: Anisa Rockwell MD

## 2017-05-11 ENCOUNTER — TELEPHONE (OUTPATIENT)
Dept: ONCOLOGY | Facility: OTHER | Age: 42
End: 2017-05-11

## 2017-05-18 ENCOUNTER — TRANSFERRED RECORDS (OUTPATIENT)
Dept: HEALTH INFORMATION MANAGEMENT | Facility: HOSPITAL | Age: 42
End: 2017-05-18

## 2017-05-18 LAB
CREAT SERPL-MCNC: 0.7 MG/DL (ref 0.6–1.1)
POTASSIUM SERPL-SCNC: 3.9 MMOL/L (ref 3.6–5.2)
TSH SERPL-ACNC: 1.9 MIU/L (ref 0.3–4.2)

## 2017-05-19 ENCOUNTER — TRANSFERRED RECORDS (OUTPATIENT)
Dept: HEALTH INFORMATION MANAGEMENT | Facility: HOSPITAL | Age: 42
End: 2017-05-19

## 2017-05-25 DIAGNOSIS — K29.00 ACUTE GASTRITIS WITHOUT HEMORRHAGE, UNSPECIFIED GASTRITIS TYPE: ICD-10-CM

## 2017-05-26 NOTE — TELEPHONE ENCOUNTER
Ranitidine      Last Written Prescription Date: 4/27/17  Last Fill Quantity: 30,  # refills: 0   Last Office Visit with G, P or [facility] prescribing provider: 4/7/17                                         Next 5 appointments (look out 90 days)     May 30, 2017 11:30 AM CDT   Return Visit with Norberto Jaramillo MD   [facility] ([facility])    [address] [place] [id number]   [phone]

## 2017-05-30 ENCOUNTER — APPOINTMENT (OUTPATIENT)
Dept: LAB | Facility: OTHER | Age: 42
End: 2017-05-30
Attending: INTERNAL MEDICINE
Payer: COMMERCIAL

## 2017-05-30 ENCOUNTER — ONCOLOGY VISIT (OUTPATIENT)
Dept: ONCOLOGY | Facility: OTHER | Age: 42
End: 2017-05-30
Attending: INTERNAL MEDICINE
Payer: COMMERCIAL

## 2017-05-30 VITALS
HEART RATE: 60 BPM | RESPIRATION RATE: 16 BRPM | OXYGEN SATURATION: 95 % | DIASTOLIC BLOOD PRESSURE: 77 MMHG | BODY MASS INDEX: 24.93 KG/M2 | TEMPERATURE: 97.7 F | WEIGHT: 149.6 LBS | SYSTOLIC BLOOD PRESSURE: 112 MMHG | HEIGHT: 65 IN

## 2017-05-30 DIAGNOSIS — R79.89 ELEVATED D-DIMER: Primary | ICD-10-CM

## 2017-05-30 PROCEDURE — 99215 OFFICE O/P EST HI 40 MIN: CPT | Performed by: INTERNAL MEDICINE

## 2017-05-30 RX ORDER — FERROUS SULFATE 325(65) MG
325 TABLET ORAL AT BEDTIME
COMMUNITY
Start: 2017-05-25 | End: 2019-10-22

## 2017-05-30 ASSESSMENT — PAIN SCALES - GENERAL: PAINLEVEL: MILD PAIN (2)

## 2017-05-30 NOTE — NURSING NOTE
"Chief Complaint   Patient presents with     RECHECK     follow-up elevated [id number] referral        Initial /77 (BP Location: Right arm, Patient Position: Chair, Cuff Size: Adult Regular)  Pulse 60  Temp 97.7  F (36.5  C) (Tympanic)  Resp 16  Ht 1.651 m (5' 5\")  Wt 67.9 kg (149 lb 9.6 oz)  SpO2 95%  BMI 24.89 kg/m2 Estimated body mass index is 24.89 kg/(m^2) as calculated from the following:    Height as of this encounter: 1.651 m (5' 5\").    Weight as of this encounter: 67.9 kg (149 lb 9.6 oz).  Medication Reconciliation: hari Orozco    Patient was assessed using the NCCN psychosocial distress thermometer. Patient rated the score as a 5. Patient rated current stressors as moving to [place], extended family issues, work stressful not as much though due to patient leaving. Stressors will be brought to the attention of provider or Oncology RN Care Coordinator for a score of 6 or greater or per nurses discretion.               "

## 2017-05-30 NOTE — MR AVS SNAPSHOT
"              After Visit Summary   5/30/2017    Deanne Yee    MRN: [id number]           Patient Information     Date Of Birth          1975        Visit Information        Provider Department      5/30/2017 11:30 AM [facility]        Today's Diagnoses     Elevated d-dimer    -  1       Follow-ups after your visit        Future tests that were ordered for you today     Open Future Orders        Priority Expected Expires Ordered    CBC with platelets differential Routine 6/1/2017 8/1/2017 5/30/2017    Comprehensive metabolic panel Routine 6/1/2017 8/1/2017 5/30/2017    Lactate Dehydrogenase Routine 6/1/2017 8/1/2017 5/30/2017    Iron and iron binding capacity Routine 6/1/2017 8/1/2017 5/30/2017    Ferritin Routine 6/1/2017 8/1/2017 5/30/2017            Who to contact     If you have questions or need follow up information about today's clinic visit or your schedule please contact [facility] directly at [phone].  Normal or non-critical lab and imaging results will be communicated to you by [employer], letter or phone within 4 business days after the clinic has received the results. If you do not hear from us within 7 days, please contact the clinic through [employer] or phone. If you have a critical or abnormal lab result, we will notify you by phone as soon as possible.  Submit refill requests through [employer] or call your pharmacy and they will forward the refill request to us. Please allow 3 business days for your refill to be completed.          Additional Information About Your Visit        [employer] Information     [employer] lets you send messages to your doctor, view your test results, renew your prescriptions, schedule appointments and more. To sign up, go to [email or web address] . Click on \"Log in\" on the left side of the screen, which will take you to the Welcome page. Then click on \"Sign up Now\" on the right side of the page.     You will be asked " "to enter the access code listed below, as well as some personal information. Please follow the directions to create your username and password.     Your access code is: [email or web address]  Expires: 2017  7:08 PM     Your access code will  in 90 days. If you need help or a new code, please call your [place] clinic or [phone].        Care EveryWhere ID     This is your Care EveryWhere ID. This could be used by other organizations to access your [place] medical records  [id number]        Your Vitals Were     Pulse Temperature Respirations Height Pulse Oximetry BMI (Body Mass Index)    60 97.7  F (36.5  C) (Tympanic) 16 1.651 m (5' 5\") 95% 24.89 kg/m2       Blood Pressure from Last 3 Encounters:   17 112/77   17 127/80   17 120/81    Weight from Last 3 Encounters:   17 67.9 kg (149 lb 9.6 oz)   17 68 kg (150 lb)   17 66.2 kg (145 lb 14.4 oz)               Primary Care Provider Office Phone # Fax #    Astrid Romero [phone]       [facility] [place] [address] [place] [id number]        Thank you!     Thank you for choosing [facility] [place]  for your care. Our goal is always to provide you with excellent care. Hearing back from our patients is one way we can continue to improve our services. Please take a few minutes to complete the written survey that you may receive in the mail after your visit with us. Thank you!             Your Updated Medication List - Protect others around you: Learn how to safely use, store and throw away your medicines at www.disposemymeds.org.          This list is accurate as of: 17  7:08 PM.  Always use your most recent med list.                   Brand Name Dispense Instructions for use    ARMOUR THYROID 30 MG tablet   Generic drug:  thyroid     90 tablet    TAKE 1 TABLET BY MOUTH EVERY DAY       ASHWAGANDHA PO      Take by mouth At Bedtime       CHILDRENS MULTIVITAMIN PO      Take 2 tablets by " mouth daily       ferrous sulfate 325 (65 FE) MG tablet    IRON     Take 325 mg by mouth At Bedtime       HYDROcodone-acetaminophen 5-325 MG per tablet    NORCO    20 tablet    Take 1 tablet by mouth 2 times daily as needed for moderate to severe pain       PANTOPRAZOLE SODIUM PO      Take 40 mg by mouth Unsure of dose but is taking one pill in the morning       ranitidine 300 MG tablet    ZANTAC    30 tablet    TAKE 1 TABLET(300 MG) BY MOUTH AT BEDTIME       * sertraline 25 MG tablet    ZOLOFT    90 tablet    TAKE 1 TABLET BY MOUTH EVERY DAY ALONG WITH THE 50MG TABLET       * sertraline 50 MG tablet    ZOLOFT    30 tablet    TAKE 1 TABLET BY MOUTH EVERY DAY       VITAMIN D3 PO      Take 1 tablet by mouth daily       * Notice:  This list has 2 medication(s) that are the same as other medications prescribed for you. Read the directions carefully, and ask your doctor or other care provider to review them with you.

## 2017-05-31 NOTE — PROGRESS NOTES
HEMATOLOGY/ONCOLOGY CLINIC NOTE       DATE OF SERVICE:  05/30/2017      HISTORY OF PRESENT ILLNESS:  Ms. Deanne Yee returns for followup of elevated D-dimer history.  We had seen her in consultation at the request of Dr. Romero on 04/10/2017.  At that time she had a history of asthma, migraine headaches, pneumonia, had recently been seeing Dr. Astrid Romero on 10/22/2016 with complaints of cough and right rib cage pain.  Chest x-ray was obtained that revealed evidence of pneumonia and a right lower lobe infiltrate.  She was treated empirically with Z-CASSIDY and subsequently she was seen in the emergency room on 03/29 with complaints of sinus symptoms of cough which has not gone away, dizziness the point where had to pull over in her car, she felt like she was going to faint.  She was seen by Marcia Moore PA-C who was ordered a chest x-ray initially which revealed a granuloma in the right lower lobe and a D-dimer which came back extremely elevated at 4100.  He ordered a CT chest, which revealed no evidence of pulmonary embolus.  There were healing fractures involving anterior margin right 6th and 7th ribs which could account for the right-sided chest pain.  There was a calcified granuloma in the right lower lobe with a 13 mm cyst in the left lobe of the liver.  Subsequently, the patient was followed up with Dr. Romero on 04/06/2017 who ordered a D-dimer that came back greater than 5100.  She performed lab work including serum protein electrophoresis which was negative.  CRP which was slightly elevated.  Sed rate was normal.  LDH was normal.  Liver functions were essentially negative.  There are no histiocytes noted.  EKG was normal.  The patient did have complaints of abdominal pain and right upper quadrant ultrasound of the abdomen was essentially negative.  This was repeated again on 04/07 and was negative.  She was subsequently seen in the emergency room again on 04/09/2017 with complaints of near-syncope,  shortness of breath and chest heaviness and numbness and tingling sensation.  She was seen by nurse practitioner Elizabet Hanks.  EKG was normal.  Workup including CT chest revealed no evidence of pulmonary embolus, probable cyst in the left lobe of the liver.  She had an MRI of the brain which revealed nonspecific findings.  There was extensive mucosal thickening, both left maxillary and ethmoid, right frontal and right sphenoid sinuses.  She also had bilateral venous Dopplers on 04/07 and which were negative.  Repeat D-dimer was drawn on 04/09 which did improve to 39 and 36.  Fibrinogen had been ordered which was slightly elevated at 433.  B12 was also ordered and this was negative.  Beta hCG was negative.  The patient was menstruating when we saw her on 04/10 and she was not using birth control.  At that point she was noted to have a low potassium.  In the emergency room on 04/09, potassium 2.9.  She was given IV potassium and subsequently felt much better.  She was not having chest pain.  She did have some right rib cage pain which was secondary to her fracture.  She also had chronic migraine headaches which recently had been aggravated.  She denied any bleeding episodes or easy bruisability.  She is employed as a .  Decided given her elevated D-dimer, we felt it was likely due to an acute injury, i.e. rib fracture.  Given her complaints of dyspepsia, we were concerned about possible malignancy, i.e. ulcer versus stomach malignancy.  Given her family history of stomach cancer, HIDA scan was obtained and was negative.  Nonetheless, on 04/18 her D-dimer came back extremely elevated, greater than 5100, and we sent her to the emergency room.  She was evaluated and it was felt she was essentially asymptomatic.  Bilateral venous Dopplers were performed and negative.  We also sent her to Dr. Rockwell of GI for EGD, colonoscopy and she was found to have esophagitis, also on stool cultures was found to have C.  difficile.  She was treated with Flagyl.  When we saw her last, we felt that she had a history of elevated D-dimer of unknown etiology.  We thought it may be related to inflammatory process, i.e. Clostridium difficile colitis versus acute injury.  Nonetheless, we recommended that she be seen by the Coagulation Clinic at the [facility] and referred her there and she was seen by Dr. Crump on 05/18/2017.  It was his impression that possibly could be related to strenuous exercise or possibly tachyarrhythmias.  He felt her hypokalemia could be due to beta adrenergic outpouring due to stress as the patient has had anxiety related to certain life issues including moving to [place], which she plans to do at the end of the month.  Nonetheless they did repeat testing for intravascular coagulation, fibrinolysis.  The studies showed elevated fibrin, D-dimer was minimally above 1000.  However, the soluble fiber monitor was undetectable and results indicated increased fibrinolysis but undetectable soluble fibrin monitor and not provide any laboratory evidence for ongoing intravascular thrombin generation, therefore ongoing intravascular coagulation.  These results were nonspecific and he felt did not require further evaluation.  The patient also had other testing which showed no obvious evidence of inflammation.  She had a normal sed rate, C-reactive protein.  Patient had normal electrolytes, normal thyroid function.  It was Dr. Crump's recommendation that the patient not have any more further testing and we should stop checking her D-dimer level, this is a normal variant.  He felt the patient should be evaluated for dizziness, palpitations.  She did have an echocardiogram to rule out postpartum cardiomyopathy and this showed normal ventricular chamber size and function, normal right ventricular function, normal right ventricular systolic pressure.  Nonetheless, the patient was told, according to the patient, to take some  oral iron as her iron was borderline low.  Nonetheless, she is currently on oral iron.  Plans to move to [place] at the end of July.   She would like to be seen one more time and have her iron checked.  Otherwise, there is no further workup necessary.  The patient currently remains asymptomatic.  She denies any shortness of breath, chest pain, abdominal pain, fevers, night sweats, weight loss.      PHYSICAL EXAMINATION:   GENERAL:  She is a middle-aged white female in no acute distress.   VITAL SIGNS:  Blood pressure 112/77, pulse 60, respirations 16, temperature 97.7.   HEENT:  Atraumatic, normocephalic.  Oropharynx nonerythematous.   NECK:  Supple.   LUNGS:  Clear to auscultation.   HEART:  Regular rhythm, S1 is normal.   ABDOMEN:  Soft, nontender.   LYMPHATICS:  No cervical or supraclavicular nodes.   EXTREMITIES:  No edema.   NEUROLOGIC:  Nonfocal.      LABORATORY DATA:  Not done.      IMPRESSION:  History of elevated D-dimer of unknown pathology.  The patient has been seen by the [facility] by Dr. Crump and the feeling this is a normal variant and likely a false positive, no further checking of D-dimer is indicated.  Because of her cardiac symptoms, patient had a normal echocardiogram.  The patient is currently on oral iron.  We would like to see the patient prior to her leaving for [place].  I will obtain CBC, CMP, LDH, iron studies.  Otherwise, no further workup is necessary patient has no evidence of thrombotic process or evidence of intravascular coagulation.  As per Dr. Crump, no further workup is necessary.      Forty minutes spent with the patient; half the time spent in counseling, reviewing the notes from the [facility], discussing the notes concerning her visit with Dr. Crump, the fact that she needed further workup.  We also spent time ordering labs and followup care.         AMY CAICEDO MD             D: 05/30/2017 12:58   T: 05/30/2017 23:03   MT: [email or web address]      Name:     MACKENZIE  DOLLY   MRN:      [id number]        Account:      [id number]   :      1975           Visit Date:   2017      Document: [id number]       cc: Copy for Provider        Astrid Crump MD

## 2017-06-09 ENCOUNTER — OFFICE VISIT (OUTPATIENT)
Dept: FAMILY MEDICINE | Facility: OTHER | Age: 42
End: 2017-06-09
Attending: FAMILY MEDICINE
Payer: COMMERCIAL

## 2017-06-09 VITALS
HEART RATE: 71 BPM | TEMPERATURE: 97.8 F | RESPIRATION RATE: 17 BRPM | WEIGHT: 140 LBS | OXYGEN SATURATION: 99 % | BODY MASS INDEX: 23.3 KG/M2 | DIASTOLIC BLOOD PRESSURE: 70 MMHG | SYSTOLIC BLOOD PRESSURE: 110 MMHG

## 2017-06-09 DIAGNOSIS — N91.2 AMENORRHEA: Primary | ICD-10-CM

## 2017-06-09 DIAGNOSIS — Z34.81 ENCOUNTER FOR SUPERVISION OF OTHER NORMAL PREGNANCY IN FIRST TRIMESTER: ICD-10-CM

## 2017-06-09 DIAGNOSIS — R79.89 LOW SERUM PROGESTERONE: ICD-10-CM

## 2017-06-09 LAB — HCG UR QL: POSITIVE

## 2017-06-09 PROCEDURE — 36415 COLL VENOUS BLD VENIPUNCTURE: CPT | Performed by: FAMILY MEDICINE

## 2017-06-09 PROCEDURE — 81025 URINE PREGNANCY TEST: CPT | Performed by: FAMILY MEDICINE

## 2017-06-09 PROCEDURE — 99213 OFFICE O/P EST LOW 20 MIN: CPT | Performed by: FAMILY MEDICINE

## 2017-06-09 PROCEDURE — 84144 ASSAY OF PROGESTERONE: CPT | Mod: 90 | Performed by: FAMILY MEDICINE

## 2017-06-09 PROCEDURE — 99000 SPECIMEN HANDLING OFFICE-LAB: CPT | Performed by: FAMILY MEDICINE

## 2017-06-09 ASSESSMENT — PAIN SCALES - GENERAL: PAINLEVEL: NO PAIN (0)

## 2017-06-09 NOTE — NURSING NOTE
"Chief Complaint   Patient presents with     Confirmation Of Pregnancy       Initial /70  Pulse 71  Temp 97.8  F (36.6  C)  Resp 17  Wt 140 lb (63.5 kg)  SpO2 99%  BMI 23.3 kg/m2 Estimated body mass index is 23.3 kg/(m^2) as calculated from the following:    Height as of 5/30/17: 5' 5\" (1.651 m).    Weight as of this encounter: 140 lb (63.5 kg).  Medication Reconciliation: complete  "

## 2017-06-09 NOTE — MR AVS SNAPSHOT
"              After Visit Summary   2017    Deanne Yee    MRN: [id number]           Patient Information     Date Of Birth          1975        Visit Information        Provider Department      2017 10:45 AM Astrid Romero MD [facility]        Today's Diagnoses     Amenorrhea    -  1    Encounter for supervision of other normal pregnancy in first trimester        Low serum progesterone           Follow-ups after your visit        Who to contact     If you have questions or need follow up information about today's clinic visit or your schedule please [facility] at [phone].  Normal or non-critical lab and imaging results will be communicated to you by [facility], letter or phone within 4 business days after the clinic has received the results. If you do not hear from us within 7 days, please contact the clinic through [facility] or phone. If you have a critical or abnormal lab result, we will notify you by phone as soon as possible.  Submit refill requests through [email or web address] or call your pharmacy and they will forward the refill request to us. Please allow 3 business days for your refill to be completed.          Additional Information About Your Visit        MyChart Information     [email or web address] lets you send messages to your doctor, view your test results, renew your prescriptions, schedule appointments and more. To sign up, go to [email or web address] . Click on \"Log in\" on the left side of the screen, which will take you to the Welcome page. Then click on \"Sign up Now\" on the right side of the page.     You will be asked to enter the access code listed below, as well as some personal information. Please follow the directions to create your username and password.     Your access code is: [email or web address]  Expires: 2017  7:08 PM     Your access code will  in 90 days. If you need help or a new code, please call your [facility] or [phone].   "      Care EveryWhere ID     This is your Care EveryWhere ID. This could be used by other organizations to access your [facility] medical records  [id number]        Your Vitals Were     Pulse Temperature Respirations Pulse Oximetry BMI (Body Mass Index)       71 97.8  F (36.6  C) 17 99% 23.3 kg/m2        Blood Pressure from Last 3 Encounters:   06/09/17 110/70   05/30/17 112/77   05/08/17 127/80    Weight from Last 3 Encounters:   06/09/17 140 lb (63.5 kg)   05/30/17 149 lb 9.6 oz (67.9 kg)   05/08/17 150 lb (68 kg)              We Performed the Following     HCG qualitative urine     Progesterone     US OB < 14 Weeks Single     US OB Transvaginal Only          Today's Medication Changes          These changes are accurate as of: 6/9/17 11:54 AM.  If you have any questions, ask your nurse or doctor.               Start taking these medicines.        Dose/Directions    progesterone 100 MG capsule   Commonly known as:  PROMETRIUM   Used for:  Low serum progesterone, Encounter for supervision of other normal pregnancy in first trimester   Started by:  Astrid Romero MD        Dose:  100 mg   Take 1 capsule (100 mg) by mouth At Bedtime And place 1 capsule vaginally at bedtime   Quantity:  180 capsule   Refills:  2            Where to get your medicines      These medications were sent to [id number] - [place] - [address] [place] [facility] [place] [facility] [address]  [address], [place] [address]     Phone:  [phone]     progesterone 100 MG capsule                Primary Care Provider Office Phone # Fax #    Astrid Romero [phone]       [facility] [address]  [place] [address]        Thank you!     Thank you for choosing [facility]  for your care. Our goal is always to provide you with excellent care. Hearing back from our patients is one way we can continue to improve our services. Please take a few minutes to complete the written survey that  you may receive in the mail after your visit with us. Thank you!             Your Updated Medication List - Protect others around you: Learn how to safely use, store and throw away your medicines at www.disposemymeds.org.          This list is accurate as of: 6/9/17 11:55 AM.  Always use your most recent med list.                   Brand Name Dispense Instructions for use    ARMOUR THYROID 30 MG tablet   Generic drug:  thyroid     90 tablet    TAKE 1 TABLET BY MOUTH EVERY DAY       ASHWAGANDHA PO      Take by mouth At Bedtime       CHILDRENS MULTIVITAMIN PO      Take 2 tablets by mouth daily       ferrous sulfate 325 (65 FE) MG tablet    IRON     Take 325 mg by mouth At Bedtime       HYDROcodone-acetaminophen 5-325 MG per tablet    NORCO    20 tablet    Take 1 tablet by mouth 2 times daily as needed for moderate to severe pain       PANTOPRAZOLE SODIUM PO      Take 40 mg by mouth Unsure of dose but is taking one pill in the morning       progesterone 100 MG capsule    PROMETRIUM    180 capsule    Take 1 capsule (100 mg) by mouth At Bedtime And place 1 capsule vaginally at bedtime       ranitidine 300 MG tablet    ZANTAC    30 tablet    TAKE 1 TABLET(300 MG) BY MOUTH AT BEDTIME       * sertraline 25 MG tablet    ZOLOFT    90 tablet    TAKE 1 TABLET BY MOUTH EVERY DAY ALONG WITH THE 50MG TABLET       * sertraline 50 MG tablet    ZOLOFT    30 tablet    TAKE 1 TABLET BY MOUTH EVERY DAY       VITAMIN D3 PO      Take 1 tablet by mouth daily       * Notice:  This list has 2 medication(s) that are the same as other medications prescribed for you. Read the directions carefully, and ask your doctor or other care provider to review them with you.

## 2017-06-09 NOTE — PROGRESS NOTES
SUBJECTIVE:                                                    Deanne Yee is a 41 year old female who presents to clinic today for the following health issues:        Confirm pregnancy       Duration: LMP 5/8/17    Description (location/character/radiation): n/a    Intensity:  mild    Accompanying signs and symptoms: fatigue, nausea    History (similar episodes/previous evaluation): positive home pregnancy test    Precipitating or alleviating factors: None    Therapies tried and outcome: None     Problem list and histories reviewed & adjusted, as indicated.  Additional history: as documented    Current Outpatient Prescriptions   Medication Sig Dispense Refill     progesterone (PROMETRIUM) 100 MG capsule Take 1 capsule (100 mg) by mouth At Bedtime And place 1 capsule vaginally at bedtime 180 capsule 2     ferrous sulfate (IRON) 325 (65 FE) MG tablet Take 325 mg by mouth At Bedtime       ASHWAGANDHA PO Take by mouth At Bedtime       ranitidine (ZANTAC) 300 MG tablet TAKE 1 TABLET(300 MG) BY MOUTH AT BEDTIME 30 tablet 9     PANTOPRAZOLE SODIUM PO Take 40 mg by mouth Unsure of dose but is taking one pill in the morning        sertraline (ZOLOFT) 50 MG tablet TAKE 1 TABLET BY MOUTH EVERY DAY 30 tablet 3     Cholecalciferol (VITAMIN D3 PO) Take 1 tablet by mouth daily       HYDROcodone-acetaminophen (NORCO) 5-325 MG per tablet Take 1 tablet by mouth 2 times daily as needed for moderate to severe pain (Patient not taking: Reported on 5/8/2017) 20 tablet 0     sertraline (ZOLOFT) 25 MG tablet TAKE 1 TABLET BY MOUTH EVERY DAY ALONG WITH THE 50MG TABLET 90 tablet 1     ARMOUR THYROID 30 MG tablet TAKE 1 TABLET BY MOUTH EVERY DAY 90 tablet 0     Pediatric Multivit-Minerals-C (CHILDRENS MULTIVITAMIN PO) Take 2 tablets by mouth daily       Labs reviewed in EPIC    ROS:  Constitutional, HEENT, cardiovascular, pulmonary, gi and gu systems are negative, except as otherwise noted.    OBJECTIVE:                                                     /70  Pulse 71  Temp 97.8  F (36.6  C)  Resp 17  Wt 140 lb (63.5 kg)  SpO2 99%  BMI 23.3 kg/m2  Body mass index is 23.3 kg/(m^2).  GENERAL APPEARANCE: healthy, alert and no distress  RESP: lungs clear to auscultation - no rales, rhonchi or wheezes  CV: regular rates and rhythm, normal S1 S2, no S3 or S4 and no murmur, click or rub  PSYCH: mentation appears normal and affect normal/bright       ASSESSMENT/PLAN:                                                    1. Amenorrhea    - HCG qualitative urine    2. Encounter for supervision of other normal pregnancy in first trimester  Discussed taking baby ASA with her previous preeclampsia  Start progesterone and names of MDs in [place] were given  - US OB < 14 Weeks Single  - US OB Transvaginal Only  - progesterone (PROMETRIUM) 100 MG capsule; Take 1 capsule (100 mg) by mouth At Bedtime And place 1 capsule vaginally at bedtime  Dispense: 180 capsule; Refill: 2    3. Low serum progesterone    - Progesterone  - progesterone (PROMETRIUM) 100 MG capsule; Take 1 capsule (100 mg) by mouth At Bedtime And place 1 capsule vaginally at bedtime  Dispense: 180 capsule; Refill: 2    Patient was agreeable to this plan and had no further questions.  See Patient Instructions    Astrid Romero MD  [facility]

## 2017-06-13 LAB — PROGEST SERPL-MCNC: 24.4 NG/ML

## 2017-06-26 ENCOUNTER — HOSPITAL ENCOUNTER (OUTPATIENT)
Dept: ULTRASOUND IMAGING | Facility: HOSPITAL | Age: 42
Discharge: HOME | End: 2017-06-26
Attending: FAMILY MEDICINE | Admitting: FAMILY MEDICINE
Payer: COMMERCIAL

## 2017-06-26 PROCEDURE — 76801 OB US < 14 WKS SINGLE FETUS: CPT | Mod: TC

## 2017-06-26 PROCEDURE — 76817 TRANSVAGINAL US OBSTETRIC: CPT | Mod: TC

## 2017-07-09 ENCOUNTER — HOSPITAL ENCOUNTER (EMERGENCY)
Facility: HOSPITAL | Age: 42
Discharge: HOME | End: 2017-07-09
Payer: COMMERCIAL

## 2017-07-09 VITALS
TEMPERATURE: 98 F | RESPIRATION RATE: 16 BRPM | SYSTOLIC BLOOD PRESSURE: 114 MMHG | DIASTOLIC BLOOD PRESSURE: 71 MMHG | OXYGEN SATURATION: 98 % | HEART RATE: 75 BPM

## 2017-07-09 DIAGNOSIS — K21.9 GASTROESOPHAGEAL REFLUX DISEASE, ESOPHAGITIS PRESENCE NOT SPECIFIED: ICD-10-CM

## 2017-07-09 LAB
ALBUMIN SERPL-MCNC: 3.7 G/DL (ref 3.4–5)
ALBUMIN UR-MCNC: NEGATIVE MG/DL
ALP SERPL-CCNC: 51 U/L (ref 40–150)
ALT SERPL W P-5'-P-CCNC: 25 U/L (ref 0–50)
AMORPH CRY #/AREA URNS HPF: ABNORMAL /HPF
ANION GAP SERPL CALCULATED.3IONS-SCNC: 8 MMOL/L (ref 3–14)
APPEARANCE UR: ABNORMAL
AST SERPL W P-5'-P-CCNC: 17 U/L (ref 0–45)
B-HCG SERPL-ACNC: ABNORMAL IU/L (ref 0–5)
BACTERIA #/AREA URNS HPF: ABNORMAL /HPF
BASOPHILS # BLD AUTO: 0 10E9/L (ref 0–0.2)
BASOPHILS NFR BLD AUTO: 0.2 %
BILIRUB SERPL-MCNC: 0.6 MG/DL (ref 0.2–1.3)
BILIRUB UR QL STRIP: NEGATIVE
BUN SERPL-MCNC: 5 MG/DL (ref 7–30)
CALCIUM SERPL-MCNC: 8.5 MG/DL (ref 8.5–10.1)
CHLORIDE SERPL-SCNC: 105 MMOL/L (ref 94–109)
CO2 SERPL-SCNC: 25 MMOL/L (ref 20–32)
COLOR UR AUTO: YELLOW
CREAT SERPL-MCNC: 0.5 MG/DL (ref 0.52–1.04)
D DIMER PPP DDU-MCNC: 2693 NG/ML D-DU (ref 0–300)
DIFFERENTIAL METHOD BLD: ABNORMAL
EOSINOPHIL # BLD AUTO: 0 10E9/L (ref 0–0.7)
EOSINOPHIL NFR BLD AUTO: 0 %
ERYTHROCYTE [DISTWIDTH] IN BLOOD BY AUTOMATED COUNT: 11.8 % (ref 10–15)
GFR SERPL CREATININE-BSD FRML MDRD: ABNORMAL ML/MIN/1.7M2
GLUCOSE SERPL-MCNC: 92 MG/DL (ref 70–99)
GLUCOSE UR STRIP-MCNC: NEGATIVE MG/DL
HCT VFR BLD AUTO: 34.6 % (ref 35–47)
HGB BLD-MCNC: 12.5 G/DL (ref 11.7–15.7)
HGB UR QL STRIP: NEGATIVE
IMM GRANULOCYTES # BLD: 0.1 10E9/L (ref 0–0.4)
IMM GRANULOCYTES NFR BLD: 0.5 %
KETONES UR STRIP-MCNC: 5 MG/DL
LEUKOCYTE ESTERASE UR QL STRIP: NEGATIVE
LIPASE SERPL-CCNC: 152 U/L (ref 73–393)
LYMPHOCYTES # BLD AUTO: 0.6 10E9/L (ref 0.8–5.3)
LYMPHOCYTES NFR BLD AUTO: 3.1 %
MCH RBC QN AUTO: 30.9 PG (ref 26.5–33)
MCHC RBC AUTO-ENTMCNC: 36.1 G/DL (ref 31.5–36.5)
MCV RBC AUTO: 86 FL (ref 78–100)
MONOCYTES # BLD AUTO: 0.9 10E9/L (ref 0–1.3)
MONOCYTES NFR BLD AUTO: 4.6 %
MUCOUS THREADS #/AREA URNS LPF: PRESENT /LPF
NEUTROPHILS # BLD AUTO: 16.9 10E9/L (ref 1.6–8.3)
NEUTROPHILS NFR BLD AUTO: 91.6 %
NITRATE UR QL: NEGATIVE
NRBC # BLD AUTO: 0 10*3/UL
NRBC BLD AUTO-RTO: 0 /100
PH UR STRIP: 7.5 PH (ref 4.7–8)
PLATELET # BLD AUTO: 257 10E9/L (ref 150–450)
POTASSIUM SERPL-SCNC: 3.3 MMOL/L (ref 3.4–5.3)
PROT SERPL-MCNC: 7.3 G/DL (ref 6.8–8.8)
RBC # BLD AUTO: 4.04 10E12/L (ref 3.8–5.2)
RBC #/AREA URNS AUTO: 3 /HPF (ref 0–2)
SODIUM SERPL-SCNC: 138 MMOL/L (ref 133–144)
SP GR UR STRIP: 1.01 (ref 1–1.03)
SQUAMOUS #/AREA URNS AUTO: 5 /HPF (ref 0–1)
TROPONIN I SERPL-MCNC: NORMAL UG/L (ref 0–0.04)
TSH SERPL DL<=0.05 MIU/L-ACNC: 1.88 MU/L (ref 0.4–4)
URN SPEC COLLECT METH UR: ABNORMAL
UROBILINOGEN UR STRIP-MCNC: NORMAL MG/DL (ref 0–2)
WBC # BLD AUTO: 18.4 10E9/L (ref 4–11)
WBC #/AREA URNS AUTO: 2 /HPF (ref 0–2)

## 2017-07-09 PROCEDURE — 25000128 H RX IP 250 OP 636

## 2017-07-09 PROCEDURE — 80053 COMPREHEN METABOLIC PANEL: CPT

## 2017-07-09 PROCEDURE — 84702 CHORIONIC GONADOTROPIN TEST: CPT

## 2017-07-09 PROCEDURE — 25000132 ZZH RX MED GY IP 250 OP 250 PS 637

## 2017-07-09 PROCEDURE — 83690 ASSAY OF LIPASE: CPT

## 2017-07-09 PROCEDURE — S0164 INJECTION PANTROPRAZOLE: HCPCS

## 2017-07-09 PROCEDURE — 36415 COLL VENOUS BLD VENIPUNCTURE: CPT

## 2017-07-09 PROCEDURE — 99285 EMERGENCY DEPT VISIT HI MDM: CPT

## 2017-07-09 PROCEDURE — 85025 COMPLETE CBC W/AUTO DIFF WBC: CPT

## 2017-07-09 PROCEDURE — 76801 OB US < 14 WKS SINGLE FETUS: CPT | Mod: TC

## 2017-07-09 PROCEDURE — 93005 ELECTROCARDIOGRAM TRACING: CPT

## 2017-07-09 PROCEDURE — 84443 ASSAY THYROID STIM HORMONE: CPT

## 2017-07-09 PROCEDURE — 99285 EMERGENCY DEPT VISIT HI MDM: CPT | Mod: 25

## 2017-07-09 PROCEDURE — 81001 URINALYSIS AUTO W/SCOPE: CPT

## 2017-07-09 PROCEDURE — 85379 FIBRIN DEGRADATION QUANT: CPT

## 2017-07-09 PROCEDURE — 93010 ELECTROCARDIOGRAM REPORT: CPT | Performed by: INTERNAL MEDICINE

## 2017-07-09 PROCEDURE — 25000125 ZZHC RX 250

## 2017-07-09 PROCEDURE — 96361 HYDRATE IV INFUSION ADD-ON: CPT

## 2017-07-09 PROCEDURE — 96374 THER/PROPH/DIAG INJ IV PUSH: CPT

## 2017-07-09 PROCEDURE — 84484 ASSAY OF TROPONIN QUANT: CPT

## 2017-07-09 RX ORDER — SODIUM CHLORIDE 9 MG/ML
1000 INJECTION, SOLUTION INTRAVENOUS CONTINUOUS
Status: DISCONTINUED | OUTPATIENT
Start: 2017-07-09 | End: 2017-07-09 | Stop reason: HOSPADM

## 2017-07-09 RX ORDER — PANTOPRAZOLE SODIUM 40 MG/10ML
INJECTION, POWDER, LYOPHILIZED, FOR SOLUTION INTRAVENOUS
Status: COMPLETED
Start: 2017-07-09 | End: 2017-07-09

## 2017-07-09 RX ADMIN — RANITIDINE HYDROCHLORIDE 150 MG: 150 TABLET, FILM COATED ORAL at 13:25

## 2017-07-09 RX ADMIN — SODIUM CHLORIDE 1000 ML: 9 INJECTION, SOLUTION INTRAVENOUS at 10:09

## 2017-07-09 RX ADMIN — SODIUM CHLORIDE 1000 ML: 9 INJECTION, SOLUTION INTRAVENOUS at 11:13

## 2017-07-09 RX ADMIN — PANTOPRAZOLE SODIUM 40 MG: 40 INJECTION, POWDER, FOR SOLUTION INTRAVENOUS at 12:20

## 2017-07-09 RX ADMIN — LIDOCAINE HYDROCHLORIDE 30 ML: 20 SOLUTION ORAL; TOPICAL at 13:26

## 2017-07-09 ASSESSMENT — ENCOUNTER SYMPTOMS
NECK STIFFNESS: 0
EYE REDNESS: 0
APPETITE CHANGE: 1
DIARRHEA: 1
DIFFICULTY URINATING: 0
HEADACHES: 0
SHORTNESS OF BREATH: 0
COLOR CHANGE: 0
VOMITING: 1
CONFUSION: 0
ABDOMINAL PAIN: 1
NERVOUS/ANXIOUS: 1
ARTHRALGIAS: 0
NAUSEA: 1
ACTIVITY CHANGE: 1
FEVER: 0

## 2017-07-09 NOTE — ED AVS SNAPSHOT
[facility]    [address] [address]    Phone:  [phone]                                       Deanne Yee   MRN: [id number]    Department:  [facility]   Date of Visit:  7/9/2017           After Visit Summary Signature Page     I have received my discharge instructions, and my questions have been answered. I have discussed any challenges I see with this plan with the nurse or doctor.    ..........................................................................................................................................  Patient/Patient Representative Signature      ..........................................................................................................................................  Patient Representative Print Name and Relationship to Patient    ..................................................               ................................................  Date                                            Time    ..........................................................................................................................................  Reviewed by Signature/Title    ...................................................              ..............................................  Date                                                            Time

## 2017-07-09 NOTE — DISCHARGE INSTRUCTIONS
See attached for home care  Rest, fluids  Avoid spicy foods  Over the counter PPI and h2 inhibitors (zantac, prilosec)  Tums, maalox, mylanta ok to take  F/u with PCP next week for recheck   Return to ED with worsening sx.   Discharge Instructions for Gastroesophageal Reflux Disease (GERD)  Gastroesophageal reflux disease (GERD) is a backflow of acid from the stomach into the swallowing tube (esophagus).  Home care  These home care steps can help you manage GERD:    Maintain a healthy weight. Get help to lose any extra pounds.    Avoid lying down after meals.    Avoid eating late at night.    Elevate the head of your bed by 6 inches. You can do this by placing wooden blocks or bed risers under the head of your bed.    Avoid wearing tight-fitting clothes.    Avoid foods that might irritate your stomach, such as the following:    Alcohol    Fat    Chocolate    Caffeine    Spearmint or peppermint    Talk to your healthcare provider if you are taking any of the following medicines. These medicines can make GERD symptoms worse:    Calcium channel blockers    Theophylline    Anticholinergic medicines, such as oxybutynin and benzatropine    Begin an exercise program. Ask your healthcare provider how to get started. You can benefit from simple activities, such as walking or gardening.    Break the smoking habit. Enroll in a stop-smoking program to improve your chances of success.    Limit alcohol intake to no more than 2 drinks a day.    Take your medicines exactly as directed. Don t skip doses.    Avoid over-the-counter nonsteroidal anti-inflammatory medicines, such as aspirin and ibuprofen, unless recommended by your healthcare provider for certain conditions.     If possible, avoid nitrates (heart medicines, such as nitroglycerin and isosorbide dinitrate ).  Follow-up care  Make a follow-up appointment as directed by our staff.     When to call the healthcare provider  Call your healthcare provider immediately if you  have any of the following:    Trouble swallowing    Pain when swallowing    Feeling of food caught in your chest or throat    Pain in the neck, chest, or back    Heartburn that causes you to vomit    Vomiting blood    Black or tarry stools (from digested blood)    More saliva (watering of the mouth) than usual    Weight loss of more than 3% to 5% of your total body weight in a month    Hoarseness or sore throat that won t go away    Choking, coughing, or wheezing   Date Last Reviewed: 7/1/2016 2000-2017 [employer]. [address] [place] [id number]. All rights reserved. This information is not intended as a substitute for professional medical care. Always follow your healthcare professional's instructions.

## 2017-07-09 NOTE — ED PROVIDER NOTES
"  History     Chief Complaint   Patient presents with     Abdominal Pain     Began at midnight vomited times once since then right afyter she took a Tums. Diarrhea stool times 4, no blood. Has episode of elevatd D-Dimer Meng did not give her a dx.     Nausea, Vomiting, & Diarrhea     The history is provided by the patient. No  was used.     Deanne Yee is a 42 year old female with hx of elevated d dimer of unknown etiology, , 9 weeks in gestation, recent c-diff, presents to ED via private car accompanied by , for evaluation of severe mid epigastric pain, nausea, vomiting, diarrhea. Onset of sx at midnight last night, progressively worsening throughout the night. Rates pain 9 on 1-10 scale, \"ball of pressure\" intermittent sharp stabbing pain. Denies fever, reports to 'cold sweats'. Denies dysuria, no vaginal discharge, bleeding.     I have reviewed the Medications, Allergies, Past Medical and Surgical History, and Social History in the Epic system.    Allergies: No Known Allergies      No current facility-administered medications on file prior to encounter.   Current Outpatient Prescriptions on File Prior to Encounter:  progesterone (PROMETRIUM) 100 MG capsule Take 1 capsule (100 mg) by mouth At Bedtime And place 1 capsule vaginally at bedtime   ferrous sulfate (IRON) 325 (65 FE) MG tablet Take 325 mg by mouth At Bedtime   ranitidine (ZANTAC) 300 MG tablet TAKE 1 TABLET(300 MG) BY MOUTH AT BEDTIME   PANTOPRAZOLE SODIUM PO Take 40 mg by mouth Unsure of dose but is taking one pill in the morning    Cholecalciferol (VITAMIN D3 PO) Take 1 tablet by mouth daily   sertraline (ZOLOFT) 25 MG tablet TAKE 1 TABLET BY MOUTH EVERY DAY ALONG WITH THE 50MG TABLET   ARMOUR THYROID 30 MG tablet TAKE 1 TABLET BY MOUTH EVERY DAY   Pediatric Multivit-Minerals-C (CHILDRENS MULTIVITAMIN PO) Take 2 tablets by mouth daily   ASHWAGANDHA PO Take by mouth At Bedtime   sertraline (ZOLOFT) 50 MG tablet TAKE " "1 TABLET BY MOUTH EVERY DAY   HYDROcodone-acetaminophen (NORCO) 5-325 MG per tablet Take 1 tablet by mouth 2 times daily as needed for moderate to severe pain (Patient not taking: Reported on 5/8/2017)       Patient Active Problem List   Diagnosis     Dysplasia of cervix     Adjustment disorder with depressed mood     Other specified hypothyroidism     Migraine with aura and without status migrainosus, not intractable     Elevated d-dimer     Abdominal pain, generalized     Chest wall pain     Closed fracture of multiple ribs of right side with routine healing, subsequent encounter     Night sweats     Chronic sinusitis, unspecified location       Past Surgical History:   Procedure Laterality Date     DENTAL SURGERY      wisdom teeth removal     Loop electrosurgical excision procedure  2006       Social History   Substance Use Topics     Smoking status: Never Smoker     Smokeless tobacco: Never Used     Alcohol use 0.0 oz/week     0 Standard drinks or equivalent per week      Comment: 1 beer daily       Most Recent Immunizations   Administered Date(s) Administered     Rhogam 04/15/2016     TDAP Vaccine (Boostrix) 02/26/2016     Tdap (Adacel,Boostrix) 01/12/2012       BMI: Estimated body mass index is 23.3 kg/(m^2) as calculated from the following:    Height as of 5/30/17: 1.651 m (5' 5\").    Weight as of 6/9/17: 63.5 kg (140 lb).      Review of Systems   Constitutional: Positive for activity change and appetite change. Negative for fever.   HENT: Negative for congestion.    Eyes: Negative for redness.   Respiratory: Negative for shortness of breath.    Cardiovascular: Negative for chest pain.   Gastrointestinal: Positive for abdominal pain, diarrhea, nausea and vomiting.   Genitourinary: Negative for difficulty urinating.   Musculoskeletal: Negative for arthralgias and neck stiffness.   Skin: Negative for color change.   Neurological: Negative for headaches.   Psychiatric/Behavioral: Negative for confusion. The " patient is nervous/anxious.        Physical Exam   BP: 103/69  Heart Rate: 66  Temp: 98.4  F (36.9  C)  Resp: 18  SpO2: 98 %  Physical Exam   Constitutional: She is oriented to person, place, and time. No distress.   Cardiovascular: Normal rate and regular rhythm.    Pulmonary/Chest: Effort normal. No respiratory distress.   Abdominal: Soft. Bowel sounds are normal. She exhibits no distension. There is tenderness. There is no rebound.   Musculoskeletal: Normal range of motion.   Neurological: She is alert and oriented to person, place, and time.   Skin: Skin is warm and dry. She is not diaphoretic.   Psychiatric: Her speech is normal. Her mood appears anxious.   Nursing note and vitals reviewed.      ED Course     Procedures        Labs Ordered and Resulted from Time of ED Arrival Up to the Time of Departure from the ED   UA MACROSCOPIC WITH REFLEX TO MICRO AND CULTURE - Abnormal; Notable for the following:        Result Value    Ketones Urine 5 (*)     RBC Urine 3 (*)     Bacteria Urine None (*)     Squamous Epithelial /HPF Urine 5 (*)     Mucous Urine Present (*)     Amorphous Crystals Few (*)     All other components within normal limits   CBC WITH PLATELETS DIFFERENTIAL - Abnormal; Notable for the following:     WBC 18.4 (*)     Hematocrit 34.6 (*)     Absolute Neutrophil 16.9 (*)     Absolute Lymphocytes 0.6 (*)     All other components within normal limits   COMPREHENSIVE METABOLIC PANEL - Abnormal; Notable for the following:     Potassium 3.3 (*)     Urea Nitrogen 5 (*)     Creatinine 0.50 (*)     All other components within normal limits   D-DIMER (FV RANGE) - Abnormal; Notable for the following:     D-Dimer ng/mL 2693 (*)     All other components within normal limits   HCG QUANTITATIVE PREGNANCY - Abnormal; Notable for the following:     HCG Quantitative Serum 81313 (*)     All other components within normal limits   LIPASE   TROPONIN I   TSH       Assessments & Plan (with Medical Decision Making)   Pt  presents with acute onset epigastric pain, 9 weeks in gestation, hx of elevated d dimer of unknown etiology, hx of GERD. VSS, NAD, Exam as above. IV fluids and IV Protonix administered. Labs and ECG as above. WBC likely pregnancy induced, no infection concerns. D-Dimer is lower than previous. GI cocktail given with improved but continued sx.   Consulted with Dr. Jansen, who recommend OB US and continue with h2 inhibitors and PPI. US normal per tech.   Findings consistent with GERD. After 4 hour observation, sx improving, pt felt comfortable with discharge to home and f/u with PCP next week.    Epic discharge instructions given. Pt discharged in stable condition.     I have reviewed the nursing notes.    I have reviewed the findings, diagnosis, plan and need for follow up with the patient.    Discharge Medication List as of 7/9/2017  2:19 PM          Final diagnoses:   Gastroesophageal reflux disease, esophagitis presence not specified     See attached for home care  Rest, fluids  Avoid spicy foods  Over the counter PPI and h2 inhibitors (zantac, prilosec)  Tums, maalox, mylanta ok to take  F/u with PCP next week for recheck   Return to ED with worsening sx.     7/9/2017   [facility]     Elizabet Hanks APRN [facility]  07/10/17 0859

## 2017-07-09 NOTE — ED NOTES
Pt to be discharge home.  Pt to use PTC medications.  VSS.  Verbalized understanding of all discharge instructions.

## 2017-07-09 NOTE — ED AVS SNAPSHOT
[facility]    [address]    [facility] [address]    Phone:  [phone]                                       Deanne Yee   MRN: [id number]    Department:  [facility]   Date of Visit:  7/9/2017           Patient Information     Date Of Birth          1975        Your diagnoses for this visit were:     Gastroesophageal reflux disease, esophagitis presence not specified        You were seen by Elizabet Hanks APRN FNP.      Follow-up Information     Follow up with Astrid Romero MD In 1 day.    Specialty:  Family Practice    Why:  recheck    Contact information:    [facility]  [address]  [place] [address] [phone]          Follow up with [facility].    Specialty:  EMERGENCY MEDICINE    Why:  As needed, If symptoms worsen    Contact information:    [address] [address] [phone]    Additional information:    From [facility]: Take [address]. Turn left at [address]. Turn left at the first stoplight on [address]. At the first stop sign, take a right onto [address]. Take a left into the parking lot and continue through until you reach the North enterance of the building.       From [place]: Take [address]. Take the [address] ramp towards [place]. Turn left onto [address]. Take a slight right onto [address]. Turn left at the first stoplight on [address]. At the first stop sign, take a right onto [address]. Take a left into the parking lot and continue through until you reach the North enterance of the building.       From [place]: Take [address]. Take a right at [address]. At the first stop sign, take a right onto [address]. Take a left into the parking lot and continue through until you reach the North enterance of the building.         Discharge Instructions         See attached for home care  Rest,  fluids  Avoid spicy foods  Over the counter PPI and h2 inhibitors (zantac, prilosec)  Tums, maalox, mylanta ok to take  F/u with PCP next week for recheck   Return to ED with worsening sx.   Discharge Instructions for Gastroesophageal Reflux Disease (GERD)  Gastroesophageal reflux disease (GERD) is a backflow of acid from the stomach into the swallowing tube (esophagus).  Home care  These home care steps can help you manage GERD:    Maintain a healthy weight. Get help to lose any extra pounds.    Avoid lying down after meals.    Avoid eating late at night.    Elevate the head of your bed by 6 inches. You can do this by placing wooden blocks or bed risers under the head of your bed.    Avoid wearing tight-fitting clothes.    Avoid foods that might irritate your stomach, such as the following:    Alcohol    Fat    Chocolate    Caffeine    Spearmint or peppermint    Talk to your healthcare provider if you are taking any of the following medicines. These medicines can make GERD symptoms worse:    Calcium channel blockers    Theophylline    Anticholinergic medicines, such as oxybutynin and benzatropine    Begin an exercise program. Ask your healthcare provider how to get started. You can benefit from simple activities, such as walking or gardening.    Break the smoking habit. Enroll in a stop-smoking program to improve your chances of success.    Limit alcohol intake to no more than 2 drinks a day.    Take your medicines exactly as directed. Don t skip doses.    Avoid over-the-counter nonsteroidal anti-inflammatory medicines, such as aspirin and ibuprofen, unless recommended by your healthcare provider for certain conditions.     If possible, avoid nitrates (heart medicines, such as nitroglycerin and isosorbide dinitrate ).  Follow-up care  Make a follow-up appointment as directed by our staff.     When to call the healthcare provider  Call your healthcare provider immediately if you have any of the following:    Trouble  swallowing    Pain when swallowing    Feeling of food caught in your chest or throat    Pain in the neck, chest, or back    Heartburn that causes you to vomit    Vomiting blood    Black or tarry stools (from digested blood)    More saliva (watering of the mouth) than usual    Weight loss of more than 3% to 5% of your total body weight in a month    Hoarseness or sore throat that won t go away    Choking, coughing, or wheezing   Date Last Reviewed: 7/1/2016 2000-2017 [employer]. [address] [place] [address]. All rights reserved. This information is not intended as a substitute for professional medical care. Always follow your healthcare professional's instructions.             Review of your medicines      Our records show that you are taking the medicines listed below. If these are incorrect, please call your family doctor or clinic.        Dose / Directions Last dose taken    JENNIFER THYROID 30 MG tablet   Quantity:  90 tablet   Generic drug:  thyroid        TAKE 1 TABLET BY MOUTH EVERY DAY   Refills:  0        ASHWAGANDHA PO        Take by mouth At Bedtime   Refills:  0        CHILDRENS MULTIVITAMIN PO   Dose:  2 tablet        Take 2 tablets by mouth daily   Refills:  0        ferrous sulfate 325 (65 FE) MG tablet   Commonly known as:  IRON   Dose:  325 mg   Indication:  Iron Deficiency        Take 325 mg by mouth At Bedtime   Refills:  0        HYDROcodone-acetaminophen 5-325 MG per tablet   Commonly known as:  NORCO   Dose:  1 tablet   Quantity:  20 tablet        Take 1 tablet by mouth 2 times daily as needed for moderate to severe pain   Refills:  0        PANTOPRAZOLE SODIUM PO   Dose:  40 mg        Take 40 mg by mouth Unsure of dose but is taking one pill in the morning   Refills:  0        progesterone 100 MG capsule   Commonly known as:  PROMETRIUM   Dose:  100 mg   Quantity:  180 capsule        Take 1 capsule (100 mg) by mouth At Bedtime And place 1 capsule vaginally at  bedtime   Refills:  2        ranitidine 300 MG tablet   Commonly known as:  ZANTAC   Quantity:  30 tablet        TAKE 1 TABLET(300 MG) BY MOUTH AT BEDTIME   Refills:  9        * sertraline 25 MG tablet   Commonly known as:  ZOLOFT   Quantity:  90 tablet        TAKE 1 TABLET BY MOUTH EVERY DAY ALONG WITH THE 50MG TABLET   Refills:  1        * sertraline 50 MG tablet   Commonly known as:  ZOLOFT   Quantity:  30 tablet        TAKE 1 TABLET BY MOUTH EVERY DAY   Refills:  3        VITAMIN D3 PO   Dose:  1 tablet        Take 1 tablet by mouth daily   Refills:  0        * Notice:  This list has 2 medication(s) that are the same as other medications prescribed for you. Read the directions carefully, and ask your doctor or other care provider to review them with you.            Procedures and tests performed during your visit     CBC with platelets differential    Comprehensive metabolic panel    D-Dimer (FV Range)    EKG 12-lead, tracing only    HCG quantitative pregnancy    Lipase    TSH    Troponin I    UA reflex to Microscopic and Culture    US OB < 14 Weeks Single      Orders Needing Specimen Collection     Ordered          07/09/17 1013  Clostridium difficile toxin B PCR - ROUTINE, Prio: Routine, Needs to be Collected     Scheduled Task Status   07/09/17 1014 Collect Clostridium difficile toxin B PCR Open   Order Class:  PCU Collect                07/09/17 1013  Stool culture SSCE - STAT, Prio: STAT, Needs to be Collected     Scheduled Task Status   07/09/17 1014 Collect Stool culture SSCE Open   Order Class:  PCU Collect                07/09/17 1013  Ova and Parasite Screen - STAT, Prio: STAT, Needs to be Collected     Scheduled Task Status   07/09/17 1014 Collect Ova and Parasite Screen Open   Order Class:  PCU Collect                  Pending Results     Date and Time Order Name Status Description    7/9/2017 1236 US OB < 14 Weeks Single In process             Pending Culture Results     No orders found from  "2017 to 7/10/2017.            Thank you for choosing [facility]       Thank you for choosing [facility] for your care. Our goal is always to provide you with excellent care. Hearing back from our patients is one way we can continue to improve our services. Please take a few minutes to complete the written survey that you may receive in the mail after you visit with us. Thank you!        [employer] Information     [employer] lets you send messages to your doctor, view your test results, renew your prescriptions, schedule appointments and more. To sign up, go to [email or web address] . Click on \"Log in\" on the left side of the screen, which will take you to the Welcome page. Then click on \"Sign up Now\" on the right side of the page.     You will be asked to enter the access code listed below, as well as some personal information. Please follow the directions to create your username and password.     Your access code is: [email or web address]  Expires: 2017  7:08 PM     Your access code will  in 90 days. If you need help or a new code, please call your [facility] clinic or [phone].        Care EveryWhere ID     This is your Care EveryWhere ID. This could be used by other organizations to access your [facility] medical records  [id number]        Equal Access to Services     EMILY KELLY AH: Susan Terry, waaxda luqadaha, qaybta kaalmada norman, glynn espinosa. So [facility] [phone].    ATENCIÓN: Si habla español, tiene a griggs disposición servicios gratuitos de asistencia lingüística. Llame al [phone].    We comply with applicable federal civil rights laws and [place] laws. We do not discriminate on the basis of race, color, national origin, age, disability sex, sexual orientation or gender identity.            After Visit Summary       This is your record. Keep this with you and show to your community pharmacist(s) and doctor(s) at your next visit.                  "

## 2017-07-09 NOTE — ED NOTES
Pt with epigastric pain since midnight sharp and through to back.  vomitted once. Diarrhea 3-4x   Hx of elevated D dimer.

## 2017-07-20 DIAGNOSIS — R79.89 LOW SERUM PROGESTERONE: ICD-10-CM

## 2017-07-20 LAB — PROGEST SERPL-MCNC: 32 NG/ML

## 2017-07-20 PROCEDURE — 99000 SPECIMEN HANDLING OFFICE-LAB: CPT | Performed by: FAMILY MEDICINE

## 2017-07-20 PROCEDURE — 84144 ASSAY OF PROGESTERONE: CPT | Mod: 90 | Performed by: FAMILY MEDICINE

## 2017-07-20 PROCEDURE — 36415 COLL VENOUS BLD VENIPUNCTURE: CPT | Performed by: FAMILY MEDICINE

## 2017-08-30 DIAGNOSIS — F43.21 ADJUSTMENT DISORDER WITH DEPRESSED MOOD: ICD-10-CM

## 2017-08-31 RX ORDER — SERTRALINE HYDROCHLORIDE 25 MG/1
TABLET, FILM COATED ORAL
Qty: 90 TABLET | Refills: 0 | Status: SHIPPED | OUTPATIENT
Start: 2017-08-31 | End: 2017-12-11

## 2017-09-01 NOTE — TELEPHONE ENCOUNTER
Pt is calling requesting this medication be refilled today, she did have issues with the ordering number with [facility]. Message sent to nurse

## 2017-09-14 ENCOUNTER — TELEPHONE (OUTPATIENT)
Dept: FAMILY MEDICINE | Facility: OTHER | Age: 42
End: 2017-09-14

## 2017-09-14 NOTE — TELEPHONE ENCOUNTER
Pt is calling wanting to speak directly with Dr. Romero pt needs some advice does not want to talk to nurse.

## 2017-09-18 NOTE — TELEPHONE ENCOUNTER
Patient got a prescription for prednisone for her allergies and wants to know if it is safe to take? She is nervous because it is an aggressive drug. Please advise.

## 2017-09-18 NOTE — TELEPHONE ENCOUNTER
It would be fine to take -- i'm assuming she already tried the usual allergy meds  Anti inflammatory diet would help also

## 2017-11-03 DIAGNOSIS — E03.8 OTHER SPECIFIED HYPOTHYROIDISM: Primary | ICD-10-CM

## 2017-11-03 NOTE — TELEPHONE ENCOUNTER
[facility] Thyroid       Last Written Prescription Date: 2/1/17  Last Fill Quantity: 90,  # refills: 0   Last Office Visit with [facility] or [facility] prescribing provider: 6/9/17

## 2017-11-08 RX ORDER — THYROID 30 MG/1
30 TABLET ORAL DAILY
Qty: 90 TABLET | Refills: 0 | Status: SHIPPED | OUTPATIENT
Start: 2017-11-08 | End: 2019-10-22

## 2017-11-08 NOTE — TELEPHONE ENCOUNTER
PCP Dr. Romero out, routed to Dr. Terrazas. When reviewing thyroid medication appears to be dosing changes in the past. Pharmacy is requesting 30 mg tab.   Thank you

## 2017-11-26 ENCOUNTER — HEALTH MAINTENANCE LETTER (OUTPATIENT)
Age: 42
End: 2017-11-26

## 2017-12-11 DIAGNOSIS — F43.21 ADJUSTMENT DISORDER WITH DEPRESSED MOOD: ICD-10-CM

## 2017-12-13 NOTE — TELEPHONE ENCOUNTER
Zoloft 25mg       Last Written Prescription Date: 8/31/2017  Last Fill Quantity: 90,  # refills: 0   Last Office Visit with [facility], [facility] or [facility] prescribing provider: 6/09/2017                                             Zoloft 50mg       Last Written Prescription Date: 8/31/2017  Last Fill Quantity: 90,  # refills: 0   Last Office Visit with [facility], [facility] or [facility] prescribing provider: 6/09/2017

## 2017-12-14 RX ORDER — SERTRALINE HYDROCHLORIDE 25 MG/1
TABLET, FILM COATED ORAL
Qty: 60 TABLET | Refills: 0 | Status: SHIPPED | OUTPATIENT
Start: 2017-12-14 | End: 2019-10-22

## 2018-02-03 ENCOUNTER — TRANSFERRED RECORDS (OUTPATIENT)
Dept: HEALTH INFORMATION MANAGEMENT | Facility: CLINIC | Age: 43
End: 2018-02-03

## 2018-02-11 ENCOUNTER — TRANSFERRED RECORDS (OUTPATIENT)
Dept: HEALTH INFORMATION MANAGEMENT | Facility: CLINIC | Age: 43
End: 2018-02-11

## 2018-07-19 LAB
CHOLEST SERPL-MCNC: 182 MG/DL
GLUCOSE SERPL-MCNC: 89 MG/DL (ref 70–99)
HDLC SERPL-MCNC: 66 MG/DL
LDLC SERPL CALC-MCNC: 95 MG/DL
TRIGL SERPL-MCNC: 107 MG/DL

## 2018-09-10 LAB — TSH SERPL-ACNC: 0.89 U[IU]/ML (ref 0.27–4.2)

## 2018-11-27 ENCOUNTER — HEALTH MAINTENANCE LETTER (OUTPATIENT)
Age: 43
End: 2018-11-27

## 2019-02-01 ENCOUNTER — TRANSFERRED RECORDS (OUTPATIENT)
Dept: HEALTH INFORMATION MANAGEMENT | Facility: CLINIC | Age: 44
End: 2019-02-01

## 2019-05-20 LAB — TSH SERPL-ACNC: 4.72 U[IU]/ML (ref 0.27–4.2)

## 2019-06-28 ENCOUNTER — TRANSFERRED RECORDS (OUTPATIENT)
Dept: HEALTH INFORMATION MANAGEMENT | Facility: CLINIC | Age: 44
End: 2019-06-28

## 2019-06-28 LAB — TSH SERPL-ACNC: 2.51 U[IU]/ML (ref 0.27–4.2)

## 2019-10-22 ENCOUNTER — OFFICE VISIT (OUTPATIENT)
Dept: FAMILY MEDICINE | Facility: OTHER | Age: 44
End: 2019-10-22
Attending: FAMILY MEDICINE
Payer: COMMERCIAL

## 2019-10-22 VITALS
TEMPERATURE: 97.5 F | HEIGHT: 66 IN | WEIGHT: 142 LBS | DIASTOLIC BLOOD PRESSURE: 60 MMHG | HEART RATE: 59 BPM | SYSTOLIC BLOOD PRESSURE: 108 MMHG | OXYGEN SATURATION: 98 % | BODY MASS INDEX: 22.82 KG/M2

## 2019-10-22 DIAGNOSIS — Z12.31 ENCOUNTER FOR SCREENING MAMMOGRAM FOR BREAST CANCER: ICD-10-CM

## 2019-10-22 DIAGNOSIS — H61.22 LEFT EAR IMPACTED CERUMEN: ICD-10-CM

## 2019-10-22 DIAGNOSIS — F43.21 ADJUSTMENT DISORDER WITH DEPRESSED MOOD: ICD-10-CM

## 2019-10-22 DIAGNOSIS — G44.209 TENSION HEADACHE: ICD-10-CM

## 2019-10-22 DIAGNOSIS — E03.9 ACQUIRED HYPOTHYROIDISM: Primary | ICD-10-CM

## 2019-10-22 LAB
ANION GAP SERPL CALCULATED.3IONS-SCNC: 4 MMOL/L (ref 3–14)
BASOPHILS # BLD AUTO: 0 10E9/L (ref 0–0.2)
BASOPHILS NFR BLD AUTO: 0.5 %
BUN SERPL-MCNC: 9 MG/DL (ref 7–30)
CALCIUM SERPL-MCNC: 8.5 MG/DL (ref 8.5–10.1)
CHLORIDE SERPL-SCNC: 106 MMOL/L (ref 94–109)
CO2 SERPL-SCNC: 28 MMOL/L (ref 20–32)
CREAT SERPL-MCNC: 0.81 MG/DL (ref 0.52–1.04)
DIFFERENTIAL METHOD BLD: NORMAL
EOSINOPHIL # BLD AUTO: 0 10E9/L (ref 0–0.7)
EOSINOPHIL NFR BLD AUTO: 0.2 %
ERYTHROCYTE [DISTWIDTH] IN BLOOD BY AUTOMATED COUNT: 12 % (ref 10–15)
GFR SERPL CREATININE-BSD FRML MDRD: 89 ML/MIN/{1.73_M2}
GLUCOSE SERPL-MCNC: 87 MG/DL (ref 70–99)
HCT VFR BLD AUTO: 40.8 % (ref 35–47)
HGB BLD-MCNC: 13.9 G/DL (ref 11.7–15.7)
IMM GRANULOCYTES # BLD: 0 10E9/L (ref 0–0.4)
IMM GRANULOCYTES NFR BLD: 0.3 %
LYMPHOCYTES # BLD AUTO: 1.5 10E9/L (ref 0.8–5.3)
LYMPHOCYTES NFR BLD AUTO: 24.2 %
MCH RBC QN AUTO: 30 PG (ref 26.5–33)
MCHC RBC AUTO-ENTMCNC: 34.1 G/DL (ref 31.5–36.5)
MCV RBC AUTO: 88 FL (ref 78–100)
MONOCYTES # BLD AUTO: 0.4 10E9/L (ref 0–1.3)
MONOCYTES NFR BLD AUTO: 6.3 %
NEUTROPHILS # BLD AUTO: 4.3 10E9/L (ref 1.6–8.3)
NEUTROPHILS NFR BLD AUTO: 68.5 %
NRBC # BLD AUTO: 0 10*3/UL
NRBC BLD AUTO-RTO: 0 /100
PLATELET # BLD AUTO: 352 10E9/L (ref 150–450)
POTASSIUM SERPL-SCNC: 4.2 MMOL/L (ref 3.4–5.3)
RBC # BLD AUTO: 4.63 10E12/L (ref 3.8–5.2)
SODIUM SERPL-SCNC: 138 MMOL/L (ref 133–144)
TSH SERPL DL<=0.005 MIU/L-ACNC: 2.12 MU/L (ref 0.4–4)
WBC # BLD AUTO: 6.2 10E9/L (ref 4–11)

## 2019-10-22 PROCEDURE — 36415 COLL VENOUS BLD VENIPUNCTURE: CPT | Performed by: FAMILY MEDICINE

## 2019-10-22 PROCEDURE — 99214 OFFICE O/P EST MOD 30 MIN: CPT | Mod: 25 | Performed by: FAMILY MEDICINE

## 2019-10-22 PROCEDURE — 85025 COMPLETE CBC W/AUTO DIFF WBC: CPT | Performed by: FAMILY MEDICINE

## 2019-10-22 PROCEDURE — 69209 REMOVE IMPACTED EAR WAX UNI: CPT | Performed by: FAMILY MEDICINE

## 2019-10-22 PROCEDURE — 80048 BASIC METABOLIC PNL TOTAL CA: CPT | Performed by: FAMILY MEDICINE

## 2019-10-22 PROCEDURE — 84443 ASSAY THYROID STIM HORMONE: CPT | Performed by: FAMILY MEDICINE

## 2019-10-22 RX ORDER — SERTRALINE HYDROCHLORIDE 100 MG/1
100 TABLET, FILM COATED ORAL DAILY
COMMUNITY
End: 2019-10-22

## 2019-10-22 RX ORDER — SERTRALINE HYDROCHLORIDE 100 MG/1
100 TABLET, FILM COATED ORAL DAILY
Qty: 90 TABLET | Refills: 3 | Status: SHIPPED | OUTPATIENT
Start: 2019-10-22 | End: 2019-11-25

## 2019-10-22 RX ORDER — LEVOTHYROXINE SODIUM 50 UG/1
TABLET ORAL
Refills: 0 | COMMUNITY
Start: 2019-07-15 | End: 2019-10-22

## 2019-10-22 RX ORDER — LEVOTHYROXINE SODIUM 50 UG/1
TABLET ORAL
Qty: 90 TABLET | Refills: 2 | Status: SHIPPED | OUTPATIENT
Start: 2019-10-22 | End: 2020-07-28

## 2019-10-22 ASSESSMENT — ANXIETY QUESTIONNAIRES
4. TROUBLE RELAXING: NEARLY EVERY DAY
3. WORRYING TOO MUCH ABOUT DIFFERENT THINGS: NOT AT ALL
6. BECOMING EASILY ANNOYED OR IRRITABLE: SEVERAL DAYS
GAD7 TOTAL SCORE: 6
5. BEING SO RESTLESS THAT IT IS HARD TO SIT STILL: MORE THAN HALF THE DAYS
IF YOU CHECKED OFF ANY PROBLEMS ON THIS QUESTIONNAIRE, HOW DIFFICULT HAVE THESE PROBLEMS MADE IT FOR YOU TO DO YOUR WORK, TAKE CARE OF THINGS AT HOME, OR GET ALONG WITH OTHER PEOPLE: NOT DIFFICULT AT ALL
1. FEELING NERVOUS, ANXIOUS, OR ON EDGE: NOT AT ALL
2. NOT BEING ABLE TO STOP OR CONTROL WORRYING: NOT AT ALL
7. FEELING AFRAID AS IF SOMETHING AWFUL MIGHT HAPPEN: NOT AT ALL

## 2019-10-22 ASSESSMENT — MIFFLIN-ST. JEOR: SCORE: 1315.86

## 2019-10-22 ASSESSMENT — PATIENT HEALTH QUESTIONNAIRE - PHQ9: SUM OF ALL RESPONSES TO PHQ QUESTIONS 1-9: 3

## 2019-10-22 ASSESSMENT — PAIN SCALES - GENERAL: PAINLEVEL: NO PAIN (1)

## 2019-10-22 NOTE — NURSING NOTE
"Chief Complaint   Patient presents with     Depression     Thyroid Problem       Initial /60   Pulse 59   Temp 97.5  F (36.4  C)   Ht 1.676 m (5' 6\")   Wt 64.4 kg (142 lb)   SpO2 98%   BMI 22.92 kg/m   Estimated body mass index is 22.92 kg/m  as calculated from the following:    Height as of this encounter: 1.676 m (5' 6\").    Weight as of this encounter: 64.4 kg (142 lb).  Medication Reconciliation: complete  Wilber Kevin LPN  "

## 2019-10-22 NOTE — PROGRESS NOTES
Subjective     Deanne Yee is a 44 year old female who presents to clinic today for the following health issues:    HPI   Depression Followup    How are you doing with your depression since your last visit? Improved     Are you having other symptoms that might be associated with depression? No    Have you had a significant life event?  No     Are you feeling anxious or having panic attacks?   No    Do you have any concerns with your use of alcohol or other drugs? No    Social History     Tobacco Use     Smoking status: Never Smoker     Smokeless tobacco: Never Used   Substance Use Topics     Alcohol use: Yes     Alcohol/week: 0.0 standard drinks     Comment: 1 beer daily     Drug use: No     PHQ 3/31/2017 4/18/2017 10/22/2019   PHQ-9 Total Score 3 9 3   Q9: Thoughts of better off dead/self-harm past 2 weeks Not at all Not at all Not at all     MATHEW-7 SCORE 8/9/2016 3/31/2017 10/22/2019   Total Score 2 1 6           Suicide Assessment Five-step Evaluation and Treatment (SAFE-T)  Hypothyroidism Follow-up      Since last visit, patient describes the following symptoms: Weight stable, no hair loss, no skin changes, no constipation, no loose stools        How many servings of fruits and vegetables do you eat daily?  2-3    On average, how many sweetened beverages do you drink each day (soda, juice, sweet tea, etc)?   0    How many days per week do you miss taking your medication? 0      C/o more frequent band like HA   Tylenol helps headaches - uses rarely   Increase stress in life   On computer more       C/o wet plugged feeling in left ear     Does not know when last pap 2 yrs maybe - done in [place]  and never had mammogram         Current Outpatient Medications   Medication Sig Dispense Refill     levothyroxine (SYNTHROID/LEVOTHROID) 50 MCG tablet TK 1 T PO QD  0     sertraline (ZOLOFT) 100 MG tablet Take 100 mg by mouth daily       No Known Allergies      Reviewed and updated as needed this visit by Provider      "    Review of Systems   ROS COMP: Constitutional, HEENT, cardiovascular, pulmonary, gi and gu systems are negative, except as otherwise noted.      Objective    /60   Pulse 59   Temp 97.5  F (36.4  C)   Ht 1.676 m (5' 6\")   Wt 64.4 kg (142 lb)   SpO2 98%   BMI 22.92 kg/m    Body mass index is 22.92 kg/m .  Physical Exam   GENERAL: healthy, alert and no distress  HENT: ear canals and TM's normal, nose and mouth without ulcers or lesions  NECK: no adenopathy, no asymmetry, masses, or scars and thyroid normal to palpation-- some posterior scalp and neck tenderness.    RESP: lungs clear to auscultation - no rales, rhonchi or wheezes  CV: regular rate and rhythm, normal S1 S2, no S3 or S4, no murmur, click or rub, no peripheral edema and peripheral pulses strong  ABDOMEN: soft, nontender, no hepatosplenomegaly, no masses and bowel sounds normal  MS: no gross musculoskeletal defects noted, no edema  SKIN: no suspicious lesions or rashes  NEURO: Normal strength and tone, mentation intact and speech normal  PSYCH: mentation appears normal, affect normal/bright    Results for orders placed or performed in visit on 10/22/19   CBC with platelets differential   Result Value Ref Range    WBC 6.2 4.0 - 11.0 10e9/L    RBC Count 4.63 3.8 - 5.2 10e12/L    Hemoglobin 13.9 11.7 - 15.7 g/dL    Hematocrit 40.8 35.0 - 47.0 %    MCV 88 78 - 100 fl    MCH 30.0 26.5 - 33.0 pg    MCHC 34.1 31.5 - 36.5 g/dL    RDW 12.0 10.0 - 15.0 %    Platelet Count 352 150 - 450 10e9/L    Diff Method Automated Method     % Neutrophils 68.5 %    % Lymphocytes 24.2 %    % Monocytes 6.3 %    % Eosinophils 0.2 %    % Basophils 0.5 %    % Immature Granulocytes 0.3 %    Nucleated RBCs 0 0 /100    Absolute Neutrophil 4.3 1.6 - 8.3 10e9/L    Absolute Lymphocytes 1.5 0.8 - 5.3 10e9/L    Absolute Monocytes 0.4 0.0 - 1.3 10e9/L    Absolute Eosinophils 0.0 0.0 - 0.7 10e9/L    Absolute Basophils 0.0 0.0 - 0.2 10e9/L    Abs Immature Granulocytes 0.0 0 - 0.4 " 10e9/L    Absolute Nucleated RBC 0.0    Basic metabolic panel   Result Value Ref Range    Sodium 138 133 - 144 mmol/L    Potassium 4.2 3.4 - 5.3 mmol/L    Chloride 106 94 - 109 mmol/L    Carbon Dioxide 28 20 - 32 mmol/L    Anion Gap 4 3 - 14 mmol/L    Glucose 87 70 - 99 mg/dL    Urea Nitrogen 9 7 - 30 mg/dL    Creatinine 0.81 0.52 - 1.04 mg/dL    GFR Estimate 89 >60 mL/min/[1.73_m2]    GFR Estimate If Black >90 >60 mL/min/[1.73_m2]    Calcium 8.5 8.5 - 10.1 mg/dL   TSH   Result Value Ref Range    TSH 2.12 0.40 - 4.00 mU/L             Assessment & Plan     1. Acquired hypothyroidism  Will check labs and rf meds accordantly.    - CBC with platelets differential  - Basic metabolic panel  - TSH    2. Adjustment disorder with depressed mood  Stable on meds - needs long term.  Has tried off - it failed   - sertraline (ZOLOFT) 100 MG tablet; Take 1 tablet (100 mg) by mouth daily  Dispense: 90 tablet; Refill: 3    3. Encounter for screening mammogram for breast cancer  Will order baseline mammogram.  Will do Px in spring by her 44 yo B-day   - MA Screening Digital Bilateral; Future    4. Left ear impacted cerumen  Ear wash done by nursing with good results   - REMOVE IMPACTED CERUMEN    5. Tension headache  Discussed. Work on body mechanics and work station.  Stress relief.  Symptomatic treatment was discussed along when patient should call and/or come back into the clinic or go to ER/Urgent care. All questions answered.              No follow-ups on file.    Arnav Lawrence MD  [facility]

## 2019-10-23 ASSESSMENT — ANXIETY QUESTIONNAIRES: GAD7 TOTAL SCORE: 6

## 2019-10-25 ENCOUNTER — ANCILLARY PROCEDURE (OUTPATIENT)
Dept: MAMMOGRAPHY | Facility: OTHER | Age: 44
End: 2019-10-25
Attending: FAMILY MEDICINE
Payer: COMMERCIAL

## 2019-10-25 DIAGNOSIS — Z12.31 ENCOUNTER FOR SCREENING MAMMOGRAM FOR BREAST CANCER: ICD-10-CM

## 2019-10-25 PROCEDURE — 77067 SCR MAMMO BI INCL CAD: CPT | Mod: TC

## 2019-11-25 ENCOUNTER — MYC MEDICAL ADVICE (OUTPATIENT)
Dept: FAMILY MEDICINE | Facility: OTHER | Age: 44
End: 2019-11-25

## 2019-11-25 DIAGNOSIS — F43.21 ADJUSTMENT DISORDER WITH DEPRESSED MOOD: ICD-10-CM

## 2019-11-25 RX ORDER — SERTRALINE HYDROCHLORIDE 100 MG/1
TABLET, FILM COATED ORAL
Qty: 135 TABLET | Refills: 3 | Status: SHIPPED | OUTPATIENT
Start: 2019-11-25 | End: 2020-08-19

## 2019-12-30 ENCOUNTER — MYC MEDICAL ADVICE (OUTPATIENT)
Dept: FAMILY MEDICINE | Facility: OTHER | Age: 44
End: 2019-12-30

## 2020-03-02 ENCOUNTER — HEALTH MAINTENANCE LETTER (OUTPATIENT)
Age: 45
End: 2020-03-02

## 2020-04-20 ENCOUNTER — MYC MEDICAL ADVICE (OUTPATIENT)
Dept: FAMILY MEDICINE | Facility: OTHER | Age: 45
End: 2020-04-20

## 2020-07-25 DIAGNOSIS — E03.9 ACQUIRED HYPOTHYROIDISM: ICD-10-CM

## 2020-07-28 RX ORDER — LEVOTHYROXINE SODIUM 50 UG/1
TABLET ORAL
Qty: 90 TABLET | Refills: 0 | Status: SHIPPED | OUTPATIENT
Start: 2020-07-28 | End: 2020-08-19

## 2020-08-19 ENCOUNTER — MYC MEDICAL ADVICE (OUTPATIENT)
Dept: FAMILY MEDICINE | Facility: OTHER | Age: 45
End: 2020-08-19

## 2020-08-19 DIAGNOSIS — F43.21 ADJUSTMENT DISORDER WITH DEPRESSED MOOD: ICD-10-CM

## 2020-08-19 DIAGNOSIS — E03.9 ACQUIRED HYPOTHYROIDISM: ICD-10-CM

## 2020-08-19 RX ORDER — LEVOTHYROXINE SODIUM 50 UG/1
TABLET ORAL
Qty: 90 TABLET | Refills: 0 | Status: SHIPPED | OUTPATIENT
Start: 2020-08-19 | End: 2020-11-04

## 2020-08-19 RX ORDER — SERTRALINE HYDROCHLORIDE 100 MG/1
TABLET, FILM COATED ORAL
Qty: 135 TABLET | Refills: 3 | Status: SHIPPED | OUTPATIENT
Start: 2020-08-19 | End: 2020-09-28

## 2020-08-25 DIAGNOSIS — Z12.31 OTHER SCREENING MAMMOGRAM: Primary | ICD-10-CM

## 2020-08-29 ENCOUNTER — HOSPITAL ENCOUNTER (EMERGENCY)
Facility: HOSPITAL | Age: 45
Discharge: HOME | End: 2020-08-29
Attending: FAMILY MEDICINE | Admitting: FAMILY MEDICINE
Payer: COMMERCIAL

## 2020-08-29 VITALS
SYSTOLIC BLOOD PRESSURE: 146 MMHG | OXYGEN SATURATION: 100 % | RESPIRATION RATE: 16 BRPM | TEMPERATURE: 97.7 F | BODY MASS INDEX: 23.32 KG/M2 | HEIGHT: 65 IN | WEIGHT: 140 LBS | HEART RATE: 82 BPM | DIASTOLIC BLOOD PRESSURE: 78 MMHG

## 2020-08-29 DIAGNOSIS — L03.211 CELLULITIS OF FACE: ICD-10-CM

## 2020-08-29 PROCEDURE — 99283 EMERGENCY DEPT VISIT LOW MDM: CPT | Mod: Z6 | Performed by: FAMILY MEDICINE

## 2020-08-29 PROCEDURE — 99283 EMERGENCY DEPT VISIT LOW MDM: CPT

## 2020-08-29 RX ORDER — CYCLOSPORINE 0.5 MG/ML
1 EMULSION OPHTHALMIC 2 TIMES DAILY
COMMUNITY
End: 2020-11-17

## 2020-08-29 RX ORDER — SULFAMETHOXAZOLE/TRIMETHOPRIM 800-160 MG
1 TABLET ORAL 2 TIMES DAILY
Qty: 14 TABLET | Refills: 0 | Status: SHIPPED | OUTPATIENT
Start: 2020-08-29 | End: 2020-09-05

## 2020-08-29 ASSESSMENT — MIFFLIN-ST. JEOR: SCORE: 1285.92

## 2020-08-29 NOTE — ED NOTES
Pt presents with c/o a painful rash that started on her Rt eyebrow on 8/24/20 and has since spread down the outside portion of the Rt side of her face and into her jaw. No OTC medications today.

## 2020-08-29 NOTE — ED AVS SNAPSHOT
[facility]  [address] [address]  Phone:  [phone]                                    Deanne Yee   MRN: [id number]    Department:  [facility]   Date of Visit:  8/29/2020           After Visit Summary Signature Page    I have received my discharge instructions, and my questions have been answered. I have discussed any challenges I see with this plan with the nurse or doctor.    ..........................................................................................................................................  Patient/Patient Representative Signature      ..........................................................................................................................................  Patient Representative Print Name and Relationship to Patient    ..................................................               ................................................  Date                                   Time    ..........................................................................................................................................  Reviewed by Signature/Title    ...................................................              ..............................................  Date                                               Time          22EPIC Rev 08/18

## 2020-08-29 NOTE — DISCHARGE INSTRUCTIONS
Please take medication as prescribed, drink plenty of fluids.  Visit your primary care provider 1 week if symptoms do not improve.  Return to ED if symptoms worsen or any concerns about your health.

## 2020-08-29 NOTE — ED PROVIDER NOTES
History     Chief Complaint   Patient presents with     Rash     painful rash Rt side of face started 8/2 and progressively worsened     HPI  Deanne Yee is a 45 year old female who presents with painful skin rash on her right eyebrow area which she developed for 5 days ago.  Patient denies any history of skin abrasion or skin color.  Her problem started suddenly as a skin itchiness which progressed to tenderness affected area.  No history of new food or new medication, she did not take any OTC medications for symptom control.  She also noticed palpable tender small bumps on the front of her right ear area.      Allergies:  No Known Allergies    Problem List:    Patient Active Problem List    Diagnosis Date Noted     Elevated d-dimer 04/10/2017     Priority: Medium     Abdominal pain, generalized 04/10/2017     Priority: Medium     Chest wall pain 04/10/2017     Priority: Medium     Closed fracture of multiple ribs of right side with routine healing, subsequent encounter 04/10/2017     Priority: Medium     Night sweats 04/10/2017     Priority: Medium     Chronic sinusitis, unspecified location 04/10/2017     Priority: Medium     Migraine with aura and without status migrainosus, not intractable 04/11/2016     Priority: Medium     Other specified hypothyroidism 02/26/2016     Priority: Medium     Adjustment disorder with depressed mood 11/19/2015     Priority: Medium     Dysplasia of cervix 01/12/2012     Priority: Medium     Problem list name updated by automated process. Provider to review          Past Medical History:    Past Medical History:   Diagnosis Date     Cervical dysplasia 01/12/2012     MATHEW (generalized anxiety disorder)      GERD (gastroesophageal reflux disease)      Hx of migraine headaches 01/12/2012     Hypothyroidism      Preeclampsia      Reactive airway disease 01/12/2012     S/P LEEP (loop electrosurgical excision procedure) 01/12/2012     Sleep Pattern Disturbance 01/25/2013       Past  "Surgical History:    Past Surgical History:   Procedure Laterality Date     DENTAL SURGERY      wisdom teeth removal     Loop electrosurgical excision procedure  2006       Family History:    Family History   Problem Relation Age of Onset     Glaucoma Mother      Other - See Comments Father         hyperlipidemia     Hypertension Father      Other - See Comments Maternal Grandfather         myocardial infarction     Cancer Paternal Grandmother         stomach     Cerebrovascular Disease Paternal Grandfather         CVA; x3     Other - See Comments Maternal Grandmother         myocardial infarction; cause of death       Social History:  Marital Status:   [2]  Social History     Tobacco Use     Smoking status: Never Smoker     Smokeless tobacco: Never Used   Substance Use Topics     Alcohol use: Yes     Alcohol/week: 0.0 standard drinks     Comment: 1 beer daily     Drug use: No        Medications:    cycloSPORINE (RESTASIS) 0.05 % ophthalmic emulsion  levothyroxine (SYNTHROID/LEVOTHROID) 50 MCG tablet  sertraline (ZOLOFT) 100 MG tablet  sulfamethoxazole-trimethoprim (BACTRIM DS) 800-160 MG tablet          Review of Systems  All systems reviewed and pertinent positives indicated in history of present illness otherwise negative.    Physical Exam   BP: 146/78  Pulse: 82  Temp: 97.7  F (36.5  C)  Resp: 16  Height: 165.1 cm (5' 5\")  Weight: 63.5 kg (140 lb)  SpO2: 100 %      Physical Exam  Vitals signs and nursing note reviewed. Exam conducted with a chaperone present.   Constitutional:       Appearance: Normal appearance. She is normal weight. She is not ill-appearing or diaphoretic.   HENT:      Head: Normocephalic.      Right Ear: Ear canal and external ear normal.      Left Ear: Ear canal and external ear normal.      Nose: Nose normal.      Mouth/Throat:      Mouth: Mucous membranes are moist.      Pharynx: No oropharyngeal exudate or posterior oropharyngeal erythema.   Eyes:      Extraocular Movements: " Extraocular movements intact.      Conjunctiva/sclera: Conjunctivae normal.      Pupils: Pupils are equal, round, and reactive to light.   Neck:      Musculoskeletal: Normal range of motion and neck supple. No neck rigidity or muscular tenderness.   Cardiovascular:      Rate and Rhythm: Normal rate and regular rhythm.      Pulses: Normal pulses.      Heart sounds: Normal heart sounds.   Pulmonary:      Breath sounds: Normal breath sounds.   Musculoskeletal: Normal range of motion.   Skin:     General: Skin is warm.      Capillary Refill: Capillary refill takes less than 2 seconds.      Findings: Rash present.      Comments: Small erythematosus rounded skin rash on her line, tender to palpation.  There are 2 palpable erythematosus rounded swollen  indurations just above right eyebrow area, no fluctuance, no drainage.  Anterior aspect of right ear with palpable enlarged tender lymph nodes.  Right neck with palpable tender lymph nodes.   Neurological:      General: No focal deficit present.      Mental Status: She is alert and oriented to person, place, and time.         ED Course        Procedures                   No results found for this or any previous visit (from the past 24 hour(s)).    Medications - No data to display    Assessments & Plan (with Medical Decision Making)     45-year-old female who presents with concern about her skin nurse which she developed about 4 5 days ago and became progressively worse.  She denies history of new medication or new food.  She complains of aching of her affected area of her right forehead.    No signs of abscess formation, most likely localized cellulitis probably due to insect bite with reactive lymph nodes of anterior area of of right ear.  Patient was prescribed Bactrim DS twice daily for 1 week  Advised to drink plenty of fluids  Follow-up with primary care provider 1 week recommended  Return to ED if symptoms worsen or any concerns      I have reviewed the nursing  notes.    I have reviewed the findings, diagnosis, plan and need for follow up with the patient.      New Prescriptions    SULFAMETHOXAZOLE-TRIMETHOPRIM (BACTRIM DS) 800-160 MG TABLET    Take 1 tablet by mouth 2 times daily for 7 days       Final diagnoses:   Cellulitis of face       8/29/2020   [facility]     Hema Myers MD  08/29/20 0893

## 2020-08-29 NOTE — ED NOTES
Face to face report given with opportunity to observe patient.    Report given to Izzy Humphrey RN   8/29/2020  6:55 AM

## 2020-08-31 ENCOUNTER — OFFICE VISIT (OUTPATIENT)
Dept: FAMILY MEDICINE | Facility: OTHER | Age: 45
End: 2020-08-31
Attending: FAMILY MEDICINE
Payer: COMMERCIAL

## 2020-08-31 ENCOUNTER — MYC MEDICAL ADVICE (OUTPATIENT)
Dept: FAMILY MEDICINE | Facility: OTHER | Age: 45
End: 2020-08-31

## 2020-08-31 VITALS
HEART RATE: 75 BPM | DIASTOLIC BLOOD PRESSURE: 64 MMHG | RESPIRATION RATE: 20 BRPM | OXYGEN SATURATION: 99 % | TEMPERATURE: 97.4 F | WEIGHT: 148 LBS | BODY MASS INDEX: 24.63 KG/M2 | SYSTOLIC BLOOD PRESSURE: 110 MMHG

## 2020-08-31 DIAGNOSIS — N95.1 PERI-MENOPAUSE: ICD-10-CM

## 2020-08-31 DIAGNOSIS — B02.9 HERPES ZOSTER WITHOUT COMPLICATION: Primary | ICD-10-CM

## 2020-08-31 PROCEDURE — 99214 OFFICE O/P EST MOD 30 MIN: CPT | Performed by: FAMILY MEDICINE

## 2020-08-31 RX ORDER — VALACYCLOVIR HYDROCHLORIDE 1 G/1
1000 TABLET, FILM COATED ORAL 3 TIMES DAILY
Qty: 21 TABLET | Refills: 0 | Status: SHIPPED | OUTPATIENT
Start: 2020-08-31 | End: 2020-10-08

## 2020-08-31 RX ORDER — GABAPENTIN 100 MG/1
100 CAPSULE ORAL 3 TIMES DAILY
Qty: 40 CAPSULE | Refills: 1 | Status: SHIPPED | OUTPATIENT
Start: 2020-08-31 | End: 2020-10-08

## 2020-08-31 RX ORDER — MULTIVITAMIN WITH MINERALS
3 TABLET ORAL 2 TIMES DAILY
Qty: 180 TABLET | Refills: 3 | Status: SHIPPED | OUTPATIENT
Start: 2020-08-31 | End: 2020-09-22

## 2020-08-31 ASSESSMENT — PAIN SCALES - GENERAL: PAINLEVEL: EXTREME PAIN (8)

## 2020-08-31 NOTE — PROGRESS NOTES
Subjective     Deanne Yee is a 45 year old female who presents to clinic today for the following health issues:    HPI       ED/UC Followup:    Facility:  [facility] [place]  Date of visit: 8-29-20  Reason for visit: bug bite, cellulitis of face   Current Status: swelling around eye, jaw, pain from top of head down the right side of face. States it is starting to mess with sinuses as well today.        Concern - divina-menopause and mood swings  Onset: 2 weeks  Description: very irritable, segundo, fatigue  Intensity: moderate, severe  Progression of Symptoms:  worsening  Accompanying Signs & Symptoms: longer cycle last month  Previous history of similar problem: no  Precipitating factors:        Worsened by: age  Alleviating factors:        Improved by: n/a  Therapies tried and outcome:  none     Review of Systems   Constitutional, HEENT, cardiovascular, pulmonary, gi and gu systems are negative, except as otherwise noted.      Objective    /64 (BP Location: Right arm, Patient Position: Chair, Cuff Size: Adult Regular)   Pulse 75   Temp 97.4  F (36.3  C) (Tympanic)   Resp 20   Wt 67.1 kg (148 lb)   LMP 08/19/2020   SpO2 99%   BMI 24.63 kg/m    Body mass index is 24.63 kg/m .  Physical Exam   GENERAL: healthy, alert and no distress  EYES: Eyes grossly normal to inspection, PERRL and conjunctivae and sclerae normal  HENT: ear canals and TM's normal, nose and mouth without ulcers or lesions  NECK: no adenopathy, no asymmetry, masses, or scars and thyroid normal to palpation  RESP: lungs clear to auscultation - no rales, rhonchi or wheezes  CV: regular rate and rhythm, normal S1 S2, no S3 or S4, no murmur, click or rub, no peripheral edema and peripheral pulses strong  MS: no gross musculoskeletal defects noted, no edema  SKIN: vesicle - face  PSYCH: mentation appears normal, affect normal/bright    No results found for any visits on 08/31/20.        Assessment & Plan     Herpes zoster without complication  Will  treat due to proximity to eye and on face  Follow-up if not improving  Get shingles vaccine in 2 mos  - valACYclovir (VALTREX) 1000 mg tablet; Take 1 tablet (1,000 mg) by mouth 3 times daily for 7 days  - gabapentin (NEURONTIN) 100 MG capsule; Take 1 capsule (100 mg) by mouth 3 times daily X 3 days then increase to 200mg TID x 3 days then increase to 300mg TID    Payal-menopause  Follow-up for hormone testing prn  Or selective serotonin reuptake inhibitor post peak for mood changes  - Multiple Vitamins-Minerals (OPTIVITE P.M.T.) TABS; Take 3 tablets by mouth 2 times daily     Patient was agreeable to this plan and had no further questions.  Patient Instructions   Patient Education     Shingles  Shingles is a viral infection caused by the same virus that causes chicken pox. Anyone who has had chicken pox may get shingles later in life. The virus stays in the body, but remains asleep (dormant). Shingles often occurs in older persons or persons with lowered immunity. But it can affect anyone at any age.  Shingles starts as a tingling patch of skin on one side of the body. Small, painful blisters may then appear. The rash rarely spreads to other parts of the body.  Exposure to shingles can't cause shingles. However, it can cause chicken pox in anyone who has not had chicken pox or has not been vaccinated. The contagious period ends when all blisters have crusted over, generally 1 to 2 weeks after the illness starts.  After the blisters heal, the affected skin may be sensitive or painful for weeks or months, gradually resolving over time. But, sometimes this can last longer and be permanent (called postherpetic neuralgia.)  Shingles vaccines are available. Vaccination can help prevent shingles or make it less painful. It is generally recommended for adults older than 50, even if you've had singles in the past. Talk with your healthcare provider about when to get vaccinated and which vaccine is best for you.  Home  care    Medicines may be prescribed to help relieve pain. Take these medicines as directed. Ask your healthcare provider or pharmacist before using over-the-counter medicines for helping treat pain and itching.    In certain cases, antiviral medicines may be prescribed to reduce pain, shorten the illness, and prevent neuralgia. Take these medicines as directed.    Compresses made from a solution of cool water mixed with cornstarch or baking soda may help relieve pain and itching.     Gently wash skin daily with soap and water to help prevent infection. Be certain to rinse off all of the soap, which can be irritating.    Trim fingernails and try not to scratch. Scratching the sores may leave scars.    Stay home from work or school until all blisters have formed a crust and you are no longer contagious.  Follow-up care  Follow up with your healthcare provider, or as directed.  When to seek medical advice    Fever of 100.4 F (38 C) or higher, or as directed by your healthcare provider    Affected skin is on the face or neck and any of the following occur:  ? Headache  ? Eye pain  ? Changes in vision  ? Sores near the eye  ? Weakness of facial muscles    Blisters occurring on new areas of the body    Pain, redness, or swelling of a joint    Signs of skin infection: colored drainage from the sores, warmth, increasing redness, fever, or increasing pain  Date Last Reviewed: 4/1/2018 2000-2019 [employer]. [address] [place], [place] [address]. All rights reserved. This information is not intended as a substitute for professional medical care. Always follow your healthcare professional's instructions.               No follow-ups on file.    Astrid Romero MD  [facility]

## 2020-08-31 NOTE — PATIENT INSTRUCTIONS
Patient Education     Shingles  Shingles is a viral infection caused by the same virus that causes chicken pox. Anyone who has had chicken pox may get shingles later in life. The virus stays in the body, but remains asleep (dormant). Shingles often occurs in older persons or persons with lowered immunity. But it can affect anyone at any age.  Shingles starts as a tingling patch of skin on one side of the body. Small, painful blisters may then appear. The rash rarely spreads to other parts of the body.  Exposure to shingles can't cause shingles. However, it can cause chicken pox in anyone who has not had chicken pox or has not been vaccinated. The contagious period ends when all blisters have crusted over, generally 1 to 2 weeks after the illness starts.  After the blisters heal, the affected skin may be sensitive or painful for weeks or months, gradually resolving over time. But, sometimes this can last longer and be permanent (called postherpetic neuralgia.)  Shingles vaccines are available. Vaccination can help prevent shingles or make it less painful. It is generally recommended for adults older than 50, even if you've had singles in the past. Talk with your healthcare provider about when to get vaccinated and which vaccine is best for you.  Home care    Medicines may be prescribed to help relieve pain. Take these medicines as directed. Ask your healthcare provider or pharmacist before using over-the-counter medicines for helping treat pain and itching.    In certain cases, antiviral medicines may be prescribed to reduce pain, shorten the illness, and prevent neuralgia. Take these medicines as directed.    Compresses made from a solution of cool water mixed with cornstarch or baking soda may help relieve pain and itching.     Gently wash skin daily with soap and water to help prevent infection. Be certain to rinse off all of the soap, which can be irritating.    Trim fingernails and try not to scratch. Scratching  the sores may leave scars.    Stay home from work or school until all blisters have formed a crust and you are no longer contagious.  Follow-up care  Follow up with your healthcare provider, or as directed.  When to seek medical advice    Fever of 100.4 F (38 C) or higher, or as directed by your healthcare provider    Affected skin is on the face or neck and any of the following occur:  ? Headache  ? Eye pain  ? Changes in vision  ? Sores near the eye  ? Weakness of facial muscles    Blisters occurring on new areas of the body    Pain, redness, or swelling of a joint    Signs of skin infection: colored drainage from the sores, warmth, increasing redness, fever, or increasing pain  Date Last Reviewed: 4/1/2018 2000-2019 [employer]. [address] [place] [address], [place] [address]. All rights reserved. This information is not intended as a substitute for professional medical care. Always follow your healthcare professional's instructions.

## 2020-08-31 NOTE — NURSING NOTE
"Chief Complaint   Patient presents with     [facility] care f/u       Initial /64 (BP Location: Right arm, Patient Position: Chair, Cuff Size: Adult Regular)   Pulse 75   Temp 97.4  F (36.3  C) (Tympanic)   Resp 20   Wt 67.1 kg (148 lb)   LMP 08/19/2020   SpO2 99%   BMI 24.63 kg/m   Estimated body mass index is 24.63 kg/m  as calculated from the following:    Height as of 8/29/20: 1.651 m (5' 5\").    Weight as of this encounter: 67.1 kg (148 lb).  Medication Reconciliation: complete  Ilsa Hassan LPN    "

## 2020-09-22 ENCOUNTER — MYC MEDICAL ADVICE (OUTPATIENT)
Dept: FAMILY MEDICINE | Facility: OTHER | Age: 45
End: 2020-09-22

## 2020-09-22 DIAGNOSIS — N95.1 PERI-MENOPAUSE: ICD-10-CM

## 2020-09-22 RX ORDER — MULTIVITAMIN WITH MINERALS
3 TABLET ORAL 2 TIMES DAILY
Qty: 270 TABLET | Refills: 3 | Status: SHIPPED | OUTPATIENT
Start: 2020-09-22 | End: 2020-09-28

## 2020-09-28 ENCOUNTER — TELEPHONE (OUTPATIENT)
Dept: FAMILY MEDICINE | Facility: OTHER | Age: 45
End: 2020-09-28

## 2020-09-28 DIAGNOSIS — R53.83 FATIGUE, UNSPECIFIED TYPE: ICD-10-CM

## 2020-09-28 DIAGNOSIS — R61 NIGHT SWEATS: Primary | ICD-10-CM

## 2020-09-28 DIAGNOSIS — N95.1 PERI-MENOPAUSE: ICD-10-CM

## 2020-09-28 DIAGNOSIS — F43.21 ADJUSTMENT DISORDER WITH DEPRESSED MOOD: ICD-10-CM

## 2020-09-28 RX ORDER — MULTIVITAMIN WITH MINERALS
1 TABLET ORAL 2 TIMES DAILY
Qty: 180 TABLET | Refills: 3 | Status: SHIPPED | OUTPATIENT
Start: 2020-09-28 | End: 2021-11-22

## 2020-09-28 RX ORDER — SERTRALINE HYDROCHLORIDE 100 MG/1
TABLET, FILM COATED ORAL
Qty: 180 TABLET | Refills: 3 | Status: SHIPPED | OUTPATIENT
Start: 2020-09-28 | End: 2021-09-27

## 2020-09-28 NOTE — TELEPHONE ENCOUNTER
Called pt, states that she has not been able to get medication from local pharmacy so has not started taking it yet. Pt was prescribed this medication for divina-menopause, irritability, hormone fluctuations per pt. Originally written by Dr Romero. Please advise. Thank you!

## 2020-09-28 NOTE — TELEPHONE ENCOUNTER
Clarify with pt she is taking that much and if so reasoning for it?? I believe DR DIAS order it. Pt is coming back to me as PCP.

## 2020-09-28 NOTE — TELEPHONE ENCOUNTER
Received a fax from [facility] regarding optivite. Script is written for 3 tabs twice daily. This exceeds usual recommended dosing and pharmacy needs clarification before filling. Please advise. Thank you!

## 2020-09-28 NOTE — TELEPHONE ENCOUNTER
Oksana--I called pt - will change to 1 tab BID - please call pharmacist and see if ok.    Obdulia - please move up her Px appt for the anytime from  10/7 on  And cancel her  current PX date of 10/27        Pt is c/o lots of fatique/night sweats/HA/irritability - discussed this with her. Labs placed already and will move her Px up . 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 Will see anytime after 10/6.    Pt to increase zoloft to 2 tabs and will go with smaller amt of vitamin given by DR DIAS. Will clarify reasoning of high dose vitamin with DR DIAS.

## 2020-09-29 ENCOUNTER — OFFICE VISIT (OUTPATIENT)
Dept: FAMILY MEDICINE | Facility: OTHER | Age: 45
End: 2020-09-29
Attending: FAMILY MEDICINE
Payer: COMMERCIAL

## 2020-09-29 DIAGNOSIS — Z20.822 EXPOSURE TO COVID-19 VIRUS: Primary | ICD-10-CM

## 2020-09-29 PROCEDURE — U0003 INFECTIOUS AGENT DETECTION BY NUCLEIC ACID (DNA OR RNA); SEVERE ACUTE RESPIRATORY SYNDROME CORONAVIRUS 2 (SARS-COV-2) (CORONAVIRUS DISEASE [COVID-19]), AMPLIFIED PROBE TECHNIQUE, MAKING USE OF HIGH THROUGHPUT TECHNOLOGIES AS DESCRIBED BY CMS-2020-01-R: HCPCS | Performed by: FAMILY MEDICINE

## 2020-10-01 LAB
SARS-COV-2 RNA SPEC QL NAA+PROBE: NOT DETECTED
SPECIMEN SOURCE: NORMAL

## 2020-10-05 NOTE — PROGRESS NOTES
e3  Folate  SUBJECTIVE:   CC: Deanne Yee is an 45 year old woman who presents for preventive health visit.     {  Today's PHQ-2 Score:   PHQ-2 ( 1999 [employer]) 5/30/2017 5/8/2017   Q1: Little interest or pleasure in doing things 0 1   Q2: Feeling down, depressed or hopeless 0 1   PHQ-2 Score 0 2         Social History     Tobacco Use     Smoking status: Never Smoker     Smokeless tobacco: Never Used   Substance Use Topics     Alcohol use: Yes     Alcohol/week: 0.0 standard drinks     Comment: 1 beer daily                        Reviewed orders with patient.  Reviewed health maintenance and updated orders accordingly - Yes  Lab work is in process    Mammogram Screening: Patient over age 50, mutual decision to screen reflected in health maintenance.    Pertinent mammograms are reviewed under the imaging tab.  History of abnormal Pap smear: NO - age 30-65 PAP every 5 years with negative HPV co-testing recommended  PAP / HPV 10/21/2015 1/29/2014   PAP NIL NIL     Reviewed and updated as needed this visit by clinical staff                  Has noticed lots of changes and we discussed this in last visit --- has list of symptoms   HA daily - low grade most of day-- frontal - anytime of day can wake up with HA  Tingling and Achiness in both hands and sometimes /rare in both feet-- worse in arms and hands ---- seems more prominent at work-- sits on computer for hrs  Trouble concentrating and focusing - home and work both-- for several months-- may started with some irregular menses-- has some suffering with work produced  Mood changes-- more irritable /angry/ snapping /down some -- no reason to be--- no increase stressors/ less stress  Increase dry eyes  Night sweats - damp and sweaty - damp and not soaked- several years after last pregnancy   Lump over left neck -- small pea size - mild tender       No rash   No joint pain other than hands and wrist some but tight at times in feet  Menses back to be regular   Lots of  "sinus sx--- pressure /congestion/pain rare--h/o allergies   No visual issues  Some mild recent vaginal dryness   [employer] - analisa after 2 pm   No issue with sleep      Just bumped up zoloft - no side effects or improvement yet      Reviewed and updated as needed this visit by Provider                Past Medical History:   Diagnosis Date     Cervical dysplasia 01/12/2012    Last pap 8/2/17 Normal in [place]. No HPV done.     MATHEW (generalized anxiety disorder)     started zoloft while pregnant 2016     GERD (gastroesophageal reflux disease)     while pregnant     Hx of migraine headaches 01/12/2012     Hypothyroidism      Preeclampsia     post partum     Reactive airway disease 01/12/2012     S/P LEEP (loop electrosurgical excision procedure) 01/12/2012     Sleep Pattern Disturbance 01/25/2013      Past Surgical History:   Procedure Laterality Date     DENTAL SURGERY      wisdom teeth removal     Loop electrosurgical excision procedure  2006       ROS:  CONSTITUTIONAL: NEGATIVE for fever, chills, change in weight  INTEGUMENTARU/SKIN: NEGATIVE for worrisome rashes, moles or lesions  EYES: NEGATIVE for vision changes or irritation  ENT: NEGATIVE for ear, mouth and throat problems  RESP: NEGATIVE for significant cough or SOB  BREAST: NEGATIVE for masses, tenderness or discharge  CV: NEGATIVE for chest pain, palpitations or peripheral edema  GI: NEGATIVE for nausea, abdominal pain, heartburn, or change in bowel habits  : NEGATIVE for unusual urinary or vaginal symptoms. Periods are regular.  MUSCULOSKELETAL: NEGATIVE for significant arthralgias or myalgia  NEURO: NEGATIVE for weakness, dizziness or paresthesias  PSYCHIATRIC: NEGATIVE for changes in mood or affect  SEE ABOVE   OBJECTIVE:   /72   Pulse 70   Temp 98.6  F (37  C) (Tympanic)   Ht 1.651 m (5' 5\")   Wt 65.3 kg (144 lb)   SpO2 99%   BMI 23.96 kg/m    EXAM:  GENERAL: healthy, alert and no distress  EYES: Eyes grossly normal to inspection, PERRL " and conjunctivae and sclerae normal  HENT: ear canals and TM's normal, nose and mouth without ulcers or lesions  NECK: no adenopathy, no asymmetry, masses, or scars and thyroid normal to palpation  RESP: lungs clear to auscultation - no rales, rhonchi or wheezes  BREAST: normal without masses, tenderness or nipple discharge and no palpable axillary masses or adenopathy  CV: regular rate and rhythm, normal S1 S2, no S3 or S4, no murmur, click or rub, no peripheral edema and peripheral pulses strong  ABDOMEN: soft, nontender, no hepatosplenomegaly, no masses and bowel sounds normal   (female): normal female external genitalia, normal urethral meatus, vaginal mucosa pink, moist, well rugated, and normal cervix/adnexa/uterus without masses or discharge  MS: no gross musculoskeletal defects noted, no edema  SKIN: no suspicious lesions or rashes  NEURO: Normal strength and tone, mentation intact and speech normal  PSYCH: mentation appears normal, affect normal/bright  LYMPH: no cervical, supraclavicular, axillary, or inguinal adenopathy-- small mobile pea size LN on anterior left cervical chain     Results for orders placed or performed in visit on 10/08/20   XR Chest 2 Views     Status: None    Narrative    PROCEDURE:  XR CHEST 2 VW    HISTORY: Night sweats; Fatigue, unspecified type, .    COMPARISON:  4/9/2017    FINDINGS:  The cardiomediastinal contours are normal.  The trachea is midline.  No focal consolidation, effusion or pneumothorax.    No suspicious osseous lesion or subdiaphragmatic free air.      Impression    IMPRESSION:      No acute cardiopulmonary process.  Stable chest.    CHANTAL NUNEZ MD   Erythrocyte sedimentation rate auto     Status: None   Result Value Ref Range    Sed Rate 14 0 - 20 mm/h   CRP inflammation     Status: Abnormal   Result Value Ref Range    CRP Inflammation 9.6 (H) 0.0 - 8.0 mg/L   TSH     Status: None   Result Value Ref Range    TSH 1.91 0.40 - 4.00 mU/L   Comprehensive  metabolic panel     Status: None   Result Value Ref Range    Sodium 137 133 - 144 mmol/L    Potassium 3.8 3.4 - 5.3 mmol/L    Chloride 104 94 - 109 mmol/L    Carbon Dioxide 29 20 - 32 mmol/L    Anion Gap 4 3 - 14 mmol/L    Glucose 92 70 - 99 mg/dL    Urea Nitrogen 11 7 - 30 mg/dL    Creatinine 0.76 0.52 - 1.04 mg/dL    GFR Estimate >90 >60 mL/min/[1.73_m2]    GFR Estimate If Black >90 >60 mL/min/[1.73_m2]    Calcium 8.9 8.5 - 10.1 mg/dL    Bilirubin Total 0.4 0.2 - 1.3 mg/dL    Albumin 4.3 3.4 - 5.0 g/dL    Protein Total 8.6 6.8 - 8.8 g/dL    Alkaline Phosphatase 67 40 - 150 U/L    ALT 20 0 - 50 U/L    AST 20 0 - 45 U/L   CBC with platelets differential     Status: None   Result Value Ref Range    WBC 5.3 4.0 - 11.0 10e9/L    RBC Count 4.62 3.8 - 5.2 10e12/L    Hemoglobin 14.0 11.7 - 15.7 g/dL    Hematocrit 40.8 35.0 - 47.0 %    MCV 88 78 - 100 fl    MCH 30.3 26.5 - 33.0 pg    MCHC 34.3 31.5 - 36.5 g/dL    RDW 12.0 10.0 - 15.0 %    Platelet Count 270 150 - 450 10e9/L    Diff Method Automated Method     % Neutrophils 61.0 %    % Lymphocytes 25.7 %    % Monocytes 11.0 %    % Eosinophils 1.3 %    % Basophils 0.8 %    % Immature Granulocytes 0.2 %    Nucleated RBCs 0 0 /100    Absolute Neutrophil 3.2 1.6 - 8.3 10e9/L    Absolute Lymphocytes 1.4 0.8 - 5.3 10e9/L    Absolute Monocytes 0.6 0.0 - 1.3 10e9/L    Absolute Eosinophils 0.1 0.0 - 0.7 10e9/L    Absolute Basophils 0.0 0.0 - 0.2 10e9/L    Abs Immature Granulocytes 0.0 0 - 0.4 10e9/L    Absolute Nucleated RBC 0.0          ASSESSMENT/PLAN:   1. Routine general medical examination at a health care facility  Overall healthy. Mammogram pending   - INFLUENZA VACCINE IM > 6 MONTHS VALENT IIV4 [05725]    2. Night sweats  Labs and work up pending. Most likely part of perimenopause. Discussed. Will call with labs. Doubt meds and doubt any significant pathology but small work up done   - Folate  - OFFICE/OUTPT VISIT,NITHIN THOMPSON III    3. Payal-menopause  As above. Labs pending  "discussed   - Folate  - OFFICE/OUTPT VISIT,EST,LEVL III    4. Other specified hypothyroidism  TSH ok   - OFFICE/OUTPT VISIT,EST,LEVL III    5. Adjustment disorder with depressed mood  Long discussion-- I think this is playing a role -- between Covid/ overall world events/  with young kids --- this is giving lot of her somatic sx and change in mood etc.  Discussed - needs to find balance and Deanne time . We have increased zoloft and will f/u in 4-5 weeks.   - OFFICE/OUTPT VISIT,EST,LEVL III    6. Dysplasia of cervix  In remote past. PAP done     7. Migraine with aura and without status migrainosus, not intractable  Stable most days but has some sinus and what sounds to be tension HA   - OFFICE/OUTPT VISIT,EST,LEVL III    8. Encounter for Papanicolaou smear for cervical cancer screening  Pap done   - A pap thin layer screen with  HPV - recommended age 30 - 65 years (select HPV order below)  - HPV High Risk Types DNA Cervical    9. Need for prophylactic vaccination and inoculation against influenza  Shot given   - Vaccine Administration, Initial [57108]    10. Paresthesia  Positional and tension related. Labs pending . Negative Tinel and not classic for CTS. Will watch/ decrease tension and positional changes at work.   - Folate  - OFFICE/OUTPT VISIT,EST,LEVL III    Patient has been advised of split billing requirements and indicates understanding: No  COUNSELING:   Reviewed preventive health counseling, as reflected in patient instructions       Regular exercise       Healthy diet/nutrition    Estimated body mass index is 24.63 kg/m  as calculated from the following:    Height as of 8/29/20: 1.651 m (5' 5\").    Weight as of 8/31/20: 67.1 kg (148 lb).        She reports that she has never smoked. She has never used smokeless tobacco.      Counseling Resources:  ATP IV Guidelines  Pooled Cohorts Equation Calculator  Breast Cancer Risk Calculator  BRCA-Related Cancer Risk Assessment: FHS-7 Tool  FRAX Risk " Assessment  ICSI Preventive Guidelines  Dietary Guidelines for Americans, 2010  USDA's MyPlate  ASA Prophylaxis  Lung CA Screening    Arnav Lawrence MD  [facility] [place]  Answers for HPI/ROS submitted by the patient on 10/7/2020   Annual Exam:  Frequency of exercise:: 4-5 days/week  Getting at least 3 servings of Calcium per day:: Yes  Diet:: Regular (no restrictions)  Taking medications regularly:: Yes  Medication side effects:: Not applicable  Bi-annual eye exam:: Yes  Dental care twice a year:: Yes  Sleep apnea or symptoms of sleep apnea:: Daytime drowsiness  Additional concerns today:: Yes  Duration of exercise:: 30-45 minutes  If you checked off any problems, how difficult have these problems made it for you to do your work, take care of things at home, or get along with other people?: Somewhat difficult  PHQ9 TOTAL SCORE: 7

## 2020-10-05 NOTE — PATIENT INSTRUCTIONS
Preventive Health Recommendations  Female Ages 40 to 49    Yearly exam:     See your health care provider every year in order to  1. Review health changes.   2. Discuss preventive care.    3. Review your medicines if your doctor prescribed any.      Get a Pap test every three years (unless you have an abnormal result and your provider advises testing more often).      If you get Pap tests with HPV test, you only need to test every 5 years, unless you have an abnormal result. You do not need a Pap test if your uterus was removed (hysterectomy) and you have not had cancer.      You should be tested each year for STDs (sexually transmitted diseases), if you're at risk.     Ask your doctor if you should have a mammogram.      Have a colonoscopy (test for colon cancer) if someone in your family has had colon cancer or polyps before age 50.       Have a cholesterol test every 5 years.       Have a diabetes test (fasting glucose) after age 45. If you are at risk for diabetes, you should have this test every 3 years.    Shots: Get a flu shot each year. Get a tetanus shot every 10 years.     Nutrition:     Eat at least 5 servings of fruits and vegetables each day.    Eat whole-grain bread, whole-wheat pasta and brown rice instead of white grains and rice.    Get adequate Calcium and Vitamin D.      Lifestyle    Exercise at least 150 minutes a week (an average of 30 minutes a day, 5 days a week). This will help you control your weight and prevent disease.    Limit alcohol to one drink per day.    No smoking.     Wear sunscreen to prevent skin cancer.    See your dentist every six months for an exam and cleaning.    Results for orders placed or performed in visit on 10/08/20   XR Chest 2 Views     Status: None    Narrative    PROCEDURE:  XR CHEST 2 VW    HISTORY: Night sweats; Fatigue, unspecified type, .    COMPARISON:  4/9/2017    FINDINGS:  The cardiomediastinal contours are normal.  The trachea is midline.  No focal  consolidation, effusion or pneumothorax.    No suspicious osseous lesion or subdiaphragmatic free air.      Impression    IMPRESSION:      No acute cardiopulmonary process.  Stable chest.    CHANTAL NUNEZ MD   Erythrocyte sedimentation rate auto     Status: None   Result Value Ref Range    Sed Rate 14 0 - 20 mm/h   CRP inflammation     Status: Abnormal   Result Value Ref Range    CRP Inflammation 9.6 (H) 0.0 - 8.0 mg/L   TSH     Status: None   Result Value Ref Range    TSH 1.91 0.40 - 4.00 mU/L   Comprehensive metabolic panel     Status: None   Result Value Ref Range    Sodium 137 133 - 144 mmol/L    Potassium 3.8 3.4 - 5.3 mmol/L    Chloride 104 94 - 109 mmol/L    Carbon Dioxide 29 20 - 32 mmol/L    Anion Gap 4 3 - 14 mmol/L    Glucose 92 70 - 99 mg/dL    Urea Nitrogen 11 7 - 30 mg/dL    Creatinine 0.76 0.52 - 1.04 mg/dL    GFR Estimate >90 >60 mL/min/[1.73_m2]    GFR Estimate If Black >90 >60 mL/min/[1.73_m2]    Calcium 8.9 8.5 - 10.1 mg/dL    Bilirubin Total 0.4 0.2 - 1.3 mg/dL    Albumin 4.3 3.4 - 5.0 g/dL    Protein Total 8.6 6.8 - 8.8 g/dL    Alkaline Phosphatase 67 40 - 150 U/L    ALT 20 0 - 50 U/L    AST 20 0 - 45 U/L   CBC with platelets differential     Status: None   Result Value Ref Range    WBC 5.3 4.0 - 11.0 10e9/L    RBC Count 4.62 3.8 - 5.2 10e12/L    Hemoglobin 14.0 11.7 - 15.7 g/dL    Hematocrit 40.8 35.0 - 47.0 %    MCV 88 78 - 100 fl    MCH 30.3 26.5 - 33.0 pg    MCHC 34.3 31.5 - 36.5 g/dL    RDW 12.0 10.0 - 15.0 %    Platelet Count 270 150 - 450 10e9/L    Diff Method Automated Method     % Neutrophils 61.0 %    % Lymphocytes 25.7 %    % Monocytes 11.0 %    % Eosinophils 1.3 %    % Basophils 0.8 %    % Immature Granulocytes 0.2 %    Nucleated RBCs 0 0 /100    Absolute Neutrophil 3.2 1.6 - 8.3 10e9/L    Absolute Lymphocytes 1.4 0.8 - 5.3 10e9/L    Absolute Monocytes 0.6 0.0 - 1.3 10e9/L    Absolute Eosinophils 0.1 0.0 - 0.7 10e9/L    Absolute Basophils 0.0 0.0 - 0.2 10e9/L    Abs Immature  Granulocytes 0.0 0 - 0.4 10e9/L    Absolute Nucleated RBC 0.0

## 2020-10-07 ASSESSMENT — PATIENT HEALTH QUESTIONNAIRE - PHQ9
SUM OF ALL RESPONSES TO PHQ QUESTIONS 1-9: 7
10. IF YOU CHECKED OFF ANY PROBLEMS, HOW DIFFICULT HAVE THESE PROBLEMS MADE IT FOR YOU TO DO YOUR WORK, TAKE CARE OF THINGS AT HOME, OR GET ALONG WITH OTHER PEOPLE: SOMEWHAT DIFFICULT
SUM OF ALL RESPONSES TO PHQ QUESTIONS 1-9: 7

## 2020-10-08 ENCOUNTER — OFFICE VISIT (OUTPATIENT)
Dept: FAMILY MEDICINE | Facility: OTHER | Age: 45
End: 2020-10-08
Attending: FAMILY MEDICINE
Payer: COMMERCIAL

## 2020-10-08 ENCOUNTER — ANCILLARY PROCEDURE (OUTPATIENT)
Dept: GENERAL RADIOLOGY | Facility: OTHER | Age: 45
End: 2020-10-08
Attending: FAMILY MEDICINE
Payer: COMMERCIAL

## 2020-10-08 VITALS
WEIGHT: 144 LBS | HEART RATE: 70 BPM | HEIGHT: 65 IN | OXYGEN SATURATION: 99 % | SYSTOLIC BLOOD PRESSURE: 112 MMHG | DIASTOLIC BLOOD PRESSURE: 72 MMHG | TEMPERATURE: 98.6 F | BODY MASS INDEX: 23.99 KG/M2

## 2020-10-08 DIAGNOSIS — G43.109 MIGRAINE WITH AURA AND WITHOUT STATUS MIGRAINOSUS, NOT INTRACTABLE: ICD-10-CM

## 2020-10-08 DIAGNOSIS — R20.2 PARESTHESIA: ICD-10-CM

## 2020-10-08 DIAGNOSIS — Z00.00 ROUTINE GENERAL MEDICAL EXAMINATION AT A HEALTH CARE FACILITY: Primary | ICD-10-CM

## 2020-10-08 DIAGNOSIS — Z23 NEED FOR PROPHYLACTIC VACCINATION AND INOCULATION AGAINST INFLUENZA: ICD-10-CM

## 2020-10-08 DIAGNOSIS — N95.1 PERI-MENOPAUSE: ICD-10-CM

## 2020-10-08 DIAGNOSIS — R61 NIGHT SWEATS: ICD-10-CM

## 2020-10-08 DIAGNOSIS — Z12.4 ENCOUNTER FOR PAPANICOLAOU SMEAR FOR CERVICAL CANCER SCREENING: ICD-10-CM

## 2020-10-08 DIAGNOSIS — N87.9 DYSPLASIA OF CERVIX: ICD-10-CM

## 2020-10-08 DIAGNOSIS — F43.21 ADJUSTMENT DISORDER WITH DEPRESSED MOOD: ICD-10-CM

## 2020-10-08 DIAGNOSIS — E03.8 OTHER SPECIFIED HYPOTHYROIDISM: ICD-10-CM

## 2020-10-08 DIAGNOSIS — R53.83 FATIGUE, UNSPECIFIED TYPE: ICD-10-CM

## 2020-10-08 LAB
ALBUMIN SERPL-MCNC: 4.3 G/DL (ref 3.4–5)
ALP SERPL-CCNC: 67 U/L (ref 40–150)
ALT SERPL W P-5'-P-CCNC: 20 U/L (ref 0–50)
ANION GAP SERPL CALCULATED.3IONS-SCNC: 4 MMOL/L (ref 3–14)
AST SERPL W P-5'-P-CCNC: 20 U/L (ref 0–45)
BASOPHILS # BLD AUTO: 0 10E9/L (ref 0–0.2)
BASOPHILS NFR BLD AUTO: 0.8 %
BILIRUB SERPL-MCNC: 0.4 MG/DL (ref 0.2–1.3)
BUN SERPL-MCNC: 11 MG/DL (ref 7–30)
CALCIUM SERPL-MCNC: 8.9 MG/DL (ref 8.5–10.1)
CHLORIDE SERPL-SCNC: 104 MMOL/L (ref 94–109)
CO2 SERPL-SCNC: 29 MMOL/L (ref 20–32)
CREAT SERPL-MCNC: 0.76 MG/DL (ref 0.52–1.04)
CRP SERPL-MCNC: 9.6 MG/L (ref 0–8)
DIFFERENTIAL METHOD BLD: NORMAL
EOSINOPHIL # BLD AUTO: 0.1 10E9/L (ref 0–0.7)
EOSINOPHIL NFR BLD AUTO: 1.3 %
ERYTHROCYTE [DISTWIDTH] IN BLOOD BY AUTOMATED COUNT: 12 % (ref 10–15)
ERYTHROCYTE [SEDIMENTATION RATE] IN BLOOD BY WESTERGREN METHOD: 14 MM/H (ref 0–20)
GFR SERPL CREATININE-BSD FRML MDRD: >90 ML/MIN/{1.73_M2}
GLUCOSE SERPL-MCNC: 92 MG/DL (ref 70–99)
HCT VFR BLD AUTO: 40.8 % (ref 35–47)
HGB BLD-MCNC: 14 G/DL (ref 11.7–15.7)
IMM GRANULOCYTES # BLD: 0 10E9/L (ref 0–0.4)
IMM GRANULOCYTES NFR BLD: 0.2 %
LYMPHOCYTES # BLD AUTO: 1.4 10E9/L (ref 0.8–5.3)
LYMPHOCYTES NFR BLD AUTO: 25.7 %
MCH RBC QN AUTO: 30.3 PG (ref 26.5–33)
MCHC RBC AUTO-ENTMCNC: 34.3 G/DL (ref 31.5–36.5)
MCV RBC AUTO: 88 FL (ref 78–100)
MONOCYTES # BLD AUTO: 0.6 10E9/L (ref 0–1.3)
MONOCYTES NFR BLD AUTO: 11 %
NEUTROPHILS # BLD AUTO: 3.2 10E9/L (ref 1.6–8.3)
NEUTROPHILS NFR BLD AUTO: 61 %
NRBC # BLD AUTO: 0 10*3/UL
NRBC BLD AUTO-RTO: 0 /100
PLATELET # BLD AUTO: 270 10E9/L (ref 150–450)
POTASSIUM SERPL-SCNC: 3.8 MMOL/L (ref 3.4–5.3)
PROT SERPL-MCNC: 8.6 G/DL (ref 6.8–8.8)
RBC # BLD AUTO: 4.62 10E12/L (ref 3.8–5.2)
SODIUM SERPL-SCNC: 137 MMOL/L (ref 133–144)
TSH SERPL DL<=0.005 MIU/L-ACNC: 1.91 MU/L (ref 0.4–4)
WBC # BLD AUTO: 5.3 10E9/L (ref 4–11)

## 2020-10-08 PROCEDURE — 86039 ANTINUCLEAR ANTIBODIES (ANA): CPT | Performed by: FAMILY MEDICINE

## 2020-10-08 PROCEDURE — 87389 HIV-1 AG W/HIV-1&-2 AB AG IA: CPT | Performed by: FAMILY MEDICINE

## 2020-10-08 PROCEDURE — 80050 GENERAL HEALTH PANEL: CPT | Performed by: FAMILY MEDICINE

## 2020-10-08 PROCEDURE — 82164 ANGIOTENSIN I ENZYME TEST: CPT | Mod: 90 | Performed by: FAMILY MEDICINE

## 2020-10-08 PROCEDURE — 90686 IIV4 VACC NO PRSV 0.5 ML IM: CPT | Performed by: FAMILY MEDICINE

## 2020-10-08 PROCEDURE — 82306 VITAMIN D 25 HYDROXY: CPT | Performed by: FAMILY MEDICINE

## 2020-10-08 PROCEDURE — 86140 C-REACTIVE PROTEIN: CPT | Performed by: FAMILY MEDICINE

## 2020-10-08 PROCEDURE — 90471 IMMUNIZATION ADMIN: CPT | Performed by: FAMILY MEDICINE

## 2020-10-08 PROCEDURE — 99396 PREV VISIT EST AGE 40-64: CPT | Mod: 25 | Performed by: FAMILY MEDICINE

## 2020-10-08 PROCEDURE — 71046 X-RAY EXAM CHEST 2 VIEWS: CPT | Mod: TC | Performed by: FAMILY MEDICINE

## 2020-10-08 PROCEDURE — 82607 VITAMIN B-12: CPT | Performed by: FAMILY MEDICINE

## 2020-10-08 PROCEDURE — 82746 ASSAY OF FOLIC ACID SERUM: CPT | Performed by: FAMILY MEDICINE

## 2020-10-08 PROCEDURE — 86038 ANTINUCLEAR ANTIBODIES: CPT | Performed by: FAMILY MEDICINE

## 2020-10-08 PROCEDURE — 85652 RBC SED RATE AUTOMATED: CPT | Performed by: FAMILY MEDICINE

## 2020-10-08 PROCEDURE — 88142 CYTOPATH C/V THIN LAYER: CPT | Performed by: FAMILY MEDICINE

## 2020-10-08 PROCEDURE — 36415 COLL VENOUS BLD VENIPUNCTURE: CPT | Performed by: FAMILY MEDICINE

## 2020-10-08 PROCEDURE — 71046 X-RAY EXAM CHEST 2 VIEWS: CPT | Mod: TC

## 2020-10-08 PROCEDURE — 87624 HPV HI-RISK TYP POOLED RSLT: CPT | Performed by: FAMILY MEDICINE

## 2020-10-08 PROCEDURE — 83001 ASSAY OF GONADOTROPIN (FSH): CPT | Performed by: FAMILY MEDICINE

## 2020-10-08 ASSESSMENT — ANXIETY QUESTIONNAIRES
6. BECOMING EASILY ANNOYED OR IRRITABLE: MORE THAN HALF THE DAYS
1. FEELING NERVOUS, ANXIOUS, OR ON EDGE: NOT AT ALL
3. WORRYING TOO MUCH ABOUT DIFFERENT THINGS: NOT AT ALL
GAD7 TOTAL SCORE: 3
7. FEELING AFRAID AS IF SOMETHING AWFUL MIGHT HAPPEN: NOT AT ALL
4. TROUBLE RELAXING: NOT AT ALL
2. NOT BEING ABLE TO STOP OR CONTROL WORRYING: NOT AT ALL
5. BEING SO RESTLESS THAT IT IS HARD TO SIT STILL: SEVERAL DAYS

## 2020-10-08 ASSESSMENT — PATIENT HEALTH QUESTIONNAIRE - PHQ9: SUM OF ALL RESPONSES TO PHQ QUESTIONS 1-9: 7

## 2020-10-08 ASSESSMENT — PAIN SCALES - GENERAL: PAINLEVEL: MILD PAIN (3)

## 2020-10-08 ASSESSMENT — MIFFLIN-ST. JEOR: SCORE: 1304.06

## 2020-10-08 NOTE — NURSING NOTE
"Chief Complaint   Patient presents with     Physical       Initial /72   Pulse 70   Temp 98.6  F (37  C) (Tympanic)   Ht 1.651 m (5' 5\")   Wt 65.3 kg (144 lb)   SpO2 99%   BMI 23.96 kg/m   Estimated body mass index is 23.96 kg/m  as calculated from the following:    Height as of this encounter: 1.651 m (5' 5\").    Weight as of this encounter: 65.3 kg (144 lb).  Medication Reconciliation: complete  Jonna Szymanski LPN  "

## 2020-10-09 LAB
FOLATE SERPL-MCNC: 20.2 NG/ML
FSH SERPL-ACNC: 3.9 IU/L
VIT B12 SERPL-MCNC: 389 PG/ML (ref 193–986)

## 2020-10-09 ASSESSMENT — ANXIETY QUESTIONNAIRES: GAD7 TOTAL SCORE: 3

## 2020-10-12 LAB
DEPRECATED CALCIDIOL+CALCIFEROL SERPL-MC: 37 UG/L (ref 20–75)
HIV 1+2 AB+HIV1 P24 AG SERPL QL IA: NONREACTIVE

## 2020-10-13 LAB
ACE SERPL-CCNC: 26 U/L (ref 9–67)
ANA PAT SER IF-IMP: ABNORMAL
ANA SER QL IF: POSITIVE
ANA TITR SER IF: ABNORMAL {TITER}

## 2020-10-14 DIAGNOSIS — R76.8 ANA POSITIVE: Primary | ICD-10-CM

## 2020-10-14 DIAGNOSIS — R61 NIGHT SWEATS: ICD-10-CM

## 2020-10-14 DIAGNOSIS — R53.83 FATIGUE, UNSPECIFIED TYPE: ICD-10-CM

## 2020-10-14 DIAGNOSIS — R20.2 PARESTHESIA: ICD-10-CM

## 2020-10-14 LAB
COPATH REPORT: NORMAL
PAP: NORMAL

## 2020-10-15 LAB
FINAL DIAGNOSIS: NORMAL
HPV HR 12 DNA CVX QL NAA+PROBE: NEGATIVE
HPV16 DNA SPEC QL NAA+PROBE: NEGATIVE
HPV18 DNA SPEC QL NAA+PROBE: NEGATIVE
SPECIMEN DESCRIPTION: NORMAL
SPECIMEN SOURCE CVX/VAG CYTO: NORMAL

## 2020-10-26 ENCOUNTER — OFFICE VISIT (OUTPATIENT)
Dept: FAMILY MEDICINE | Facility: OTHER | Age: 45
End: 2020-10-26
Attending: FAMILY MEDICINE
Payer: COMMERCIAL

## 2020-10-26 DIAGNOSIS — Z20.822 COVID-19 RULED OUT: Primary | ICD-10-CM

## 2020-10-26 PROCEDURE — U0003 INFECTIOUS AGENT DETECTION BY NUCLEIC ACID (DNA OR RNA); SEVERE ACUTE RESPIRATORY SYNDROME CORONAVIRUS 2 (SARS-COV-2) (CORONAVIRUS DISEASE [COVID-19]), AMPLIFIED PROBE TECHNIQUE, MAKING USE OF HIGH THROUGHPUT TECHNOLOGIES AS DESCRIBED BY CMS-2020-01-R: HCPCS | Performed by: FAMILY MEDICINE

## 2020-10-28 LAB
SARS-COV-2 RNA SPEC QL NAA+PROBE: ABNORMAL
SPECIMEN SOURCE: ABNORMAL

## 2020-11-04 DIAGNOSIS — E03.9 ACQUIRED HYPOTHYROIDISM: ICD-10-CM

## 2020-11-04 RX ORDER — LEVOTHYROXINE SODIUM 50 UG/1
TABLET ORAL
Qty: 90 TABLET | Refills: 3 | Status: SHIPPED | OUTPATIENT
Start: 2020-11-04 | End: 2021-10-20

## 2020-11-04 NOTE — TELEPHONE ENCOUNTER
levothyroxine      Last Written Prescription Date:  08/19/20  Last Fill Quantity: 90,   # refills: 0  Last Office Visit: 10/08/20  Future Office visit:    Next 5 appointments (look out 90 days)    Dec 09, 2020  8:00 AM  (Arrive by 7:45 AM)  Screening Mammogram with [address] ([facility] [place] ) [address] [place] [id number]  [phone]

## 2020-11-05 NOTE — PROGRESS NOTES
"Deanne Yee is a 45 year old female who is being evaluated via a billable telephone visit.      The patient has been notified of following:     \"This telephone visit will be conducted via a call between you and your physician/provider. We have found that certain health care needs can be provided without the need for a physical exam.  This service lets us provide the care you need with a short phone conversation.  If a prescription is necessary we can send it directly to your pharmacy.  If lab work is needed we can place an order for that and you can then stop by our lab to have the test done at a later time.    Telephone visits are billed at different rates depending on your insurance coverage. During this emergency period, for some insurers they may be billed the same as an in-person visit.  Please reach out to your insurance provider with any questions.    If during the course of the call the physician/provider feels a telephone visit is not appropriate, you will not be charged for this service.\"    Patient has given verbal consent for Telephone visit?  Yes    What phone number would you like to be contacted at? [phone]    How would you like to obtain your AVS? [place]    Subjective     Deanne Yee is a 45 year old female who presents via phone visit today for the following health issues:    HPI     Depression Followup    How are you doing with your depression since your last visit? Improved a little bit     Are you having other symptoms that might be associated with depression? No    Have you had a significant life event?  No     Are you feeling anxious or having panic attacks?   No    Do you have any concerns with your use of alcohol or other drugs? No     No side effects    Overall doing better - mood better        Social History     Tobacco Use     Smoking status: Never Smoker     Smokeless tobacco: Never Used   Substance Use Topics     Alcohol use: Yes     Alcohol/week: 0.0 standard drinks "     Comment: 1 beer daily     Drug use: No     PHQ 10/7/2020 10/8/2020 11/17/2020   PHQ-9 Total Score 7 7 6   Q9: Thoughts of better off dead/self-harm past 2 weeks Not at all Not at all Not at all     MATHEW-7 SCORE 10/22/2019 10/8/2020 11/17/2020   Total Score 6 3 3     Last PHQ-9 11/17/2020   1.  Little interest or pleasure in doing things 1   2.  Feeling down, depressed, or hopeless 1   3.  Trouble falling or staying asleep, or sleeping too much 1   4.  Feeling tired or having little energy 0   5.  Poor appetite or overeating 0   6.  Feeling bad about yourself 0   7.  Trouble concentrating 3   8.  Moving slowly or restless 0   Q9: Thoughts of better off dead/self-harm past 2 weeks 0   PHQ-9 Total Score 6   Difficulty at work, home, or with people -     MATHEW-7  11/17/2020   1. Feeling nervous, anxious, or on edge 0   2. Not being able to stop or control worrying 0   3. Worrying too much about different things 0   4. Trouble relaxing 1   5. Being so restless that it is hard to sit still 0   6. Becoming easily annoyed or irritable 2   7. Feeling afraid, as if something awful might happen 0   MATHEW-7 Total Score 3   If you checked any problems, how difficult have they made it for you to do your work, take care of things at home, or get along with other people? -         Suicide Assessment Five-step Evaluation and Treatment (SAFE-T)      Headache  Onset/Duration: months  Description  Location: bilateral in the frontal area   Character: dull pain  Frequency:  daily  Duration:  Most of day  Wake with headaches: YES- sometimes  Able to do daily activities when headache present: YES  Intensity:  mild, moderate  Progression of Symptoms:  same  Accompanying signs and symptoms:  Stiff neck: no  Neck or upper back pain: YES- somedays  Sinus or URI symptoms YES- diagnosed with covid end of October - productive cough (white/yellow)- short of breath upon excertion  Fever: no  Nausea or vomiting: no  Dizziness: YES- a couple times but  short duration  Numbness/tingling: no  Weakness: no  Visual changes: none  History  Head trauma: YES- 2001 fell off a horse and had a concussion, migraines [id number]  Family history of migraines: YES- maternal grandmother  Daily pain medication use: no  Previous tests for headaches: YES- 2004/2005 with migraines in [place]  Neurologist evaluation: YES- neurology in [place]   Precipitating or Alleviating factors (light/sound/sleep/caffeine): light and sound my headaches worse  Therapies tried and outcome: Ibuprofen (Advil, Motrin) and Tylenol              Outcome - usually effective  Frequent/daily pain medication use: no  HA tolerable  HA worse with less sleep  Has good ergonomically correct work station.     Musculoskeletal problem/pain  Onset/Duration: about a year  Description  Location: hand - bilateral  Joint Swelling: YES  Redness: no  Pain: YES- mild  Warmth: no  Intensity:  mild  Progression of Symptoms:  worsening  Accompanying signs and symptoms:   Fevers: no  Numbness/tingling/weakness: no  History  Trauma to the area: no  Recent illness:  no  Previous similar problem: YES  Previous evaluation:  YES- was seen at [facility] last Tuesday- Susandeon Soni was told may have osteoarthritis bilateral hands- no treatment plan and no follow ups scheduled   Precipitating or alleviating factors:  Aggravating factors include: none  Therapies tried and outcome: nothing    Consult note pending  Thinking of OA and maybe some FM  Had good visit with Rheum and happy with results             Review of Systems          Objective          Vitals:  No vitals were obtained today due to virtual visit.    healthy, alert and no distress  PSYCH: Alert and oriented times 3; coherent speech, normal   rate and volume, able to articulate logical thoughts, able   to abstract reason, no tangential thoughts, no hallucinations   or delusions  Her affect is normal  RESP: No cough, no audible wheezing, able to talk in full  sentences  Remainder of exam unable to be completed due to telephone visits            Assessment/Plan:    Assessment & Plan     Adjustment disorder with depressed mood  Some better. Max dose of zoloft.  Continue current medications and behavioral changes.   No change in meds. - will watch. Call if worsens.     Chronic tension-type headache, intractable  Discussed. Discussed in length conservative measures of OTC medications for pain, Ice/Heat, elevation and the concept of R.I.C.E.. Continue behavioral changes and proper body mechanics to allow for healing. Follow up as directed.   Minimal use of NSAIDS discussed.  Stress /tension relief discussed    Multiple joint pain  OA vs FM vs combination. Discussed some OTC supplements Glucosamine vs Tumeric.  Discussed in length conservative measures of OTC medications for pain, Ice/Heat, elevation and the concept of R.I.C.E.. Continue behavioral changes and proper body mechanics to allow for healing. Follow up as directed.       KHADAR positive  Rheum felt it is false positive. Will watch for now.               No follow-ups on file.    Arnav Lawrence MD  [facility]    Phone call duration:  12 minutes

## 2020-11-10 ENCOUNTER — TRANSFERRED RECORDS (OUTPATIENT)
Dept: HEALTH INFORMATION MANAGEMENT | Facility: CLINIC | Age: 45
End: 2020-11-10

## 2020-11-17 ENCOUNTER — VIRTUAL VISIT (OUTPATIENT)
Dept: FAMILY MEDICINE | Facility: OTHER | Age: 45
End: 2020-11-17
Attending: FAMILY MEDICINE
Payer: COMMERCIAL

## 2020-11-17 DIAGNOSIS — M25.50 MULTIPLE JOINT PAIN: ICD-10-CM

## 2020-11-17 DIAGNOSIS — G44.221 CHRONIC TENSION-TYPE HEADACHE, INTRACTABLE: ICD-10-CM

## 2020-11-17 DIAGNOSIS — F43.21 ADJUSTMENT DISORDER WITH DEPRESSED MOOD: Primary | ICD-10-CM

## 2020-11-17 DIAGNOSIS — R76.8 ANA POSITIVE: ICD-10-CM

## 2020-11-17 PROCEDURE — 99213 OFFICE O/P EST LOW 20 MIN: CPT | Mod: 95 | Performed by: FAMILY MEDICINE

## 2020-11-17 ASSESSMENT — ANXIETY QUESTIONNAIRES
6. BECOMING EASILY ANNOYED OR IRRITABLE: MORE THAN HALF THE DAYS
2. NOT BEING ABLE TO STOP OR CONTROL WORRYING: NOT AT ALL
GAD7 TOTAL SCORE: 3
1. FEELING NERVOUS, ANXIOUS, OR ON EDGE: NOT AT ALL
7. FEELING AFRAID AS IF SOMETHING AWFUL MIGHT HAPPEN: NOT AT ALL
4. TROUBLE RELAXING: SEVERAL DAYS
5. BEING SO RESTLESS THAT IT IS HARD TO SIT STILL: NOT AT ALL
3. WORRYING TOO MUCH ABOUT DIFFERENT THINGS: NOT AT ALL

## 2020-11-17 ASSESSMENT — PAIN SCALES - GENERAL: PAINLEVEL: NO PAIN (0)

## 2020-11-17 ASSESSMENT — PATIENT HEALTH QUESTIONNAIRE - PHQ9: SUM OF ALL RESPONSES TO PHQ QUESTIONS 1-9: 6

## 2020-11-17 NOTE — NURSING NOTE
"Chief Complaint   Patient presents with     Recheck Medication       Initial There were no vitals taken for this visit. Estimated body mass index is 23.96 kg/m  as calculated from the following:    Height as of 10/8/20: 1.651 m (5' 5\").    Weight as of 10/8/20: 65.3 kg (144 lb).  Medication Reconciliation: complete     Jonna Szymanski LPN  "

## 2020-11-18 ASSESSMENT — ANXIETY QUESTIONNAIRES: GAD7 TOTAL SCORE: 3

## 2021-02-25 ENCOUNTER — MEDICAL CORRESPONDENCE (OUTPATIENT)
Dept: MRI IMAGING | Facility: HOSPITAL | Age: 46
End: 2021-02-25

## 2021-03-17 ENCOUNTER — HOSPITAL ENCOUNTER (OUTPATIENT)
Dept: MRI IMAGING | Facility: HOSPITAL | Age: 46
Discharge: HOME | End: 2021-03-17
Attending: ORTHOPAEDIC SURGERY | Admitting: ORTHOPAEDIC SURGERY
Payer: COMMERCIAL

## 2021-03-17 DIAGNOSIS — M25.512 LEFT SHOULDER PAIN: ICD-10-CM

## 2021-03-17 DIAGNOSIS — R29.898 WEAKNESS OF UPPER EXTREMITY: ICD-10-CM

## 2021-03-17 DIAGNOSIS — M25.60 DECREASED RANGE OF MOTION: ICD-10-CM

## 2021-03-17 PROCEDURE — 73221 MRI JOINT UPR EXTREM W/O DYE: CPT | Mod: LT

## 2021-03-25 ENCOUNTER — TRANSFERRED RECORDS (OUTPATIENT)
Dept: HEALTH INFORMATION MANAGEMENT | Facility: CLINIC | Age: 46
End: 2021-03-25

## 2021-05-19 ENCOUNTER — MYC MEDICAL ADVICE (OUTPATIENT)
Dept: FAMILY MEDICINE | Facility: OTHER | Age: 46
End: 2021-05-19

## 2021-06-10 ENCOUNTER — MYC MEDICAL ADVICE (OUTPATIENT)
Dept: FAMILY MEDICINE | Facility: OTHER | Age: 46
End: 2021-06-10

## 2021-06-10 ENCOUNTER — TELEPHONE (OUTPATIENT)
Dept: FAMILY MEDICINE | Facility: OTHER | Age: 46
End: 2021-06-10

## 2021-06-10 NOTE — TELEPHONE ENCOUNTER
Spoke with mom and informed her per Dr. Romero that as long as patient was still urinating she would be ok . Mom stated that they were currently in the emergency room .

## 2021-08-08 ENCOUNTER — HEALTH MAINTENANCE LETTER (OUTPATIENT)
Age: 46
End: 2021-08-08

## 2021-09-26 DIAGNOSIS — F43.21 ADJUSTMENT DISORDER WITH DEPRESSED MOOD: ICD-10-CM

## 2021-09-27 RX ORDER — SERTRALINE HYDROCHLORIDE 100 MG/1
TABLET, FILM COATED ORAL
Qty: 180 TABLET | Refills: 3 | Status: SHIPPED | OUTPATIENT
Start: 2021-09-27 | End: 2022-09-22

## 2021-09-27 NOTE — TELEPHONE ENCOUNTER
sertraline      Last Written Prescription Date:  9/28/20  Last Fill Quantity: 180,   # refills: 3  Last Office Visit: 11/17/20  Future Office visit:

## 2021-10-03 ENCOUNTER — HEALTH MAINTENANCE LETTER (OUTPATIENT)
Age: 46
End: 2021-10-03

## 2021-10-20 ENCOUNTER — TRANSCRIBE ORDERS (OUTPATIENT)
Dept: FAMILY MEDICINE | Facility: OTHER | Age: 46
End: 2021-10-20

## 2021-10-20 DIAGNOSIS — Z12.31 SCREENING MAMMOGRAM, ENCOUNTER FOR: Primary | ICD-10-CM

## 2021-10-20 DIAGNOSIS — Z12.31 VISIT FOR SCREENING MAMMOGRAM: ICD-10-CM

## 2021-11-08 ENCOUNTER — ALLIED HEALTH/NURSE VISIT (OUTPATIENT)
Dept: FAMILY MEDICINE | Facility: OTHER | Age: 46
End: 2021-11-08
Attending: FAMILY MEDICINE
Payer: COMMERCIAL

## 2021-11-08 DIAGNOSIS — Z23 NEED FOR PROPHYLACTIC VACCINATION AND INOCULATION AGAINST INFLUENZA: Primary | ICD-10-CM

## 2021-11-08 PROCEDURE — 90471 IMMUNIZATION ADMIN: CPT

## 2021-11-08 PROCEDURE — 90686 IIV4 VACC NO PRSV 0.5 ML IM: CPT

## 2021-11-16 NOTE — PROGRESS NOTES
SUBJECTIVE:   CC: Deanne Yee is an 46 year old woman who presents for preventive health visit.       Patient has been advised of split billing requirements and indicates understanding: Yes  Healthy Habits:    Getting at least 3 servings of Calcium per day:  Yes    Bi-annual eye exam:  Yes    Dental care twice a year:  Yes    Sleep apnea or symptoms of sleep apnea:  Daytime drowsiness    Diet:  Regular (no restrictions)    Frequency of exercise:  4-5 days/week    Duration of exercise:  15-30 minutes    Taking medications regularly:  Yes    Medication side effects:  Not applicable    PHQ-2 Total Score:    Additional concerns today:  Yes          Depression Followup    How are you doing with your depression since your last visit? Improved     Are you having other symptoms that might be associated with depression? No    Have you had a significant life event?  No     Are you feeling anxious or having panic attacks?   No    Do you have any concerns with your use of alcohol or other drugs? No    Social History     Tobacco Use     Smoking status: Never Smoker     Smokeless tobacco: Never Used   Substance Use Topics     Alcohol use: Yes     Alcohol/week: 0.0 standard drinks     Comment: 1 beer daily     Drug use: No     PHQ 10/8/2020 11/17/2020 11/22/2021   PHQ-9 Total Score 7 6 1   Q9: Thoughts of better off dead/self-harm past 2 weeks Not at all Not at all Not at all     MATHEW-7 SCORE 10/8/2020 11/17/2020 11/22/2021   Total Score 3 3 1     Last PHQ-9 11/22/2021   1.  Little interest or pleasure in doing things 0   2.  Feeling down, depressed, or hopeless 0   3.  Trouble falling or staying asleep, or sleeping too much 0   4.  Feeling tired or having little energy 1   5.  Poor appetite or overeating 0   6.  Feeling bad about yourself 0   7.  Trouble concentrating 0   8.  Moving slowly or restless 0   Q9: Thoughts of better off dead/self-harm past 2 weeks 0   PHQ-9 Total Score 1   Difficulty at work, home, or with  people Somewhat difficult     MATHEW-7  11/22/2021   1. Feeling nervous, anxious, or on edge 0   2. Not being able to stop or control worrying 0   3. Worrying too much about different things 0   4. Trouble relaxing 0   5. Being so restless that it is hard to sit still 0   6. Becoming easily annoyed or irritable 1   7. Feeling afraid, as if something awful might happen 0   MATHEW-7 Total Score 1   If you checked any problems, how difficult have they made it for you to do your work, take care of things at home, or get along with other people? Not difficult at all         Musculoskeletal problem/pain      Duration: last snow storm    Description  Location: right hand    Intensity:  moderate    Accompanying signs and symptoms: none    History  Previous similar problem: no   Previous evaluation:  none    Precipitating or alleviating factors:  Trauma or overuse: YES- shoveling  Aggravating factors include: positions    Therapies tried and outcome: nothing  Shoveled lots of heavy snow for long time  Tender over ulnar palm of hand with occasional zingers    0956}    Today's PHQ-2 Score:   PHQ-2 ( 1999 [employer]) 10/7/2020   Q1: Little interest or pleasure in doing things 2   Q2: Feeling down, depressed or hopeless 1   PHQ-2 Score 3   Q1: Little interest or pleasure in doing things More than half the days   Q2: Feeling down, depressed or hopeless Several days   PHQ-2 Score 3       Abuse: Current or Past (Physical, Sexual or Emotional) - No  Do you feel safe in your environment? Yes    Have you ever done Advance Care Planning? (For example, a Health Directive, POLST, or a discussion with a medical provider or your loved ones about your wishes): No, advance care planning information given to patient to review.  Patient declined advance care planning discussion at this time.    Social History     Tobacco Use     Smoking status: Never Smoker     Smokeless tobacco: Never Used   Substance Use Topics     Alcohol use: Yes     Alcohol/week:  0.0 standard drinks     Comment: 1 beer daily     If you drink alcohol do you typically have >3 drinks per day or >7 drinks per week? No    Alcohol Use 10/7/2020   Prescreen: >3 drinks/day or >7 drinks/week? No       Reviewed orders with patient.  Reviewed health maintenance and updated orders accordingly -   Labs reviewed in EPIC    Breast Cancer Screening:  Any new diagnosis of family breast, ovarian, or bowel cancer?     FHS-7: No flowsheet data found.      Pertinent mammograms are reviewed under the imaging tab.    History of abnormal Pap smear:   PAP / HPV Latest Ref Rng & Units 10/8/2020 10/21/2015 1/29/2014   PAP (Historical) - NIL NIL NIL   HPV16 NEG:Negative Negative - -   HPV18 NEG:Negative Negative - -   HRHPV NEG:Negative Negative - -     Reviewed and updated as needed this visit by clinical staff                Reviewed and updated as needed this visit by Provider               Past Medical History:   Diagnosis Date     Cervical dysplasia 01/12/2012    Last pap 8/2/17 Normal in [place]. No HPV done.     MATHEW (generalized anxiety disorder)     started zoloft while pregnant 2016     GERD (gastroesophageal reflux disease)     while pregnant     Hx of migraine headaches 01/12/2012     Hypothyroidism      Preeclampsia     post partum     Reactive airway disease 01/12/2012     S/P LEEP (loop electrosurgical excision procedure) 01/12/2012     Sleep Pattern Disturbance 01/25/2013      Past Surgical History:   Procedure Laterality Date     COLONOSCOPY  04/2017    [facility] - normal     DENTAL SURGERY      wisdom teeth removal     EGD  04/2017    [facility] - unremarkable     Loop electrosurgical excision procedure  01/01/2006       Review of Systems  CONSTITUTIONAL: NEGATIVE for fever, chills, change in weight  INTEGUMENTARU/SKIN: NEGATIVE for worrisome rashes, moles or lesions  EYES: NEGATIVE for vision changes or irritation  ENT: NEGATIVE for ear, mouth and throat problems  RESP: NEGATIVE for significant cough or  "SOB  BREAST: NEGATIVE for masses, tenderness or discharge  CV: NEGATIVE for chest pain, palpitations or peripheral edema  GI: NEGATIVE for nausea, abdominal pain, heartburn, or change in bowel habits  : NEGATIVE for unusual urinary or vaginal symptoms. Periods are regular.  MUSCULOSKELETAL: NEGATIVE for significant arthralgias or myalgia  NEURO: NEGATIVE for weakness, dizziness or paresthesias  PSYCHIATRIC: NEGATIVE for changes in mood or affect     OBJECTIVE:   /68   Pulse 88   Temp 98.2  F (36.8  C)   Ht 1.651 m (5' 5\")   Wt 66.7 kg (147 lb)   LMP 11/14/2021   SpO2 99%   BMI 24.46 kg/m    Physical Exam  GENERAL: healthy, alert and no distress  EYES: Eyes grossly normal to inspection, PERRL and conjunctivae and sclerae normal  HENT: ear canals and TM's normal, nose and mouth without ulcers or lesions  NECK: no adenopathy, no asymmetry, masses, or scars and thyroid normal to palpation  RESP: lungs clear to auscultation - no rales, rhonchi or wheezes  BREAST: normal without masses, tenderness or nipple discharge and no palpable axillary masses or adenopathy  CV: regular rate and rhythm, normal S1 S2, no S3 or S4, no murmur, click or rub, no peripheral edema and peripheral pulses strong  ABDOMEN: soft, nontender, no hepatosplenomegaly, no masses and bowel sounds normal   (female): normal female external genitalia, normal urethral meatus, vaginal mucosa pink, moist, well rugated, and normal cervix/adnexa/uterus without masses or discharge- no pap done just bimanual   MS: no gross musculoskeletal defects noted, no edema  SKIN: no suspicious lesions or rashes  NEURO: Normal strength and tone, mentation intact and speech normal  PSYCH: mentation appears normal, affect normal/bright  LYMPH: no cervical, supraclavicular, axillary, or inguinal adenopathy    Results for orders placed or performed in visit on 11/22/21   Comprehensive metabolic panel     Status: Abnormal   Result Value Ref Range    Sodium 139 " 133 - 144 mmol/L    Potassium 3.8 3.4 - 5.3 mmol/L    Chloride 107 94 - 109 mmol/L    Carbon Dioxide (CO2) 29 20 - 32 mmol/L    Anion Gap 3 3 - 14 mmol/L    Urea Nitrogen 12 7 - 30 mg/dL    Creatinine 0.84 0.52 - 1.04 mg/dL    Calcium 8.4 (L) 8.5 - 10.1 mg/dL    Glucose 102 (H) 70 - 99 mg/dL    Alkaline Phosphatase 59 40 - 150 U/L    AST 19 0 - 45 U/L    ALT 25 0 - 50 U/L    Protein Total 8.2 6.8 - 8.8 g/dL    Albumin 4.4 3.4 - 5.0 g/dL    Bilirubin Total 0.3 0.2 - 1.3 mg/dL    GFR Estimate 84 >60 mL/min/1.73m2   Lipid Profile     Status: None   Result Value Ref Range    Cholesterol 196 <200 mg/dL    Triglycerides 124 <150 mg/dL    Direct Measure HDL 77 >=50 mg/dL    LDL Cholesterol Calculated 94 <=100 mg/dL    Non HDL Cholesterol 119 <130 mg/dL    Patient Fasting > 8hrs? No     Narrative    Cholesterol  Desirable:  <200 mg/dL    Triglycerides  Normal:  Less than 150 mg/dL  Borderline High:  150-199 mg/dL  High:  200-499 mg/dL  Very High:  Greater than or equal to 500 mg/dL    Direct Measure HDL  Female:  Greater than or equal to 50 mg/dL   Male:  Greater than or equal to 40 mg/dL    LDL Cholesterol  Desirable:  <100mg/dL  Above Desirable:  100-129 mg/dL   Borderline High:  130-159 mg/dL   High:  160-189 mg/dL   Very High:  >= 190 mg/dL    Non HDL Cholesterol  Desirable:  130 mg/dL  Above Desirable:  130-159 mg/dL  Borderline High:  160-189 mg/dL  High:  190-219 mg/dL  Very High:  Greater than or equal to 220 mg/dL   TSH     Status: Normal   Result Value Ref Range    TSH 1.71 0.40 - 4.00 mU/L   CBC with platelets and differential     Status: None   Result Value Ref Range    WBC Count 7.1 4.0 - 11.0 10e3/uL    RBC Count 4.54 3.80 - 5.20 10e6/uL    Hemoglobin 13.7 11.7 - 15.7 g/dL    Hematocrit 40.8 35.0 - 47.0 %    MCV 90 78 - 100 fL    MCH 30.2 26.5 - 33.0 pg    MCHC 33.6 31.5 - 36.5 g/dL    RDW 12.0 10.0 - 15.0 %    Platelet Count 311 150 - 450 10e3/uL    % Neutrophils 66 %    % Lymphocytes 25 %    % Monocytes 8 %     % Eosinophils 0 %    % Basophils 1 %    % Immature Granulocytes 0 %    NRBCs per 100 WBC 0 <1 /100    Absolute Neutrophils 4.7 1.6 - 8.3 10e3/uL    Absolute Lymphocytes 1.8 0.8 - 5.3 10e3/uL    Absolute Monocytes 0.6 0.0 - 1.3 10e3/uL    Absolute Eosinophils 0.0 0.0 - 0.7 10e3/uL    Absolute Basophils 0.1 0.0 - 0.2 10e3/uL    Absolute Immature Granulocytes 0.0 <=0.0 10e3/uL    Absolute NRBCs 0.0 10e3/uL   CBC with Platelets & Differential     Status: None    Narrative    The following orders were created for panel order CBC with Platelets & Differential.  Procedure                               Abnormality         Status                     ---------                               -----------         ------                     CBC with platelets and d...[[id number]]                      Final result                 Please view results for these tests on the individual orders.   .    ASSESSMENT/PLAN:   (Z00.00) Routine general medical examination at a health care facility  (primary encounter diagnosis)  Comment: overall doing well.   Plan: CBC with Platelets & Differential,         Comprehensive metabolic panel, Lipid Profile,         TSH        Mammogram next week  Immunizations UTD but needs booster    (E03.9) Acquired hypothyroidism  Comment: stable.   Plan: TSH        Continue current medications and behavioral changes.     (Z12.31) Encounter for screening mammogram for breast cancer  Comment: as above.   Plan: mammogram next week    (F43.21) Adjustment disorder with depressed mood  Comment: stable on meds   Plan: Continue - maybe long term??    Left hand pain - soft tissue and nerve contusion. Discussed in length conservative measures of OTC medications for pain, Ice/Heat, elevation and the concept of R.I.C.E.. Continue behavioral changes and proper body mechanics to allow for healing. Follow up as directed.       Patient has been advised of split billing requirements and indicates understanding:  "  COUNSELING:  Reviewed preventive health counseling, as reflected in patient instructions       Regular exercise       Healthy diet/nutrition    Estimated body mass index is 23.96 kg/m  as calculated from the following:    Height as of 10/8/20: 1.651 m (5' 5\").    Weight as of 10/8/20: 65.3 kg (144 lb).        She reports that she has never smoked. She has never used smokeless tobacco.      Counseling Resources:  ATP IV Guidelines  Pooled Cohorts Equation Calculator  Breast Cancer Risk Calculator  BRCA-Related Cancer Risk Assessment: FHS-7 Tool  FRAX Risk Assessment  ICSI Preventive Guidelines  Dietary Guidelines for Americans, 2010  USDA's MyPlate  ASA Prophylaxis  Lung CA Screening    Arnav Lawrence MD  [facility] [place]  "

## 2021-11-22 ENCOUNTER — OFFICE VISIT (OUTPATIENT)
Dept: FAMILY MEDICINE | Facility: OTHER | Age: 46
End: 2021-11-22
Attending: FAMILY MEDICINE
Payer: COMMERCIAL

## 2021-11-22 ENCOUNTER — LAB (OUTPATIENT)
Dept: LAB | Facility: OTHER | Age: 46
End: 2021-11-22
Attending: FAMILY MEDICINE
Payer: COMMERCIAL

## 2021-11-22 VITALS
WEIGHT: 147 LBS | TEMPERATURE: 98.2 F | OXYGEN SATURATION: 99 % | HEIGHT: 65 IN | HEART RATE: 88 BPM | SYSTOLIC BLOOD PRESSURE: 118 MMHG | DIASTOLIC BLOOD PRESSURE: 68 MMHG | BODY MASS INDEX: 24.49 KG/M2

## 2021-11-22 DIAGNOSIS — M79.642 PAIN OF LEFT HAND: ICD-10-CM

## 2021-11-22 DIAGNOSIS — E03.9 ACQUIRED HYPOTHYROIDISM: ICD-10-CM

## 2021-11-22 DIAGNOSIS — F43.21 ADJUSTMENT DISORDER WITH DEPRESSED MOOD: ICD-10-CM

## 2021-11-22 DIAGNOSIS — Z12.31 ENCOUNTER FOR SCREENING MAMMOGRAM FOR BREAST CANCER: ICD-10-CM

## 2021-11-22 DIAGNOSIS — Z00.00 ROUTINE GENERAL MEDICAL EXAMINATION AT A HEALTH CARE FACILITY: ICD-10-CM

## 2021-11-22 DIAGNOSIS — Z00.00 ROUTINE GENERAL MEDICAL EXAMINATION AT A HEALTH CARE FACILITY: Primary | ICD-10-CM

## 2021-11-22 PROBLEM — R10.84 ABDOMINAL PAIN, GENERALIZED: Status: RESOLVED | Noted: 2017-04-10 | Resolved: 2021-11-22

## 2021-11-22 PROBLEM — R07.89 CHEST WALL PAIN: Status: RESOLVED | Noted: 2017-04-10 | Resolved: 2021-11-22

## 2021-11-22 LAB
ALBUMIN SERPL-MCNC: 4.4 G/DL (ref 3.4–5)
ALP SERPL-CCNC: 59 U/L (ref 40–150)
ALT SERPL W P-5'-P-CCNC: 25 U/L (ref 0–50)
ANION GAP SERPL CALCULATED.3IONS-SCNC: 3 MMOL/L (ref 3–14)
AST SERPL W P-5'-P-CCNC: 19 U/L (ref 0–45)
BASOPHILS # BLD AUTO: 0.1 10E3/UL (ref 0–0.2)
BASOPHILS NFR BLD AUTO: 1 %
BILIRUB SERPL-MCNC: 0.3 MG/DL (ref 0.2–1.3)
BUN SERPL-MCNC: 12 MG/DL (ref 7–30)
CALCIUM SERPL-MCNC: 8.4 MG/DL (ref 8.5–10.1)
CHLORIDE BLD-SCNC: 107 MMOL/L (ref 94–109)
CHOLEST SERPL-MCNC: 196 MG/DL
CO2 SERPL-SCNC: 29 MMOL/L (ref 20–32)
CREAT SERPL-MCNC: 0.84 MG/DL (ref 0.52–1.04)
EOSINOPHIL # BLD AUTO: 0 10E3/UL (ref 0–0.7)
EOSINOPHIL NFR BLD AUTO: 0 %
ERYTHROCYTE [DISTWIDTH] IN BLOOD BY AUTOMATED COUNT: 12 % (ref 10–15)
FASTING STATUS PATIENT QL REPORTED: NO
GFR SERPL CREATININE-BSD FRML MDRD: 84 ML/MIN/1.73M2
GLUCOSE BLD-MCNC: 102 MG/DL (ref 70–99)
HCT VFR BLD AUTO: 40.8 % (ref 35–47)
HDLC SERPL-MCNC: 77 MG/DL
HGB BLD-MCNC: 13.7 G/DL (ref 11.7–15.7)
IMM GRANULOCYTES # BLD: 0 10E3/UL
IMM GRANULOCYTES NFR BLD: 0 %
LDLC SERPL CALC-MCNC: 94 MG/DL
LYMPHOCYTES # BLD AUTO: 1.8 10E3/UL (ref 0.8–5.3)
LYMPHOCYTES NFR BLD AUTO: 25 %
MCH RBC QN AUTO: 30.2 PG (ref 26.5–33)
MCHC RBC AUTO-ENTMCNC: 33.6 G/DL (ref 31.5–36.5)
MCV RBC AUTO: 90 FL (ref 78–100)
MONOCYTES # BLD AUTO: 0.6 10E3/UL (ref 0–1.3)
MONOCYTES NFR BLD AUTO: 8 %
NEUTROPHILS # BLD AUTO: 4.7 10E3/UL (ref 1.6–8.3)
NEUTROPHILS NFR BLD AUTO: 66 %
NONHDLC SERPL-MCNC: 119 MG/DL
NRBC # BLD AUTO: 0 10E3/UL
NRBC BLD AUTO-RTO: 0 /100
PLATELET # BLD AUTO: 311 10E3/UL (ref 150–450)
POTASSIUM BLD-SCNC: 3.8 MMOL/L (ref 3.4–5.3)
PROT SERPL-MCNC: 8.2 G/DL (ref 6.8–8.8)
RBC # BLD AUTO: 4.54 10E6/UL (ref 3.8–5.2)
SODIUM SERPL-SCNC: 139 MMOL/L (ref 133–144)
TRIGL SERPL-MCNC: 124 MG/DL
TSH SERPL DL<=0.005 MIU/L-ACNC: 1.71 MU/L (ref 0.4–4)
WBC # BLD AUTO: 7.1 10E3/UL (ref 4–11)

## 2021-11-22 PROCEDURE — 99396 PREV VISIT EST AGE 40-64: CPT | Performed by: FAMILY MEDICINE

## 2021-11-22 PROCEDURE — 36415 COLL VENOUS BLD VENIPUNCTURE: CPT

## 2021-11-22 PROCEDURE — 80050 GENERAL HEALTH PANEL: CPT

## 2021-11-22 PROCEDURE — 80061 LIPID PANEL: CPT

## 2021-11-22 ASSESSMENT — ANXIETY QUESTIONNAIRES
1. FEELING NERVOUS, ANXIOUS, OR ON EDGE: NOT AT ALL
7. FEELING AFRAID AS IF SOMETHING AWFUL MIGHT HAPPEN: NOT AT ALL
3. WORRYING TOO MUCH ABOUT DIFFERENT THINGS: NOT AT ALL
IF YOU CHECKED OFF ANY PROBLEMS ON THIS QUESTIONNAIRE, HOW DIFFICULT HAVE THESE PROBLEMS MADE IT FOR YOU TO DO YOUR WORK, TAKE CARE OF THINGS AT HOME, OR GET ALONG WITH OTHER PEOPLE: NOT DIFFICULT AT ALL
4. TROUBLE RELAXING: NOT AT ALL
GAD7 TOTAL SCORE: 1
2. NOT BEING ABLE TO STOP OR CONTROL WORRYING: NOT AT ALL
5. BEING SO RESTLESS THAT IT IS HARD TO SIT STILL: NOT AT ALL
6. BECOMING EASILY ANNOYED OR IRRITABLE: SEVERAL DAYS

## 2021-11-22 ASSESSMENT — PAIN SCALES - GENERAL: PAINLEVEL: MODERATE PAIN (4)

## 2021-11-22 ASSESSMENT — MIFFLIN-ST. JEOR: SCORE: 1312.67

## 2021-11-22 NOTE — NURSING NOTE
"Chief Complaint   Patient presents with     Physical       Initial /68   Pulse 88   Temp 98.2  F (36.8  C)   Ht 1.651 m (5' 5\")   Wt 66.7 kg (147 lb)   LMP 11/14/2021   SpO2 99%   BMI 24.46 kg/m   Estimated body mass index is 24.46 kg/m  as calculated from the following:    Height as of this encounter: 1.651 m (5' 5\").    Weight as of this encounter: 66.7 kg (147 lb).  Medication Reconciliation: complete  Wilber Kevin LPN  "

## 2021-11-23 ASSESSMENT — PATIENT HEALTH QUESTIONNAIRE - PHQ9: SUM OF ALL RESPONSES TO PHQ QUESTIONS 1-9: 1

## 2021-11-23 ASSESSMENT — ANXIETY QUESTIONNAIRES: GAD7 TOTAL SCORE: 1

## 2022-01-12 ENCOUNTER — ANCILLARY PROCEDURE (OUTPATIENT)
Dept: MAMMOGRAPHY | Facility: OTHER | Age: 47
End: 2022-01-12
Attending: FAMILY MEDICINE
Payer: COMMERCIAL

## 2022-01-12 ENCOUNTER — TELEPHONE (OUTPATIENT)
Dept: MAMMOGRAPHY | Facility: OTHER | Age: 47
End: 2022-01-12

## 2022-01-12 DIAGNOSIS — Z12.31 VISIT FOR SCREENING MAMMOGRAM: ICD-10-CM

## 2022-01-12 PROCEDURE — 77067 SCR MAMMO BI INCL CAD: CPT | Mod: TC | Performed by: RADIOLOGY

## 2022-01-12 PROCEDURE — 77063 BREAST TOMOSYNTHESIS BI: CPT | Mod: TC | Performed by: RADIOLOGY

## 2022-01-18 DIAGNOSIS — E03.9 ACQUIRED HYPOTHYROIDISM: ICD-10-CM

## 2022-01-18 RX ORDER — LEVOTHYROXINE SODIUM 50 UG/1
TABLET ORAL
Qty: 90 TABLET | Refills: 3 | Status: SHIPPED | OUTPATIENT
Start: 2022-01-18 | End: 2023-01-16

## 2022-01-18 NOTE — TELEPHONE ENCOUNTER
Levothyroxine  Last Written Prescription Date: 10/20/21  Last Fill Quantity: 90 # of Refills: 0  Last Office Visit: 11/22/21

## 2022-09-04 ENCOUNTER — HEALTH MAINTENANCE LETTER (OUTPATIENT)
Age: 47
End: 2022-09-04

## 2022-09-21 DIAGNOSIS — F43.21 ADJUSTMENT DISORDER WITH DEPRESSED MOOD: ICD-10-CM

## 2022-09-22 RX ORDER — SERTRALINE HYDROCHLORIDE 100 MG/1
TABLET, FILM COATED ORAL
Qty: 180 TABLET | Refills: 0 | Status: SHIPPED | OUTPATIENT
Start: 2022-09-22 | End: 2022-12-28

## 2022-09-22 NOTE — TELEPHONE ENCOUNTER
Zoloft      Last Written Prescription Date:  9.27.2021  Last Fill Quantity: 180,   # refills: 3  Last Office Visit: 11.22.2021  Future Office visit:       Routing refill request to provider for review/approval because:   SSRIs Protocol Failed 09/21/2022 11:41 PM   Protocol Details  PHQ-9 score less than 5 in past 6 months    Recent (6 mo) or future (30 days) visit within the authorizing provider's specialty        Melisa Agarwal RN

## 2022-11-16 NOTE — TELEPHONE ENCOUNTER
Outgoing call regarding orencia refill; per pt, she has moved to [facility]; per jean Canales, she will disenroll pt   Pt was notified that rx was filled yesterday.

## 2022-12-27 DIAGNOSIS — F43.21 ADJUSTMENT DISORDER WITH DEPRESSED MOOD: ICD-10-CM

## 2022-12-28 ENCOUNTER — TELEPHONE (OUTPATIENT)
Dept: FAMILY MEDICINE | Facility: OTHER | Age: 47
End: 2022-12-28

## 2022-12-28 RX ORDER — SERTRALINE HYDROCHLORIDE 100 MG/1
TABLET, FILM COATED ORAL
Qty: 180 TABLET | Refills: 3 | Status: SHIPPED | OUTPATIENT
Start: 2022-12-28 | End: 2023-05-16

## 2022-12-28 NOTE — TELEPHONE ENCOUNTER
Attempt # 1  Outcome: Left Message   Comment: lvm for pt to call and schedule physical with pcp

## 2022-12-28 NOTE — TELEPHONE ENCOUNTER
SERTRALINE HCL TABS 100MG        Last Written Prescription Date:  9/22/22  Last Fill Quantity: 180   # refills: 0  Last Office Visit: 11/22/21  Future Office visit:       Routing refill request to provider for review/approval because:    SSRIs Protocol Failed 12/27/2022 08:46 AM   Protocol Details  PHQ-9 score less than 5 in past 6 months    Recent (6 mo) or future (30 days) visit within the authorizing provider's specialty     PHQ 10/8/2020 11/17/2020 11/22/2021   PHQ-9 Total Score 7 6 1   Q9: Thoughts of better off dead/self-harm past 2 weeks Not at all Not at all Not at all

## 2023-01-15 ENCOUNTER — HEALTH MAINTENANCE LETTER (OUTPATIENT)
Age: 48
End: 2023-01-15

## 2023-01-16 ENCOUNTER — TELEPHONE (OUTPATIENT)
Dept: FAMILY MEDICINE | Facility: OTHER | Age: 48
End: 2023-01-16

## 2023-01-16 NOTE — TELEPHONE ENCOUNTER
Attempt # 1  Outcome: Left Message   Comment: LVM FOR PT TO CALL AND SCHEDULE A PHYSICAL WITH NON FASTING LABS

## 2023-03-15 DIAGNOSIS — Z12.31 VISIT FOR SCREENING MAMMOGRAM: Primary | ICD-10-CM

## 2023-04-29 ENCOUNTER — HEALTH MAINTENANCE LETTER (OUTPATIENT)
Age: 48
End: 2023-04-29

## 2023-05-16 ENCOUNTER — OFFICE VISIT (OUTPATIENT)
Dept: FAMILY MEDICINE | Facility: OTHER | Age: 48
End: 2023-05-16
Attending: FAMILY MEDICINE
Payer: COMMERCIAL

## 2023-05-16 ENCOUNTER — ANCILLARY PROCEDURE (OUTPATIENT)
Dept: MAMMOGRAPHY | Facility: OTHER | Age: 48
End: 2023-05-16
Attending: FAMILY MEDICINE
Payer: COMMERCIAL

## 2023-05-16 ENCOUNTER — APPOINTMENT (OUTPATIENT)
Dept: LAB | Facility: OTHER | Age: 48
End: 2023-05-16
Attending: FAMILY MEDICINE
Payer: COMMERCIAL

## 2023-05-16 VITALS
SYSTOLIC BLOOD PRESSURE: 116 MMHG | BODY MASS INDEX: 23.13 KG/M2 | HEART RATE: 68 BPM | TEMPERATURE: 98.5 F | DIASTOLIC BLOOD PRESSURE: 72 MMHG | RESPIRATION RATE: 18 BRPM | WEIGHT: 139 LBS | OXYGEN SATURATION: 98 %

## 2023-05-16 DIAGNOSIS — F43.21 ADJUSTMENT DISORDER WITH DEPRESSED MOOD: ICD-10-CM

## 2023-05-16 DIAGNOSIS — Z12.31 ENCOUNTER FOR SCREENING MAMMOGRAM FOR BREAST CANCER: ICD-10-CM

## 2023-05-16 DIAGNOSIS — Z00.00 ROUTINE GENERAL MEDICAL EXAMINATION AT A HEALTH CARE FACILITY: Primary | ICD-10-CM

## 2023-05-16 DIAGNOSIS — Z12.31 VISIT FOR SCREENING MAMMOGRAM: ICD-10-CM

## 2023-05-16 DIAGNOSIS — E03.9 ACQUIRED HYPOTHYROIDISM: ICD-10-CM

## 2023-05-16 LAB
ALBUMIN SERPL BCG-MCNC: 4.7 G/DL (ref 3.5–5.2)
ALP SERPL-CCNC: 61 U/L (ref 35–104)
ALT SERPL W P-5'-P-CCNC: 17 U/L (ref 10–35)
ANION GAP SERPL CALCULATED.3IONS-SCNC: 8 MMOL/L (ref 7–15)
AST SERPL W P-5'-P-CCNC: 21 U/L (ref 10–35)
BASOPHILS # BLD AUTO: 0 10E3/UL (ref 0–0.2)
BASOPHILS NFR BLD AUTO: 1 %
BILIRUB SERPL-MCNC: 0.2 MG/DL
BUN SERPL-MCNC: 10.8 MG/DL (ref 6–20)
CALCIUM SERPL-MCNC: 9.1 MG/DL (ref 8.6–10)
CHLORIDE SERPL-SCNC: 103 MMOL/L (ref 98–107)
CREAT SERPL-MCNC: 0.76 MG/DL (ref 0.51–0.95)
DEPRECATED HCO3 PLAS-SCNC: 26 MMOL/L (ref 22–29)
EOSINOPHIL # BLD AUTO: 0 10E3/UL (ref 0–0.7)
EOSINOPHIL NFR BLD AUTO: 0 %
ERYTHROCYTE [DISTWIDTH] IN BLOOD BY AUTOMATED COUNT: 12.3 % (ref 10–15)
GFR SERPL CREATININE-BSD FRML MDRD: >90 ML/MIN/1.73M2
GLUCOSE SERPL-MCNC: 102 MG/DL (ref 70–99)
HCT VFR BLD AUTO: 38.7 % (ref 35–47)
HGB BLD-MCNC: 13.4 G/DL (ref 11.7–15.7)
IMM GRANULOCYTES # BLD: 0 10E3/UL
IMM GRANULOCYTES NFR BLD: 0 %
LYMPHOCYTES # BLD AUTO: 1.7 10E3/UL (ref 0.8–5.3)
LYMPHOCYTES NFR BLD AUTO: 26 %
MCH RBC QN AUTO: 30.4 PG (ref 26.5–33)
MCHC RBC AUTO-ENTMCNC: 34.6 G/DL (ref 31.5–36.5)
MCV RBC AUTO: 88 FL (ref 78–100)
MONOCYTES # BLD AUTO: 0.4 10E3/UL (ref 0–1.3)
MONOCYTES NFR BLD AUTO: 6 %
NEUTROPHILS # BLD AUTO: 4.6 10E3/UL (ref 1.6–8.3)
NEUTROPHILS NFR BLD AUTO: 67 %
NRBC # BLD AUTO: 0 10E3/UL
NRBC BLD AUTO-RTO: 0 /100
PLATELET # BLD AUTO: 267 10E3/UL (ref 150–450)
POTASSIUM SERPL-SCNC: 4.4 MMOL/L (ref 3.4–5.3)
PROT SERPL-MCNC: 7.6 G/DL (ref 6.4–8.3)
RBC # BLD AUTO: 4.41 10E6/UL (ref 3.8–5.2)
SODIUM SERPL-SCNC: 137 MMOL/L (ref 136–145)
TSH SERPL DL<=0.005 MIU/L-ACNC: 1.79 UIU/ML (ref 0.3–4.2)
WBC # BLD AUTO: 6.8 10E3/UL (ref 4–11)

## 2023-05-16 PROCEDURE — 80050 GENERAL HEALTH PANEL: CPT | Performed by: FAMILY MEDICINE

## 2023-05-16 PROCEDURE — 36415 COLL VENOUS BLD VENIPUNCTURE: CPT | Performed by: FAMILY MEDICINE

## 2023-05-16 PROCEDURE — 77063 BREAST TOMOSYNTHESIS BI: CPT | Mod: TC | Performed by: RADIOLOGY

## 2023-05-16 PROCEDURE — 77067 SCR MAMMO BI INCL CAD: CPT | Mod: TC | Performed by: RADIOLOGY

## 2023-05-16 PROCEDURE — 99396 PREV VISIT EST AGE 40-64: CPT | Performed by: FAMILY MEDICINE

## 2023-05-16 RX ORDER — LEVOTHYROXINE SODIUM 50 UG/1
50 TABLET ORAL DAILY
Qty: 90 TABLET | Refills: 0 | Status: SHIPPED | OUTPATIENT
Start: 2023-05-16 | End: 2023-08-22

## 2023-05-16 RX ORDER — SERTRALINE HYDROCHLORIDE 100 MG/1
TABLET, FILM COATED ORAL
Qty: 180 TABLET | Refills: 3 | Status: SHIPPED | OUTPATIENT
Start: 2023-05-16 | End: 2024-04-08

## 2023-05-16 ASSESSMENT — ENCOUNTER SYMPTOMS
CONSTIPATION: 0
MYALGIAS: 0
HEADACHES: 1
HEMATURIA: 0
SHORTNESS OF BREATH: 0
DIZZINESS: 0
CHILLS: 0
FEVER: 0
FREQUENCY: 0
COUGH: 0
WEAKNESS: 0
DYSURIA: 0
JOINT SWELLING: 0
NAUSEA: 0
PARESTHESIAS: 0
ARTHRALGIAS: 1
EYE PAIN: 0
HEARTBURN: 0
PALPITATIONS: 0
NERVOUS/ANXIOUS: 0
BREAST MASS: 0
DIARRHEA: 0
ABDOMINAL PAIN: 0
HEMATOCHEZIA: 0
SORE THROAT: 0

## 2023-05-16 ASSESSMENT — PAIN SCALES - GENERAL: PAINLEVEL: NO PAIN (0)

## 2023-05-16 NOTE — PROGRESS NOTES
SUBJECTIVE:   CC: Deanne is an 47 year old who presents for preventive health visit.       5/16/2023     1:43 PM   Additional Questions   Roomed by jah green   Accompanied by son     Patient has been advised of split billing requirements and indicates understanding: Yes  Healthy Habits:     Getting at least 3 servings of Calcium per day:  Yes    Bi-annual eye exam:  Yes    Dental care twice a year:  Yes    Sleep apnea or symptoms of sleep apnea:  None    Diet:  Regular (no restrictions)    Frequency of exercise:  4-5 days/week    Duration of exercise:  30-45 minutes    Taking medications regularly:  Yes    Medication side effects:  None    PHQ-2 Total Score: 0    Additional concerns today:  No          Depression Followup    How are you doing with your depression since your last visit? No change    Are you having other symptoms that might be associated with depression? No    Have you had a significant life event?  No     Are you feeling anxious or having panic attacks?   No    Do you have any concerns with your use of alcohol or other drugs? No    Social History     Tobacco Use     Smoking status: Never     Smokeless tobacco: Never   Vaping Use     Vaping status: Never Used   Substance Use Topics     Alcohol use: Yes     Alcohol/week: 0.0 standard drinks of alcohol     Comment: 1 beer daily     Drug use: No         10/8/2020    11:00 AM 11/17/2020     9:00 AM 11/22/2021     4:00 PM   PHQ   PHQ-9 Total Score 7 6 1   Q9: Thoughts of better off dead/self-harm past 2 weeks Not at all Not at all Not at all         10/8/2020    11:00 AM 11/17/2020     9:00 AM 11/22/2021     4:00 PM   MATHEW-7 SCORE   Total Score 3 3 1         11/22/2021     4:00 PM   Last PHQ-9   1.  Little interest or pleasure in doing things 0   2.  Feeling down, depressed, or hopeless 0   3.  Trouble falling or staying asleep, or sleeping too much 0   4.  Feeling tired or having little energy 1   5.  Poor appetite or overeating 0   6.  Feeling bad  about yourself 0   7.  Trouble concentrating 0   8.  Moving slowly or restless 0   Q9: Thoughts of better off dead/self-harm past 2 weeks 0   PHQ-9 Total Score 1   Difficulty at work, home, or with people Somewhat difficult       Suicide Assessment Five-step Evaluation and Treatment (SAFE-T)    Hypothyroidism Follow-up      Since last visit, patient describes the following symptoms: Weight stable, no hair loss, no skin changes, no constipation, no loose stools      Today's PHQ-2 Score:       2023     1:39 PM   PHQ-2 (  [employer])   Q1: Little interest or pleasure in doing things 0   Q2: Feeling down, depressed or hopeless 0   PHQ-2 Score 0   Q1: Little interest or pleasure in doing things Not at all   Q2: Feeling down, depressed or hopeless Not at all   PHQ-2 Score 0           Social History     Tobacco Use     Smoking status: Never     Smokeless tobacco: Never   Vaping Use     Vaping status: Never Used   Substance Use Topics     Alcohol use: Yes     Alcohol/week: 0.0 standard drinks of alcohol     Comment: 1 beer daily             2023     1:39 PM   Alcohol Use   Prescreen: >3 drinks/day or >7 drinks/week? No     Reviewed orders with patient.  Reviewed health maintenance and updated orders accordingly - Yes  Labs reviewed in EPIC    Breast Cancer Screenin/16/2023     1:39 PM   Breast CA Risk Assessment (FHS-7)   Do you have a family history of breast, colon, or ovarian cancer? No / Unknown           Pertinent mammograms are reviewed under the imaging tab.    History of abnormal Pap smear: NO - age 30-65 PAP every 5 years with negative HPV co-testing recommended      Latest Ref Rng & Units 10/8/2020    12:26 PM 10/21/2015    12:00 AM 2014    12:00 AM   PAP / HPV   PAP (Historical)  NIL   NIL   NIL     HPV 16 DNA NEG^Negative Negative       HPV 18 DNA NEG^Negative Negative       Other HR HPV NEG^Negative Negative         Reviewed and updated as needed this visit by clinical staff   Tobacco   Allergies  Meds              Reviewed and updated as needed this visit by Provider                 Past Medical History:   Diagnosis Date     Cervical dysplasia 01/12/2012    Last pap 8/2/17 Normal in [place]. No HPV done.     MATHEW (generalized anxiety disorder)     started zoloft while pregnant 2016     GERD (gastroesophageal reflux disease)     while pregnant     Hx of migraine headaches 01/12/2012     Hypothyroidism      Preeclampsia     post partum     Reactive airway disease 01/12/2012     S/P LEEP (loop electrosurgical excision procedure) 01/12/2012     Sleep Pattern Disturbance 01/25/2013      Past Surgical History:   Procedure Laterality Date     COLONOSCOPY  04/2017    [place] - normal     DENTAL SURGERY      wisdom teeth removal     EGD  04/2017    [place] - unremarkable     Loop electrosurgical excision procedure  01/01/2006       Review of Systems   Constitutional: Negative for chills and fever.   HENT: Negative for congestion, ear pain, hearing loss and sore throat.    Eyes: Negative for pain and visual disturbance.   Respiratory: Negative for cough and shortness of breath.    Cardiovascular: Negative for chest pain, palpitations and peripheral edema.   Gastrointestinal: Negative for abdominal pain, constipation, diarrhea, heartburn, hematochezia and nausea.   Breasts:  Negative for tenderness, breast mass and discharge.   Genitourinary: Negative for dysuria, frequency, genital sores, hematuria, pelvic pain, urgency, vaginal bleeding and vaginal discharge.   Musculoskeletal: Positive for arthralgias. Negative for joint swelling and myalgias.   Skin: Negative for rash.   Neurological: Positive for headaches. Negative for dizziness, weakness and paresthesias.   Psychiatric/Behavioral: Positive for mood changes. The patient is not nervous/anxious.      CONSTITUTIONAL: NEGATIVE for fever, chills, change in weight  INTEGUMENTARU/SKIN: NEGATIVE for worrisome rashes, moles or lesions  EYES: NEGATIVE for vision  changes or irritation  ENT: NEGATIVE for ear, mouth and throat problems  RESP: NEGATIVE for significant cough or SOB  BREAST: NEGATIVE for masses, tenderness or discharge  CV: NEGATIVE for chest pain, palpitations or peripheral edema  GI: NEGATIVE for nausea, abdominal pain, heartburn, or change in bowel habits  : NEGATIVE for unusual urinary or vaginal symptoms. Periods are regular.  MUSCULOSKELETAL: NEGATIVE for significant arthralgias or myalgia  NEURO: NEGATIVE for weakness, dizziness or paresthesias  PSYCHIATRIC: NEGATIVE for changes in mood or affect     OBJECTIVE:   /72 (BP Location: Left arm, Patient Position: Sitting, Cuff Size: Adult Regular)   Pulse 68   Temp 98.5  F (36.9  C) (Tympanic)   Resp 18   Wt 63 kg (139 lb)   LMP 05/11/2023 (Exact Date)   SpO2 98%   BMI 23.13 kg/m    Physical Exam  GENERAL: healthy, alert and no distress  EYES: Eyes grossly normal to inspection, PERRL and conjunctivae and sclerae normal  HENT: ear canals and TM's normal, nose and mouth without ulcers or lesions  NECK: no adenopathy, no asymmetry, masses, or scars and thyroid normal to palpation  RESP: lungs clear to auscultation - no rales, rhonchi or wheezes  BREAST: normal without masses, tenderness or nipple discharge and no palpable axillary masses or adenopathy  CV: regular rate and rhythm, normal S1 S2, no S3 or S4, no murmur, click or rub, no peripheral edema and peripheral pulses strong  ABDOMEN: soft, nontender, no hepatosplenomegaly, no masses and bowel sounds normal  MS: no gross musculoskeletal defects noted, no edema  SKIN: no suspicious lesions or rashes  NEURO: Normal strength and tone, mentation intact and speech normal  PSYCH: mentation appears normal, affect normal/bright  LYMPH: no cervical, supraclavicular, axillary, or inguinal adenopathy    Results for orders placed or performed in visit on 05/16/23   Comprehensive metabolic panel     Status: Abnormal   Result Value Ref Range    Sodium 137  136 - 145 mmol/L    Potassium 4.4 3.4 - 5.3 mmol/L    Chloride 103 98 - 107 mmol/L    Carbon Dioxide (CO2) 26 22 - 29 mmol/L    Anion Gap 8 7 - 15 mmol/L    Urea Nitrogen 10.8 6.0 - 20.0 mg/dL    Creatinine 0.76 0.51 - 0.95 mg/dL    Calcium 9.1 8.6 - 10.0 mg/dL    Glucose 102 (H) 70 - 99 mg/dL    Alkaline Phosphatase 61 35 - 104 U/L    AST 21 10 - 35 U/L    ALT 17 10 - 35 U/L    Protein Total 7.6 6.4 - 8.3 g/dL    Albumin 4.7 3.5 - 5.2 g/dL    Bilirubin Total 0.2 <=1.2 mg/dL    GFR Estimate >90 >60 mL/min/1.73m2   TSH     Status: Normal   Result Value Ref Range    TSH 1.79 0.30 - 4.20 uIU/mL   CBC with platelets and differential     Status: None   Result Value Ref Range    WBC Count 6.8 4.0 - 11.0 10e3/uL    RBC Count 4.41 3.80 - 5.20 10e6/uL    Hemoglobin 13.4 11.7 - 15.7 g/dL    Hematocrit 38.7 35.0 - 47.0 %    MCV 88 78 - 100 fL    MCH 30.4 26.5 - 33.0 pg    MCHC 34.6 31.5 - 36.5 g/dL    RDW 12.3 10.0 - 15.0 %    Platelet Count 267 150 - 450 10e3/uL    % Neutrophils 67 %    % Lymphocytes 26 %    % Monocytes 6 %    % Eosinophils 0 %    % Basophils 1 %    % Immature Granulocytes 0 %    NRBCs per 100 WBC 0 <1 /100    Absolute Neutrophils 4.6 1.6 - 8.3 10e3/uL    Absolute Lymphocytes 1.7 0.8 - 5.3 10e3/uL    Absolute Monocytes 0.4 0.0 - 1.3 10e3/uL    Absolute Eosinophils 0.0 0.0 - 0.7 10e3/uL    Absolute Basophils 0.0 0.0 - 0.2 10e3/uL    Absolute Immature Granulocytes 0.0 <=0.4 10e3/uL    Absolute NRBCs 0.0 10e3/uL   CBC with Platelets & Differential     Status: None    Narrative    The following orders were created for panel order CBC with Platelets & Differential.  Procedure                               Abnormality         Status                     ---------                               -----------         ------                     CBC with platelets and d...[[id number]]                      Final result                 Please view results for these tests on the individual orders.         ASSESSMENT/PLAN:    (Z00.00) Routine general medical examination at a health care facility  (primary encounter diagnosis)  Comment: doing real well.   Plan: mammogram today. Follow-up in a year     (E03.9) Acquired hypothyroidism  Comment: great control. Continue current medications and behavioral changes.   Plan: CBC with Platelets & Differential,         Comprehensive metabolic panel, TSH            (F43.21) Adjustment disorder with depressed mood  Comment: doing awesome- doing real with with all of life events   Plan: TSH        Continue current medications and behavioral changes.     (Z12.31) Encounter for screening mammogram for breast cancer  Comment: as above. Done today   Plan: MA Screen Bilateral w/Ross                    COUNSELING:  Reviewed preventive health counseling, as reflected in patient instructions       Regular exercise       Healthy diet/nutrition       Colorectal Cancer Screening        She reports that she has never smoked. She has never used smokeless tobacco.          Arnav Lawrence MD  [facility]

## 2023-05-17 ENCOUNTER — MYC MEDICAL ADVICE (OUTPATIENT)
Dept: FAMILY MEDICINE | Facility: OTHER | Age: 48
End: 2023-05-17

## 2023-05-17 ENCOUNTER — TELEPHONE (OUTPATIENT)
Dept: MAMMOGRAPHY | Facility: OTHER | Age: 48
End: 2023-05-17

## 2023-06-22 ENCOUNTER — HOSPITAL ENCOUNTER (OUTPATIENT)
Dept: MAMMOGRAPHY | Facility: OTHER | Age: 48
Discharge: HOME | End: 2023-06-22
Attending: FAMILY MEDICINE
Payer: COMMERCIAL

## 2023-06-22 DIAGNOSIS — R92.8 ABNORMAL FINDING ON BREAST IMAGING: ICD-10-CM

## 2023-06-22 PROCEDURE — G0279 TOMOSYNTHESIS, MAMMO: HCPCS | Mod: TC | Performed by: RADIOLOGY

## 2023-06-22 PROCEDURE — 77065 DX MAMMO INCL CAD UNI: CPT | Mod: TC | Performed by: RADIOLOGY

## 2023-07-30 ENCOUNTER — APPOINTMENT (OUTPATIENT)
Dept: GENERAL RADIOLOGY | Facility: HOSPITAL | Age: 48
End: 2023-07-30
Attending: NURSE PRACTITIONER
Payer: COMMERCIAL

## 2023-07-30 ENCOUNTER — HOSPITAL ENCOUNTER (EMERGENCY)
Facility: HOSPITAL | Age: 48
Discharge: HOME | End: 2023-07-30
Attending: PHYSICIAN ASSISTANT | Admitting: PHYSICIAN ASSISTANT
Payer: COMMERCIAL

## 2023-07-30 VITALS
DIASTOLIC BLOOD PRESSURE: 75 MMHG | HEART RATE: 68 BPM | RESPIRATION RATE: 16 BRPM | TEMPERATURE: 98.3 F | OXYGEN SATURATION: 99 % | SYSTOLIC BLOOD PRESSURE: 117 MMHG

## 2023-07-30 DIAGNOSIS — S40.021A ARM CONTUSION, RIGHT, INITIAL ENCOUNTER: ICD-10-CM

## 2023-07-30 PROCEDURE — 73090 X-RAY EXAM OF FOREARM: CPT | Mod: RT

## 2023-07-30 PROCEDURE — 99213 OFFICE O/P EST LOW 20 MIN: CPT | Performed by: NURSE PRACTITIONER

## 2023-07-30 PROCEDURE — G0463 HOSPITAL OUTPT CLINIC VISIT: HCPCS

## 2023-07-30 ASSESSMENT — ACTIVITIES OF DAILY LIVING (ADL): ADLS_ACUITY_SCORE: 35

## 2023-07-30 ASSESSMENT — ENCOUNTER SYMPTOMS
NAUSEA: 1
NUMBNESS: 0
FEVER: 0
CHILLS: 0
VOMITING: 0
ACTIVITY CHANGE: 1
COLOR CHANGE: 1

## 2023-07-30 NOTE — ED TRIAGE NOTES
Patient presents to urgent care for right forearm injury. Patient reports she was playing floor hockey at a [employer] event and she got hit with a hockey puck this morning.  Pain 8/10 with movement

## 2023-07-30 NOTE — ED PROVIDER NOTES
History     Chief Complaint   Patient presents with    Arm Pain     HPI  Deanne Yee is a 48 year old female who presents with right forearm pain that occurred today while she was playing gym hockey.  Accompanied with nausea, with tingling,  mild swelling, and bruising over posterior forearm.  Her  hit her arm with a hockey puck.  No OTC medications have been taken.  Ice was applied.  Denies previous injuries.  Non-smoker.  Denies fever, chills, and vomiting.    Musculoskeletal problem/pain    Duration: today  Description  Location: hit with hockey puck while playing gym floor [place]  Intensity:  8/10 with movement 4/10 with movement  Accompanying signs and symptoms: tingling, swelling, and discoloration of posterior mid   forearm  History  Previous similar problem: no   Previous evaluation:  none  Precipitating or alleviating factors:  Trauma or overuse: YES- hit with hockey puck  Aggravating factors include: movement  Therapies tried and outcome: ice     Allergies:  No Known Allergies    Problem List:    Patient Active Problem List    Diagnosis Date Noted    Chronic tension-type headache, intractable 11/17/2020     Priority: Medium    Multiple joint pain 11/17/2020     Priority: Medium    KHADAR positive 11/17/2020     Priority: Medium    Payal-menopause 08/31/2020     Priority: Medium    Herpes zoster without complication 08/31/2020     Priority: Medium    Elevated d-dimer 04/10/2017     Priority: Medium    Closed fracture of multiple ribs of right side with routine healing, subsequent encounter 04/10/2017     Priority: Medium    Night sweats 04/10/2017     Priority: Medium    Chronic sinusitis, unspecified location 04/10/2017     Priority: Medium    Migraine with aura and without status migrainosus, not intractable 04/11/2016     Priority: Medium    Acquired hypothyroidism 02/26/2016     Priority: Medium    Adjustment disorder with depressed mood 11/19/2015     Priority: Medium    Dysplasia of cervix  01/12/2012     Priority: Medium     Problem list name updated by automated process. Provider to review          Past Medical History:    Past Medical History:   Diagnosis Date    Cervical dysplasia 01/12/2012    MATHEW (generalized anxiety disorder)     GERD (gastroesophageal reflux disease)     Hx of migraine headaches 01/12/2012    Hypothyroidism     Preeclampsia     Reactive airway disease 01/12/2012    S/P LEEP (loop electrosurgical excision procedure) 01/12/2012    Sleep Pattern Disturbance 01/25/2013       Past Surgical History:    Past Surgical History:   Procedure Laterality Date    COLONOSCOPY  04/2017    [place] - normal    DENTAL SURGERY      wisdom teeth removal    EGD  04/2017    [place] - unremarkable    Loop electrosurgical excision procedure  01/01/2006       Family History:    Family History   Problem Relation Age of Onset    Glaucoma Mother     Other - See Comments Father         hyperlipidemia    Hypertension Father     Other - See Comments Maternal Grandfather         myocardial infarction    Cancer Paternal Grandmother         stomach    Cerebrovascular Disease Paternal Grandfather         CVA; x3    Other - See Comments Maternal Grandmother         myocardial infarction; cause of death       Social History:  Marital Status:   [2]  Social History     Tobacco Use    Smoking status: Never    Smokeless tobacco: Never   Vaping Use    Vaping Use: Never used   Substance Use Topics    Alcohol use: Yes     Alcohol/week: 0.0 standard drinks of alcohol     Comment: 1 beer daily    Drug use: No        Medications:    levothyroxine (SYNTHROID/LEVOTHROID) 50 MCG tablet  sertraline (ZOLOFT) 100 MG tablet          Review of Systems   Constitutional:  Positive for activity change. Negative for chills and fever.   Gastrointestinal:  Positive for nausea. Negative for vomiting.   Musculoskeletal:         Right arm pain and bruising   Skin:  Positive for color change (bruise right forearm).   Neurological:   Negative for numbness (tingling).       Physical Exam   BP: 117/75  Pulse: 68  Temp: 98.3  F (36.8  C)  Resp: 16  SpO2: 99 %      Physical Exam  Vitals and nursing note reviewed.   Constitutional:       General: She is in acute distress (Mild).      Appearance: She is normal weight.   Cardiovascular:      Rate and Rhythm: Normal rate.   Pulmonary:      Effort: Pulmonary effort is normal.   Musculoskeletal:         General: Swelling, tenderness and signs of injury present.      Right elbow: Normal.      Right forearm: Swelling (Mild), tenderness and bony tenderness present.      Right wrist: Tenderness present. Normal pulse (Radial pulse 3+).        Arms:       Comments: Right, mid, posterior forearm   Skin:     Capillary Refill: Capillary refill takes less than 2 seconds.      Findings: Bruising present. No erythema.   Neurological:      Mental Status: She is alert and oriented to person, place, and time.   Psychiatric:         Behavior: Behavior normal.         ED Course                 Procedures           Results for orders placed or performed during the hospital encounter of 07/30/23 (from the past 24 hour(s))   Radius/Ulna XR, PA & LAT, right    Narrative    Exam: XR FOREARM RIGHT 2 VIEWS    Technique: Right forearm, 2 views    Comparison: None.    Exam reason: hit with hockey puck right forearm. pain over distal  radius    Findings:  No acute fracture or dislocation. Joint spaces are well maintained.      Soft tissues appear normal.      Impression    Impression:  No acute fracture or dislocation.    WILLIE HOUSTON MD         SYSTEM ID:  [id number]       Medications - No data to display    Assessments & Plan (with Medical Decision Making)     I have reviewed the nursing notes.    I have reviewed the findings, diagnosis, plan and need for follow up with the patient.  (S4.021A) Arm contusion, right, initial encounter  Comment: 48 year old female who presents with right forearm pain that occurred today while  she was playing gym hockey.  Accompanied with nausea, with tingling,  mild swelling, and bruising over posterior forearm.  Her  hit her arm with a hockey puck.  No OTC medications have been taken.  Ice was applied.  Denies previous injuries.  Non-smoker.  Denies fever, chills, and vomiting.    MDM:3 cm region of ecchymosis lateral, mid, posterior, right forearm.  Tenderness radiates into distal radius and thenar muscle.  Good thumb to finger opposition. Radial pulse 3+    Right arm x-ray reviewed and per radiology; no acute fracture or dislocation.    Ace wrap applied to right arm    Plan: Education provided for upper extremity bruise.  Keep affected extremity elevated as much as possible for next 24 - 48 hours. Ice to affected area 20 minutes every hour as needed for comfort. After 48 hours you can apply heat. Ibuprofen 600 to 800 mg (3 - 4 tabs of over the counter med) every six to eight hours as needed;not to exceed maximum amount of 3200 mg in 24 hours. Take with food. Tylenol 650 to 1000 mg every four to six hours as needed (not to exceed more than 4000 mg in a 24 hour period). May use interchangeably. Suggest medicating around the clock for the next 24-48 hours. Use ace wrap until you you move your right arm without having discomfort.  Slowly start to wiggle your angers and move right forearm/hand as often as possible but not beyond the point of pain. Follow up with primary provider  as needed  These discharge instructions and medications were reviewed with her and understanding verbalized.    This document was prepared using a combination of typing and voice generated software.  While every attempt was made for accuracy, spelling and grammatical errors may exist.    Discharge Medication List as of 7/30/2023  3:04 PM          Final diagnoses:   Arm contusion, right, initial encounter       7/30/2023   [facility]         Alejandrina Arreguin, CNP  07/30/23 [id number]

## 2023-07-30 NOTE — DISCHARGE INSTRUCTIONS
Keep affected extremity elevated as much as possible for next 24 - 48 hours. Ice to affected area 20 minutes every hour as needed for comfort. After 48 hours you can apply heat. Ibuprofen 600 to 800 mg (3 - 4 tabs of over the counter med) every six to eight hours as needed;not to exceed maximum amount of 3200 mg in 24 hours. Take with food. Tylenol 650 to 1000 mg every four to six hours as needed (not to exceed more than 4000 mg in a 24 hour period). May use interchangeably. Suggest medicating around the clock for the next 24-48 hours. Use ace wrap until you you move your right arm without having discomfort.  Slowly start to wiggle your angers and move right forearm/hand as often as possible but not beyond the point of pain. Follow up with primary provider  as needed

## 2023-08-04 NOTE — TELEPHONE ENCOUNTER
Patient notified via phone, will treat self at home, does not feel safe to drive self and child to an appointment.   Doxycycline Pregnancy And Lactation Text: This medication is Pregnancy Category D and not consider safe during pregnancy. It is also excreted in breast milk but is considered safe for shorter treatment courses.

## 2023-08-21 DIAGNOSIS — E03.9 ACQUIRED HYPOTHYROIDISM: ICD-10-CM

## 2023-08-22 RX ORDER — LEVOTHYROXINE SODIUM 50 UG/1
50 TABLET ORAL DAILY
Qty: 90 TABLET | Refills: 2 | Status: SHIPPED | OUTPATIENT
Start: 2023-08-22 | End: 2024-04-08

## 2023-12-12 ENCOUNTER — HOSPITAL ENCOUNTER (EMERGENCY)
Facility: HOSPITAL | Age: 48
Discharge: HOME | End: 2023-12-12
Attending: STUDENT IN AN ORGANIZED HEALTH CARE EDUCATION/TRAINING PROGRAM | Admitting: STUDENT IN AN ORGANIZED HEALTH CARE EDUCATION/TRAINING PROGRAM
Payer: COMMERCIAL

## 2023-12-12 ENCOUNTER — NURSE TRIAGE (OUTPATIENT)
Dept: FAMILY MEDICINE | Facility: OTHER | Age: 48
End: 2023-12-12

## 2023-12-12 ENCOUNTER — APPOINTMENT (OUTPATIENT)
Dept: GENERAL RADIOLOGY | Facility: HOSPITAL | Age: 48
End: 2023-12-12
Attending: STUDENT IN AN ORGANIZED HEALTH CARE EDUCATION/TRAINING PROGRAM
Payer: COMMERCIAL

## 2023-12-12 VITALS
RESPIRATION RATE: 20 BRPM | TEMPERATURE: 101 F | OXYGEN SATURATION: 95 % | DIASTOLIC BLOOD PRESSURE: 63 MMHG | SYSTOLIC BLOOD PRESSURE: 108 MMHG | HEART RATE: 98 BPM

## 2023-12-12 DIAGNOSIS — U07.1 COVID-19: ICD-10-CM

## 2023-12-12 LAB
ANION GAP SERPL CALCULATED.3IONS-SCNC: 14 MMOL/L (ref 7–15)
BUN SERPL-MCNC: 7.2 MG/DL (ref 6–20)
CALCIUM SERPL-MCNC: 9 MG/DL (ref 8.6–10)
CHLORIDE SERPL-SCNC: 104 MMOL/L (ref 98–107)
CREAT SERPL-MCNC: 0.77 MG/DL (ref 0.51–0.95)
DEPRECATED HCO3 PLAS-SCNC: 21 MMOL/L (ref 22–29)
EGFRCR SERPLBLD CKD-EPI 2021: >90 ML/MIN/1.73M2
ERYTHROCYTE [DISTWIDTH] IN BLOOD BY AUTOMATED COUNT: 11.9 % (ref 10–15)
FLUAV RNA SPEC QL NAA+PROBE: NEGATIVE
FLUBV RNA RESP QL NAA+PROBE: NEGATIVE
GLUCOSE SERPL-MCNC: 122 MG/DL (ref 70–99)
HCT VFR BLD AUTO: 36.1 % (ref 35–47)
HGB BLD-MCNC: 12.7 G/DL (ref 11.7–15.7)
HOLD SPECIMEN: NORMAL
MCH RBC QN AUTO: 31 PG (ref 26.5–33)
MCHC RBC AUTO-ENTMCNC: 35.2 G/DL (ref 31.5–36.5)
MCV RBC AUTO: 88 FL (ref 78–100)
PLATELET # BLD AUTO: 208 10E3/UL (ref 150–450)
POTASSIUM SERPL-SCNC: 3.5 MMOL/L (ref 3.4–5.3)
RBC # BLD AUTO: 4.1 10E6/UL (ref 3.8–5.2)
RSV RNA SPEC NAA+PROBE: NEGATIVE
SARS-COV-2 RNA RESP QL NAA+PROBE: POSITIVE
SODIUM SERPL-SCNC: 139 MMOL/L (ref 135–145)
TROPONIN T SERPL HS-MCNC: 9 NG/L
WBC # BLD AUTO: 5.1 10E3/UL (ref 4–11)

## 2023-12-12 PROCEDURE — 84484 ASSAY OF TROPONIN QUANT: CPT | Performed by: STUDENT IN AN ORGANIZED HEALTH CARE EDUCATION/TRAINING PROGRAM

## 2023-12-12 PROCEDURE — 80048 BASIC METABOLIC PNL TOTAL CA: CPT | Performed by: STUDENT IN AN ORGANIZED HEALTH CARE EDUCATION/TRAINING PROGRAM

## 2023-12-12 PROCEDURE — C9803 HOPD COVID-19 SPEC COLLECT: HCPCS

## 2023-12-12 PROCEDURE — 93005 ELECTROCARDIOGRAM TRACING: CPT

## 2023-12-12 PROCEDURE — 71046 X-RAY EXAM CHEST 2 VIEWS: CPT

## 2023-12-12 PROCEDURE — 250N000011 HC RX IP 250 OP 636: Performed by: STUDENT IN AN ORGANIZED HEALTH CARE EDUCATION/TRAINING PROGRAM

## 2023-12-12 PROCEDURE — 99284 EMERGENCY DEPT VISIT MOD MDM: CPT | Performed by: STUDENT IN AN ORGANIZED HEALTH CARE EDUCATION/TRAINING PROGRAM

## 2023-12-12 PROCEDURE — 99285 EMERGENCY DEPT VISIT HI MDM: CPT | Mod: 25

## 2023-12-12 PROCEDURE — 250N000013 HC RX MED GY IP 250 OP 250 PS 637: Performed by: STUDENT IN AN ORGANIZED HEALTH CARE EDUCATION/TRAINING PROGRAM

## 2023-12-12 PROCEDURE — 36415 COLL VENOUS BLD VENIPUNCTURE: CPT | Performed by: STUDENT IN AN ORGANIZED HEALTH CARE EDUCATION/TRAINING PROGRAM

## 2023-12-12 PROCEDURE — 93010 ELECTROCARDIOGRAM REPORT: CPT | Performed by: INTERNAL MEDICINE

## 2023-12-12 PROCEDURE — 87637 SARSCOV2&INF A&B&RSV AMP PRB: CPT | Performed by: STUDENT IN AN ORGANIZED HEALTH CARE EDUCATION/TRAINING PROGRAM

## 2023-12-12 PROCEDURE — 85027 COMPLETE CBC AUTOMATED: CPT | Performed by: STUDENT IN AN ORGANIZED HEALTH CARE EDUCATION/TRAINING PROGRAM

## 2023-12-12 RX ORDER — ONDANSETRON 4 MG/1
4 TABLET, ORALLY DISINTEGRATING ORAL ONCE
Status: COMPLETED | OUTPATIENT
Start: 2023-12-12 | End: 2023-12-12

## 2023-12-12 RX ORDER — ONDANSETRON 4 MG/1
4 TABLET, ORALLY DISINTEGRATING ORAL EVERY 8 HOURS PRN
Qty: 10 TABLET | Refills: 0 | Status: SHIPPED | OUTPATIENT
Start: 2023-12-12 | End: 2023-12-15

## 2023-12-12 RX ORDER — IBUPROFEN 200 MG
400 TABLET ORAL ONCE
Status: COMPLETED | OUTPATIENT
Start: 2023-12-12 | End: 2023-12-12

## 2023-12-12 RX ADMIN — ONDANSETRON 4 MG: 4 TABLET, ORALLY DISINTEGRATING ORAL at 10:31

## 2023-12-12 RX ADMIN — IBUPROFEN 400 MG: 200 TABLET, FILM COATED ORAL at 10:31

## 2023-12-12 ASSESSMENT — ACTIVITIES OF DAILY LIVING (ADL)
ADLS_ACUITY_SCORE: 33
ADLS_ACUITY_SCORE: 35

## 2023-12-12 NOTE — ED TRIAGE NOTES
Patient woke up with chest pain at 0200. Nauseous.Has had runny nose, slight cough. Pain is mid to left chest, down the left arm, fingers tingling, across both shoulders

## 2023-12-12 NOTE — DISCHARGE INSTRUCTIONS
- Please take Tylenol, Ibuprofen, and the prescribed Zofran for your symptoms  - Please return to the Emergency Room if you do not improve, feel worse, or have any new or concerning symptoms. We would especially want to see you back if you experience worsening chest pain or other new symptoms.  - Please follow up with a primary care physician in 1 week to ensure you have improved

## 2023-12-12 NOTE — ED PROVIDER NOTES
History     Chief Complaint   Patient presents with    Chest Pain     HPI  Deanne Yee is a 48 year old female with PMH hypothyroidism, depression/anxiety, presenting with multiple concerns.  She has had a runny nose for the past several days but developed more coughing yesterday and since then has had chest pain, palpitations, lightheadedness.  She has not had anything to eat in the past day as she has some nausea and decreased appetite.  She has had some diarrhea but no vomiting.  She was unaware that she had a fever.  She took NyQuil at 630 this morning and she did not sleep well and was very tired and has cough/congestion.  No history of PE.  No leg swelling.  No history of CHF.  No radiation of pain to the back. Does have a mild headache as well.    Allergies:  No Known Allergies    Problem List:    Patient Active Problem List    Diagnosis Date Noted    Chronic tension-type headache, intractable 11/17/2020     Priority: Medium    Multiple joint pain 11/17/2020     Priority: Medium    KHADAR positive 11/17/2020     Priority: Medium    Payal-menopause 08/31/2020     Priority: Medium    Herpes zoster without complication 08/31/2020     Priority: Medium    Elevated d-dimer 04/10/2017     Priority: Medium    Closed fracture of multiple ribs of right side with routine healing, subsequent encounter 04/10/2017     Priority: Medium    Night sweats 04/10/2017     Priority: Medium    Chronic sinusitis, unspecified location 04/10/2017     Priority: Medium    Migraine with aura and without status migrainosus, not intractable 04/11/2016     Priority: Medium    Acquired hypothyroidism 02/26/2016     Priority: Medium    Adjustment disorder with depressed mood 11/19/2015     Priority: Medium    Dysplasia of cervix 01/12/2012     Priority: Medium     Problem list name updated by automated process. Provider to review          Past Medical History:    Past Medical History:   Diagnosis Date    Cervical dysplasia 01/12/2012     MATHEW (generalized anxiety disorder)     GERD (gastroesophageal reflux disease)     Hx of migraine headaches 01/12/2012    Hypothyroidism     Preeclampsia     Reactive airway disease 01/12/2012    S/P LEEP (loop electrosurgical excision procedure) 01/12/2012    Sleep Pattern Disturbance 01/25/2013       Past Surgical History:    Past Surgical History:   Procedure Laterality Date    COLONOSCOPY  04/2017    Luli - normal    DENTAL SURGERY      wisdom teeth removal    EGD  04/2017    [facility] - unremarkable    Loop electrosurgical excision procedure  01/01/2006       Family History:    Family History   Problem Relation Age of Onset    Glaucoma Mother     Other - See Comments Father         hyperlipidemia    Hypertension Father     Other - See Comments Maternal Grandfather         myocardial infarction    Cancer Paternal Grandmother         stomach    Cerebrovascular Disease Paternal Grandfather         CVA; x3    Other - See Comments Maternal Grandmother         myocardial infarction; cause of death       Social History:  Marital Status:   [2]  Social History     Tobacco Use    Smoking status: Never    Smokeless tobacco: Never   Vaping Use    Vaping Use: Never used   Substance Use Topics    Alcohol use: Yes     Alcohol/week: 0.0 standard drinks of alcohol     Comment: 1 beer daily    Drug use: No        Medications:    levothyroxine (SYNTHROID/LEVOTHROID) 50 MCG tablet  ondansetron (ZOFRAN ODT) 4 MG ODT tab  sertraline (ZOLOFT) 100 MG tablet          Review of Systems   All other systems reviewed and are negative.      Physical Exam   BP: 122/73  Pulse: 100  Temp: (!) 101.4  F (38.6  C)  Resp: 20  SpO2: 97 %      Physical Exam  Vitals and nursing note reviewed.   Constitutional:       General: She is not in acute distress.     Appearance: She is well-developed. She is not ill-appearing or toxic-appearing.   HENT:      Head: Normocephalic.   Eyes:      Extraocular Movements: Extraocular movements intact.       Pupils: Pupils are equal, round, and reactive to light.   Cardiovascular:      Rate and Rhythm: Normal rate and regular rhythm.      Heart sounds: Normal heart sounds.   Pulmonary:      Effort: Pulmonary effort is normal. No tachypnea or accessory muscle usage.      Breath sounds: Normal breath sounds. No decreased breath sounds.   Abdominal:      Palpations: Abdomen is soft.      Tenderness: There is no abdominal tenderness.   Musculoskeletal:         General: Normal range of motion.      Cervical back: Normal range of motion and neck supple.      Right lower leg: No tenderness. No edema.      Left lower leg: No tenderness. No edema.   Skin:     General: Skin is warm and dry.      Capillary Refill: Capillary refill takes less than 2 seconds.   Neurological:      General: No focal deficit present.      Mental Status: She is alert and oriented to person, place, and time.   Psychiatric:         Mood and Affect: Mood normal.         ED Course              ED Course as of 12/12/23 1127   Tue Dec 12, 2023   1124 Work-up consistent with covid symptoms. Otherwise appears well. Feeling improved. Tolerated Po. Able to ambulate.   1124 Findings were discussed with the patient including non-emergent imaging/lab results. Additional verbal instructions were discussed with the patient as well. Instructed to follow up with a primary care provider within 1 week. Also discussed specific warning signs and instructed to return to the ED if there are any concerns. Patient voiced understanding of instructions, questions were answered and the patient was discharged home in stable condition.       Procedures              EKG Interpretation:      Interpreted by VANIA HERBERT MD  Time reviewed: 0920  Symptoms at time of EKG: Chest pain   Rhythm: normal sinus   Rate: normal  Axis: normal  Ectopy: none  Conduction: normal  ST Segments/ T Waves: No acute ST-T wave changes  Q Waves: none  Comparison to prior: Unchanged    Clinical Impression:  normal EKG         Results for orders placed or performed during the hospital encounter of 12/12/23 (from the past 24 hour(s))   Troponin T, High Sensitivity   Result Value Ref Range    Troponin T, High Sensitivity 9 <=14 ng/L   CBC with platelets   Result Value Ref Range    WBC Count 5.1 4.0 - 11.0 10e3/uL    RBC Count 4.10 3.80 - 5.20 10e6/uL    Hemoglobin 12.7 11.7 - 15.7 g/dL    Hematocrit 36.1 35.0 - 47.0 %    MCV 88 78 - 100 fL    MCH 31.0 26.5 - 33.0 pg    MCHC 35.2 31.5 - 36.5 g/dL    RDW 11.9 10.0 - 15.0 %    Platelet Count 208 150 - 450 10e3/uL   Basic metabolic panel   Result Value Ref Range    Sodium 139 135 - 145 mmol/L    Potassium 3.5 3.4 - 5.3 mmol/L    Chloride 104 98 - 107 mmol/L    Carbon Dioxide (CO2) 21 (L) 22 - 29 mmol/L    Anion Gap 14 7 - 15 mmol/L    Urea Nitrogen 7.2 6.0 - 20.0 mg/dL    Creatinine 0.77 0.51 - 0.95 mg/dL    GFR Estimate >90 >60 mL/min/1.73m2    Calcium 9.0 8.6 - 10.0 mg/dL    Glucose 122 (H) 70 - 99 mg/dL   [facility] Draw    Narrative    The following orders were created for panel order [facility].  Procedure                               Abnormality         Status                     ---------                               -----------         ------                     Extra Blue Top Tube[[id number]]                              Final result               Extra Red Top Tube[[id number]]                               Final result               Extra Green Top (Lithium...[[id number]]                                                 Extra Green Top (Lithium...[[id number]]                      Final result               Extra Purple Top Tube[[id number]]                            Final result                 Please view results for these tests on the individual orders.   Extra Blue Top Tube   Result Value Ref Range    Hold Specimen [facility]    Extra Red Top Tube   Result Value Ref Range    Hold Specimen [facility]    Extra Green Top (Lithium Heparin) Tube   Result Value Ref Range    Hold Specimen  [facility]    Extra Purple Top Tube   Result Value Ref Range    Hold Specimen JI    Symptomatic Influenza A/B, RSV, & SARS-CoV2 PCR (COVID-19) Nasopharyngeal    Specimen: Nasopharyngeal; Swab   Result Value Ref Range    Influenza A PCR Negative Negative    Influenza B PCR Negative Negative    RSV PCR Negative Negative    SARS CoV2 PCR Positive (A) Negative    Narrative    Testing was performed using the Xpert Xpress CoV2/Flu/RSV Assay on the [employer] [facility] [facility] GeneXpert Instrument. This test should be ordered for the detection of SARS-CoV-2, influenza, and RSV viruses in individuals who meet clinical and/or epidemiological criteria. Test performance is unknown in asymptomatic patients. This test is for in vitro diagnostic use under the FDA EUA for laboratories certified under CLIA to perform high or moderate complexity testing. This test has not been FDA cleared or approved. A negative result does not rule out the presence of PCR inhibitors in the specimen or target RNA in concentration below the limit of detection for the assay. If only one viral target is positive but coinfection with multiple targets is suspected, the sample should be re-tested with another FDA cleared, approved, or authorized test, if coinfection would change clinical management. This test was validated by the [facility]. These laboratories are certified under the Clinical Laboratory Improvement Amendments of 1988 (CLIA-88) as qualified to perform high complexity laboratory testing.   EKG 12 lead   Result Value Ref Range    Systolic Blood Pressure  mmHg    Diastolic Blood Pressure  mmHg    Ventricular Rate 87 BPM    Atrial Rate 87 BPM    TN Interval 158 ms    QRS Duration 84 ms     ms    QTc 430 ms    P Axis 66 degrees    R AXIS 76 degrees    T Axis 61 degrees    Interpretation ECG       Sinus rhythm  Nonspecific ST abnormality  Abnormal ECG  No previous ECGs available     Chest XR,  PA & LAT    Narrative    PROCEDURE: XR CHEST 2 VIEWS  12/12/2023 10:09 AM    HISTORY: Cough, fever, chest pain    COMPARISONS: 10/8/2020.    TECHNIQUE: 2 views.    FINDINGS: Heart is not enlarged. Lungs are clear and no pleural  effusion is seen.         Impression    IMPRESSION: No acute infiltrate.    GIA BOWMAN MD         SYSTEM ID:  [id number]       Medications   ondansetron (ZOFRAN ODT) ODT tab 4 mg (4 mg Oral $Given 12/12/23 1031)   ibuprofen (ADVIL/MOTRIN) tablet 400 mg (400 mg Oral $Given 12/12/23 1031)       Assessments & Plan (with Medical Decision Making)     I have reviewed the nursing notes.    48yoF with atypical chest pain, palpitations, SOB, cough, rhinorrhea, nausea, diarrhea progressively over the past 3-4 days. Work-up here has been reassuring. No arrhythmia on monitor. EKG reassuring. Troponin reassuring as symptoms present since last night. Do not suspect PE or dissection. No evidence of CHF on exam. Covid +. Discussed risks/benefits of antiviral treatment and she declined. XR reassuring. Will give zofran, ibuprofen, ensure she can eat and ambulate without syncope. If able to do this, okay to discharge home.    See ED Course.    I have reviewed the findings, diagnosis, plan and need for follow up with the patient.      New Prescriptions    ONDANSETRON (ZOFRAN ODT) 4 MG ODT TAB    Take 1 tablet (4 mg) by mouth every 8 hours as needed for nausea       Final diagnoses:   COVID-19       12/12/2023   [facility]       Honorio Cameron MD  12/12/23 1037       Honorio Cameron MD  12/12/23 1124

## 2023-12-12 NOTE — TELEPHONE ENCOUNTER
"Reason for Disposition   Difficulty breathing   Dizziness, lightheadedness, or weakness    Answer Assessment - Initial Assessment Questions  1. DESCRIPTION: \"Please describe your heart rate or heartbeat that you are having\" (e.g., fast/slow, regular/irregular, skipped or extra beats, \"palpitations\")      Fast heart beat 115  2. ONSET: \"When did it start?\" (Minutes, hours or days)       4 am  3. DURATION: \"How long does it last\" (e.g., seconds, minutes, hours)      continuous  4. PATTERN \"Does it come and go, or has it been constant since it started?\"  \"Does it get worse with exertion?\"   \"Are you feeling it now?\"      continuous  5. TAP: \"Using your hand, can you tap out what you are feeling on a chair or table in front of you, so that I can hear?\" (Note: not all patients can do this)          6. HEART RATE: \"Can you tell me your heart rate?\" \"How many beats in 15 seconds?\"  (Note: not all patients can do this)        103-115  7. RECURRENT SYMPTOM: \"Have you ever had this before?\" If Yes, ask: \"When was the last time?\" and \"What happened that time?\"       no  8. CAUSE: \"What do you think is causing the palpitations?\"      Unknown    9. CARDIAC HISTORY: \"Do you have any history of heart disease?\" (e.g., heart attack, angina, bypass surgery, angioplasty, arrhythmia)       no  10. OTHER SYMPTOMS: \"Do you have any other symptoms?\" (e.g., dizziness, chest pain, sweating, difficulty breathing)        Chest pain at times dizziness at times difficulty breathing at time  11. PREGNANCY: \"Is there any chance you are pregnant?\" \"When was your last menstrual period?\"        no    Protocols used: Heart Rate and Heartbeat [id number]    "

## 2023-12-12 NOTE — ED NOTES
Patient resting in bed, reports no changes in sx. Temp 98.7 oral. Updated on covid +. Declines further needs.

## 2023-12-13 LAB
ATRIAL RATE - MUSE: 87 BPM
DIASTOLIC BLOOD PRESSURE - MUSE: NORMAL MMHG
INTERPRETATION ECG - MUSE: NORMAL
P AXIS - MUSE: 66 DEGREES
PR INTERVAL - MUSE: 158 MS
QRS DURATION - MUSE: 84 MS
QT - MUSE: 358 MS
QTC - MUSE: 430 MS
R AXIS - MUSE: 76 DEGREES
SYSTOLIC BLOOD PRESSURE - MUSE: NORMAL MMHG
T AXIS - MUSE: 61 DEGREES
VENTRICULAR RATE- MUSE: 87 BPM

## 2024-04-08 ENCOUNTER — MYC REFILL (OUTPATIENT)
Dept: FAMILY MEDICINE | Facility: OTHER | Age: 49
End: 2024-04-08

## 2024-04-08 DIAGNOSIS — F43.21 ADJUSTMENT DISORDER WITH DEPRESSED MOOD: ICD-10-CM

## 2024-04-08 DIAGNOSIS — E03.9 ACQUIRED HYPOTHYROIDISM: ICD-10-CM

## 2024-04-08 RX ORDER — SERTRALINE HYDROCHLORIDE 100 MG/1
TABLET, FILM COATED ORAL
Qty: 180 TABLET | Refills: 3 | Status: CANCELLED | OUTPATIENT
Start: 2024-04-08

## 2024-04-08 NOTE — TELEPHONE ENCOUNTER
sertraline (ZOLOFT) 100 MG tablet        Last Written Prescription Date:  5-16-23  Last Fill Quantity: 180,   # refills: 3  Last Office Visit: 5-16-23  Future Office visit:       Routing refill request to provider for review/approval because:  Drug not on the [facility], [facility] or [facility] refill protocol or controlled substance

## 2024-04-10 RX ORDER — LEVOTHYROXINE SODIUM 50 UG/1
50 TABLET ORAL DAILY
Qty: 90 TABLET | Refills: 2 | Status: SHIPPED | OUTPATIENT
Start: 2024-04-10

## 2024-04-10 RX ORDER — SERTRALINE HYDROCHLORIDE 100 MG/1
TABLET, FILM COATED ORAL
Qty: 180 TABLET | Refills: 3 | Status: SHIPPED | OUTPATIENT
Start: 2024-04-10

## 2024-07-07 ENCOUNTER — HEALTH MAINTENANCE LETTER (OUTPATIENT)
Age: 49
End: 2024-07-07

## 2024-12-20 SDOH — HEALTH STABILITY: PHYSICAL HEALTH: ON AVERAGE, HOW MANY DAYS PER WEEK DO YOU ENGAGE IN MODERATE TO STRENUOUS EXERCISE (LIKE A BRISK WALK)?: 3 DAYS

## 2024-12-20 SDOH — HEALTH STABILITY: PHYSICAL HEALTH: ON AVERAGE, HOW MANY MINUTES DO YOU ENGAGE IN EXERCISE AT THIS LEVEL?: 40 MIN

## 2024-12-20 ASSESSMENT — SOCIAL DETERMINANTS OF HEALTH (SDOH): HOW OFTEN DO YOU GET TOGETHER WITH FRIENDS OR RELATIVES?: THREE TIMES A WEEK

## 2024-12-24 ENCOUNTER — LAB (OUTPATIENT)
Dept: LAB | Facility: OTHER | Age: 49
End: 2024-12-24
Attending: FAMILY MEDICINE
Payer: COMMERCIAL

## 2024-12-24 ENCOUNTER — OFFICE VISIT (OUTPATIENT)
Dept: FAMILY MEDICINE | Facility: OTHER | Age: 49
End: 2024-12-24
Attending: FAMILY MEDICINE
Payer: COMMERCIAL

## 2024-12-24 VITALS
BODY MASS INDEX: 23.25 KG/M2 | HEART RATE: 61 BPM | WEIGHT: 144.7 LBS | HEIGHT: 66 IN | OXYGEN SATURATION: 96 % | TEMPERATURE: 98 F | SYSTOLIC BLOOD PRESSURE: 108 MMHG | DIASTOLIC BLOOD PRESSURE: 70 MMHG

## 2024-12-24 DIAGNOSIS — E03.9 ACQUIRED HYPOTHYROIDISM: ICD-10-CM

## 2024-12-24 DIAGNOSIS — F43.21 ADJUSTMENT DISORDER WITH DEPRESSED MOOD: ICD-10-CM

## 2024-12-24 DIAGNOSIS — Z12.31 ENCOUNTER FOR SCREENING MAMMOGRAM FOR BREAST CANCER: ICD-10-CM

## 2024-12-24 DIAGNOSIS — N63.42 SUBAREOLAR MASS OF LEFT BREAST: ICD-10-CM

## 2024-12-24 DIAGNOSIS — Z00.00 WELL WOMAN EXAM (NO GYNECOLOGICAL EXAM): Primary | ICD-10-CM

## 2024-12-24 DIAGNOSIS — Z00.00 WELL WOMAN EXAM (NO GYNECOLOGICAL EXAM): ICD-10-CM

## 2024-12-24 LAB
ALBUMIN SERPL BCG-MCNC: 4.5 G/DL (ref 3.5–5.2)
ALP SERPL-CCNC: 56 U/L (ref 40–150)
ALT SERPL W P-5'-P-CCNC: 16 U/L (ref 0–50)
ANION GAP SERPL CALCULATED.3IONS-SCNC: 8 MMOL/L (ref 7–15)
AST SERPL W P-5'-P-CCNC: 24 U/L (ref 0–45)
BASOPHILS # BLD AUTO: 0 10E3/UL (ref 0–0.2)
BASOPHILS NFR BLD AUTO: 1 %
BILIRUB SERPL-MCNC: 0.3 MG/DL
BUN SERPL-MCNC: 10.7 MG/DL (ref 6–20)
CALCIUM SERPL-MCNC: 8.7 MG/DL (ref 8.8–10.4)
CHLORIDE SERPL-SCNC: 105 MMOL/L (ref 98–107)
CREAT SERPL-MCNC: 0.92 MG/DL (ref 0.51–0.95)
EGFRCR SERPLBLD CKD-EPI 2021: 76 ML/MIN/1.73M2
EOSINOPHIL # BLD AUTO: 0.1 10E3/UL (ref 0–0.7)
EOSINOPHIL NFR BLD AUTO: 1 %
ERYTHROCYTE [DISTWIDTH] IN BLOOD BY AUTOMATED COUNT: 11.8 % (ref 10–15)
GLUCOSE SERPL-MCNC: 95 MG/DL (ref 70–99)
HCO3 SERPL-SCNC: 27 MMOL/L (ref 22–29)
HCT VFR BLD AUTO: 39.4 % (ref 35–47)
HGB BLD-MCNC: 13.3 G/DL (ref 11.7–15.7)
IMM GRANULOCYTES # BLD: 0 10E3/UL
IMM GRANULOCYTES NFR BLD: 0 %
LYMPHOCYTES # BLD AUTO: 1.8 10E3/UL (ref 0.8–5.3)
LYMPHOCYTES NFR BLD AUTO: 35 %
MCH RBC QN AUTO: 29.7 PG (ref 26.5–33)
MCHC RBC AUTO-ENTMCNC: 33.8 G/DL (ref 31.5–36.5)
MCV RBC AUTO: 88 FL (ref 78–100)
MONOCYTES # BLD AUTO: 0.3 10E3/UL (ref 0–1.3)
MONOCYTES NFR BLD AUTO: 7 %
NEUTROPHILS # BLD AUTO: 2.8 10E3/UL (ref 1.6–8.3)
NEUTROPHILS NFR BLD AUTO: 56 %
NRBC # BLD AUTO: 0 10E3/UL
NRBC BLD AUTO-RTO: 0 /100
PLATELET # BLD AUTO: 270 10E3/UL (ref 150–450)
POTASSIUM SERPL-SCNC: 3.9 MMOL/L (ref 3.4–5.3)
PROT SERPL-MCNC: 7.4 G/DL (ref 6.4–8.3)
RBC # BLD AUTO: 4.48 10E6/UL (ref 3.8–5.2)
SODIUM SERPL-SCNC: 140 MMOL/L (ref 135–145)
TSH SERPL DL<=0.005 MIU/L-ACNC: 2.45 UIU/ML (ref 0.3–4.2)
WBC # BLD AUTO: 5 10E3/UL (ref 4–11)

## 2024-12-24 PROCEDURE — 80050 GENERAL HEALTH PANEL: CPT

## 2024-12-24 PROCEDURE — 36415 COLL VENOUS BLD VENIPUNCTURE: CPT

## 2024-12-24 RX ORDER — LEVOTHYROXINE SODIUM 50 UG/1
50 TABLET ORAL DAILY
Qty: 90 TABLET | Refills: 3 | Status: SHIPPED | OUTPATIENT
Start: 2024-12-24

## 2024-12-24 RX ORDER — SERTRALINE HYDROCHLORIDE 100 MG/1
TABLET, FILM COATED ORAL
Qty: 180 TABLET | Refills: 3 | Status: SHIPPED | OUTPATIENT
Start: 2024-12-24

## 2024-12-24 ASSESSMENT — PAIN SCALES - GENERAL: PAINLEVEL_OUTOF10: NO PAIN (0)

## 2024-12-24 NOTE — PROGRESS NOTES
Preventive Care Visit  [facility]  Arnav Lawrence MD, Family Medicine  Dec 24, 2024      Assessment & Plan     Well woman exam (no gynecological exam)  Doing great no concerns though some signs of perimenopause.  - Comprehensive metabolic panel; Future  - CBC with Platelets & Differential; Future  - TSH; Future    Acquired hypothyroidism  Stable - tsh ok.  Continue current medications and behavioral changes.   - Comprehensive metabolic panel; Future  - CBC with Platelets & Differential; Future  - TSH; Future  - levothyroxine (SYNTHROID/LEVOTHROID) 50 MCG tablet; Take 1 tablet (50 mcg) by mouth daily.    Adjustment disorder with depressed mood  Doing real well even with holidays. Continue current medications and behavioral changes.   - Comprehensive metabolic panel; Future  - CBC with Platelets & Differential; Future  - TSH; Future  - sertraline (ZOLOFT) 100 MG tablet; TAKE 2 TABLETS DAILY    Encounter for screening mammogram for breast cancer  See below.   - Comprehensive metabolic panel; Future  - CBC with Platelets & Differential; Future  - TSH; Future    Subareolar mass of left breast  On exam - dense breast - firmer nodular density on left breast 2-3cm subaeolar.  Discussed. Will order diagnostic sudhakar and US to be safe. Does have again dense and fibrocystic changes in both breasts. Discussed with pt - this is precautionary   - MA Diagnostic Bilateral w/Ross; Future  - US Breast Left Limited 1-3 Quadrants; Future            Counseling  Appropriate preventive services were addressed with this patient via screening, questionnaire, or discussion as appropriate for fall prevention, nutrition, physical activity, Tobacco-use cessation, social engagement, weight loss and cognition.  Checklist reviewing preventive services available has been given to the patient.  Reviewed patient's diet, addressing concerns and/or questions.   She is at risk for lack of exercise and has been provided with information to  increase physical activity for the benefit of her well-being.           No follow-ups on file.    Subjective   Deanne is a 49 year old, presenting for the following:  Physical        12/24/2024     8:44 AM   Additional Questions   Roomed by Diana CABALLERO   Accompanied by self          HPI  Needs new prescription for zoloft-- working well      Depression   How are you doing with your depression since your last visit? No change  Are you having other symptoms that might be associated with depression? No  Have you had a significant life event?  No   Are you feeling anxious or having panic attacks?   No  Do you have any concerns with your use of alcohol or other drugs? No    Social History     Tobacco Use    Smoking status: Never    Smokeless tobacco: Never   Vaping Use    Vaping status: Never Used   Substance Use Topics    Alcohol use: Yes     Comment: 1 beer daily    Drug use: No         10/8/2020    11:00 AM 11/17/2020     9:00 AM 11/22/2021     4:00 PM   PHQ   PHQ-9 Total Score 7 6 1   Q9: Thoughts of better off dead/self-harm past 2 weeks Not at all Not at all Not at all         10/8/2020    11:00 AM 11/17/2020     9:00 AM 11/22/2021     4:00 PM   MATHEW-7 SCORE   Total Score 3 3 1         Suicide Assessment Five-step Evaluation and Treatment (SAFE-T)    Hypothyroidism Follow-up    Since last visit, patient describes the following symptoms: dry skin and fatigue    Health Care Directive  Patient has a Health Care Directive on file  Advance care planning document is on file and is current.      12/20/2024   General Health   How would you rate your overall physical health? Good   Feel stress (tense, anxious, or unable to sleep) Not at all         12/20/2024   Nutrition   Three or more servings of calcium each day? Yes   Diet: Regular (no restrictions)   How many servings of fruit and vegetables per day? (!) 2-3   How many sweetened beverages each day? 0-1         12/20/2024   Exercise   Days per week of moderate/strenous  exercise 3 days   Average minutes spent exercising at this level 40 min         12/20/2024   Social Factors   Frequency of gathering with friends or relatives Three times a week   Worry food won't last until get money to buy more No   Food not last or not have enough money for food? No   Do you have housing? (Housing is defined as stable permanent housing and does not include staying ouside in a car, in a tent, in an abandoned building, in an overnight shelter, or couch-surfing.) Yes   Are you worried about losing your housing? No   Lack of transportation? No   Unable to get utilities (heat,electricity)? No         12/20/2024   Dental   Dentist two times every year? Yes         12/20/2024   TB Screening   Were you born outside of the [place]? No         Today's PHQ-2 Score:       12/24/2024     8:27 AM   PHQ-2 ( 1999 [employer])   Q1: Little interest or pleasure in doing things 0   Q2: Feeling down, depressed or hopeless 1   PHQ-2 Score 1    Q1: Little interest or pleasure in doing things Not at all   Q2: Feeling down, depressed or hopeless Several days   PHQ-2 Score 1       Patient-reported           12/20/2024   Substance Use   Alcohol more than 3/day or more than 7/wk No   Do you use any other substances recreationally? No     Social History     Tobacco Use    Smoking status: Never    Smokeless tobacco: Never   Vaping Use    Vaping status: Never Used   Substance Use Topics    Alcohol use: Yes     Comment: 1 beer daily    Drug use: No           6/22/2023   LAST FHS-7 RESULTS   1st degree relative breast or ovarian cancer No   Any relative bilateral breast cancer No   Any male have breast cancer No   Any ONE woman have BOTH breast AND ovarian cancer No   Any woman with breast cancer before 50yrs No   2 or more relatives with breast AND/OR ovarian cancer No   2 or more relatives with breast AND/OR bowel cancer No        Mammogram Screening - Mammogram every 1-2 years updated in Health Maintenance based on mutual decision  making        12/20/2024   STI Screening   New sexual partner(s) since last STI/HIV test? No     History of abnormal Pap smear: No - age 30- 64 PAP with HPV every 5 years recommended        Latest Ref Rng & Units 10/8/2020    12:26 PM 10/21/2015    12:00 AM 1/29/2014    12:00 AM   PAP / HPV   PAP (Historical)  NIL  NIL  NIL    HPV 16 DNA NEG^Negative Negative      HPV 18 DNA NEG^Negative Negative      Other HR HPV NEG^Negative Negative        ASCVD Risk   The 10-year ASCVD risk score (Emily FLOREZ, et al., 2019) is: 0.5%    Values used to calculate the score:      Age: 49 years      Sex: Female      Is Non- : No      Diabetic: No      Tobacco smoker: No      Systolic Blood Pressure: 108 mmHg      Is BP treated: No      HDL Cholesterol: 77 mg/dL      Total Cholesterol: 196 mg/dL        12/20/2024   Contraception/Family Planning   Questions about contraception or family planning No        Reviewed and updated as needed this visit by Provider                    Past Medical History:   Diagnosis Date    Cervical dysplasia 01/12/2012    Last pap 8/2/17 Normal in [place]. No HPV done.    Depressive disorder 2015    MATHEW (generalized anxiety disorder)     started zoloft while pregnant 2016    GERD (gastroesophageal reflux disease)     while pregnant    Hx of migraine headaches 01/12/2012    Hypothyroidism     Preeclampsia     post partum    Reactive airway disease 01/12/2012    S/P LEEP (loop electrosurgical excision procedure) 01/12/2012    Sleep Pattern Disturbance 01/25/2013    Uncomplicated asthma 9-1989     Past Surgical History:   Procedure Laterality Date    COLONOSCOPY  04/2017    Luli - normal    DENTAL SURGERY      wisdom teeth removal    EGD  04/2017    L[facility] - unremarkable    Loop electrosurgical excision procedure  01/01/2006         Review of Systems  Constitutional, neuro, ENT, endocrine, pulmonary, cardiac, gastrointestinal, genitourinary, musculoskeletal, integument and  "psychiatric systems are negative, except as otherwise noted.     Objective    Exam  /70 (BP Location: Right arm, Patient Position: Sitting, Cuff Size: Adult Regular)   Pulse 61   Temp 98  F (36.7  C) (Tympanic)   Ht 1.676 m (5' 5.98\")   Wt 65.6 kg (144 lb 11.2 oz)   LMP 12/17/2024 (Approximate)   SpO2 96%   BMI 23.37 kg/m     Estimated body mass index is 23.37 kg/m  as calculated from the following:    Height as of this encounter: 1.676 m (5' 5.98\").    Weight as of this encounter: 65.6 kg (144 lb 11.2 oz).    Physical Exam  GENERAL: alert and no distress  EYES: Eyes grossly normal to inspection, PERRL and conjunctivae and sclerae normal  HENT: ear canals and TM's normal, nose and mouth without ulcers or lesions  NECK: no adenopathy, no asymmetry, masses, or scars  RESP: lungs clear to auscultation - no rales, rhonchi or wheezes  BREAST: normal without masses, tenderness or nipple discharge and no palpable axillary masses or adenopathy  CV: regular rate and rhythm, normal S1 S2, no S3 or S4, no murmur, click or rub, no peripheral edema  ABDOMEN: soft, nontender, no hepatosplenomegaly, no masses and bowel sounds normal   (female) w/bimanual: normal female external genitalia, normal urethral meatus, normal vaginal mucosa, and normal cervix/adnexa/uterus without masses or discharge  MS: no gross musculoskeletal defects noted, no edema  SKIN: no suspicious lesions or rashes  NEURO: Normal strength and tone, mentation intact and speech normal  PSYCH: mentation appears normal, affect normal/bright  LYMPH: no cervical, supraclavicular, axillary, or inguinal adenopathy    Results for orders placed or performed in visit on 12/24/24   Comprehensive metabolic panel     Status: Abnormal   Result Value Ref Range    Sodium 140 135 - 145 mmol/L    Potassium 3.9 3.4 - 5.3 mmol/L    Carbon Dioxide (CO2) 27 22 - 29 mmol/L    Anion Gap 8 7 - 15 mmol/L    Urea Nitrogen 10.7 6.0 - 20.0 mg/dL    Creatinine 0.92 0.51 - 0.95 " mg/dL    GFR Estimate 76 >60 mL/min/1.73m2    Calcium 8.7 (L) 8.8 - 10.4 mg/dL    Chloride 105 98 - 107 mmol/L    Glucose 95 70 - 99 mg/dL    Alkaline Phosphatase 56 40 - 150 U/L    AST 24 0 - 45 U/L    ALT 16 0 - 50 U/L    Protein Total 7.4 6.4 - 8.3 g/dL    Albumin 4.5 3.5 - 5.2 g/dL    Bilirubin Total 0.3 <=1.2 mg/dL   TSH     Status: Normal   Result Value Ref Range    TSH 2.45 0.30 - 4.20 uIU/mL   CBC with platelets and differential     Status: None   Result Value Ref Range    WBC Count 5.0 4.0 - 11.0 10e3/uL    RBC Count 4.48 3.80 - 5.20 10e6/uL    Hemoglobin 13.3 11.7 - 15.7 g/dL    Hematocrit 39.4 35.0 - 47.0 %    MCV 88 78 - 100 fL    MCH 29.7 26.5 - 33.0 pg    MCHC 33.8 31.5 - 36.5 g/dL    RDW 11.8 10.0 - 15.0 %    Platelet Count 270 150 - 450 10e3/uL    % Neutrophils 56 %    % Lymphocytes 35 %    % Monocytes 7 %    % Eosinophils 1 %    % Basophils 1 %    % Immature Granulocytes 0 %    NRBCs per 100 WBC 0 <1 /100    Absolute Neutrophils 2.8 1.6 - 8.3 10e3/uL    Absolute Lymphocytes 1.8 0.8 - 5.3 10e3/uL    Absolute Monocytes 0.3 0.0 - 1.3 10e3/uL    Absolute Eosinophils 0.1 0.0 - 0.7 10e3/uL    Absolute Basophils 0.0 0.0 - 0.2 10e3/uL    Absolute Immature Granulocytes 0.0 <=0.4 10e3/uL    Absolute NRBCs 0.0 10e3/uL   CBC with Platelets & Differential     Status: None    Narrative    The following orders were created for panel order CBC with Platelets & Differential.  Procedure                               Abnormality         Status                     ---------                               -----------         ------                     CBC with platelets and d...[[id number]]                      Final result                 Please view results for these tests on the individual orders.         Signed Electronically by: Arnav Lawrence MD

## 2025-01-20 ENCOUNTER — HOSPITAL ENCOUNTER (OUTPATIENT)
Dept: MAMMOGRAPHY | Facility: OTHER | Age: 50
Discharge: HOME | End: 2025-01-20
Attending: FAMILY MEDICINE
Payer: COMMERCIAL

## 2025-01-20 ENCOUNTER — HOSPITAL ENCOUNTER (OUTPATIENT)
Dept: ULTRASOUND IMAGING | Facility: HOSPITAL | Age: 50
Discharge: HOME | End: 2025-01-20
Attending: FAMILY MEDICINE
Payer: COMMERCIAL

## 2025-01-20 DIAGNOSIS — N63.42 SUBAREOLAR MASS OF LEFT BREAST: ICD-10-CM

## 2025-01-20 PROCEDURE — G0279 TOMOSYNTHESIS, MAMMO: HCPCS | Mod: TC | Performed by: RADIOLOGY

## 2025-01-20 PROCEDURE — 76642 ULTRASOUND BREAST LIMITED: CPT | Mod: LT

## 2025-01-20 PROCEDURE — 77066 DX MAMMO INCL CAD BI: CPT | Mod: TC | Performed by: RADIOLOGY
